# Patient Record
Sex: FEMALE | Race: WHITE | NOT HISPANIC OR LATINO | Employment: OTHER | URBAN - METROPOLITAN AREA
[De-identification: names, ages, dates, MRNs, and addresses within clinical notes are randomized per-mention and may not be internally consistent; named-entity substitution may affect disease eponyms.]

---

## 2019-08-30 ENCOUNTER — APPOINTMENT (OUTPATIENT)
Dept: RADIOLOGY | Facility: CLINIC | Age: 79
End: 2019-08-30
Payer: MEDICARE

## 2019-08-30 ENCOUNTER — OFFICE VISIT (OUTPATIENT)
Dept: OBGYN CLINIC | Facility: CLINIC | Age: 79
End: 2019-08-30
Payer: MEDICARE

## 2019-08-30 VITALS — HEIGHT: 64 IN | BODY MASS INDEX: 32.54 KG/M2 | WEIGHT: 190.6 LBS

## 2019-08-30 DIAGNOSIS — M17.12 PRIMARY OSTEOARTHRITIS OF LEFT KNEE: ICD-10-CM

## 2019-08-30 DIAGNOSIS — G89.29 CHRONIC BILATERAL LOW BACK PAIN, WITH SCIATICA PRESENCE UNSPECIFIED: ICD-10-CM

## 2019-08-30 DIAGNOSIS — M76.32 ILIOTIBIAL BAND SYNDROME, LEFT: ICD-10-CM

## 2019-08-30 DIAGNOSIS — M25.561 PAIN IN BOTH KNEES, UNSPECIFIED CHRONICITY: ICD-10-CM

## 2019-08-30 DIAGNOSIS — M54.5 CHRONIC BILATERAL LOW BACK PAIN, WITH SCIATICA PRESENCE UNSPECIFIED: ICD-10-CM

## 2019-08-30 DIAGNOSIS — M25.562 PAIN IN BOTH KNEES, UNSPECIFIED CHRONICITY: ICD-10-CM

## 2019-08-30 DIAGNOSIS — M17.11 PRIMARY OSTEOARTHRITIS OF RIGHT KNEE: Primary | ICD-10-CM

## 2019-08-30 PROCEDURE — 20610 DRAIN/INJ JOINT/BURSA W/O US: CPT | Performed by: ORTHOPAEDIC SURGERY

## 2019-08-30 PROCEDURE — 99203 OFFICE O/P NEW LOW 30 MIN: CPT | Performed by: ORTHOPAEDIC SURGERY

## 2019-08-30 PROCEDURE — 73562 X-RAY EXAM OF KNEE 3: CPT

## 2019-08-30 RX ORDER — OMEPRAZOLE 40 MG/1
40 CAPSULE, DELAYED RELEASE ORAL EVERY MORNING
COMMUNITY
End: 2021-02-16 | Stop reason: SDUPTHER

## 2019-08-30 RX ORDER — DILTIAZEM HYDROCHLORIDE 180 MG/1
180 CAPSULE, EXTENDED RELEASE ORAL DAILY
Refills: 3 | COMMUNITY
Start: 2019-07-15 | End: 2020-01-08 | Stop reason: SDUPTHER

## 2019-08-30 RX ORDER — ATORVASTATIN CALCIUM 40 MG/1
TABLET, FILM COATED ORAL
COMMUNITY
Start: 2019-08-27 | End: 2020-05-21 | Stop reason: SDUPTHER

## 2019-08-30 RX ORDER — ASPIRIN 81 MG/1
81 TABLET ORAL EVERY MORNING
COMMUNITY

## 2019-08-30 RX ORDER — PEN NEEDLE, DIABETIC 31 GX5/16"
NEEDLE, DISPOSABLE MISCELLANEOUS DAILY
COMMUNITY
Start: 2019-08-27

## 2019-08-30 RX ORDER — VENLAFAXINE HYDROCHLORIDE 75 MG/1
75 CAPSULE, EXTENDED RELEASE ORAL DAILY
Refills: 0 | COMMUNITY
Start: 2019-07-15 | End: 2020-08-26

## 2019-08-30 RX ORDER — CYCLOBENZAPRINE HCL 10 MG
TABLET ORAL
Status: ON HOLD | COMMUNITY
End: 2019-11-15

## 2019-08-30 RX ORDER — MONTELUKAST SODIUM 10 MG/1
10 TABLET ORAL EVERY MORNING
COMMUNITY

## 2019-08-30 RX ORDER — HYALURONATE SODIUM 10 MG/ML
20 SYRINGE (ML) INTRAARTICULAR
Status: COMPLETED | OUTPATIENT
Start: 2019-08-30 | End: 2019-08-30

## 2019-08-30 RX ORDER — GABAPENTIN 300 MG/1
CAPSULE ORAL
Status: ON HOLD | COMMUNITY
End: 2020-03-12 | Stop reason: ALTCHOICE

## 2019-08-30 RX ORDER — MELOXICAM 15 MG/1
TABLET ORAL
COMMUNITY
Start: 2019-08-23 | End: 2019-10-09

## 2019-08-30 RX ADMIN — Medication 20 MG: at 11:30

## 2019-08-30 RX ADMIN — Medication 20 MG: at 11:31

## 2019-08-30 NOTE — PROGRESS NOTES
Assessment/Plan:  1  Primary osteoarthritis of right knee  Ambulatory referral to Physical Therapy    Large joint arthrocentesis: R knee   2  Pain in both knees, unspecified chronicity  XR knee 3 vw left non injury    XR knee 3 vw right non injury   3  Primary osteoarthritis of left knee  Ambulatory referral to Physical Therapy    Large joint arthrocentesis: L knee   4  Iliotibial band syndrome, left  Ambulatory referral to Physical Therapy   5  Chronic bilateral low back pain, with sciatica presence unspecified  Ambulatory referral to Physical Therapy       Scribe Attestation    I,:   Janusz Howe am acting as a scribe while in the presence of the attending physician :        I,:   Terri Lieberman MD personally performed the services described in this documentation    as scribed in my presence :              Keo upon examination and review of the x-rays of her left and right knees does demonstrate bilateral osteoarthritis more severe in the right versus the left  She does also demonstrate symptoms of left iliotibial band syndrome and chronic low back pain  As she is not interested in any surgical intervention and 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 she would be a candidate for viscosupplementation injections bilaterally  She was amenable to this treatment option today and tolerated the injections into her left and right knees well with no complications  I did also encourage her to continue with her weight loss program as I noted this will have positive affects in her overall health as well as decrease the stresses onto her knees , and hopefully alleviate painful symptoms  I would also like her to participate in physical therapy for left and right knees as well as her left iliotibial band and low back pain  I provided her with a prescription for this today  I would like her to follow up with my physician assistant Silvino MAE in 1 weeks time for her 2nd round of Euflexxa injections    I will see her back on as-needed basis  Large joint arthrocentesis: R knee  Date/Time: 8/30/2019 11:30 AM  Consent given by: patient  Supporting Documentation  Indications: pain and joint swelling   Procedure Details  Location: knee - R knee  Needle size: 22 G  Approach: anterolateral  Medications administered: 20 mg Sodium Hyaluronate 20 MG/2ML    Patient tolerance: patient tolerated the procedure well with no immediate complications  Dressing:  Sterile dressing applied    Large joint arthrocentesis: L knee  Date/Time: 8/30/2019 11:31 AM  Consent given by: patient  Site marked: site marked  Timeout: Immediately prior to procedure a time out was called to verify the correct patient, procedure, equipment, support staff and site/side marked as required   Supporting Documentation  Indications: pain   Procedure Details  Location: knee - L knee  Preparation: Patient was prepped and draped in the usual sterile fashion  Needle size: 22 G  Ultrasound guidance: no  Approach: anterolateral  Medications administered: 20 mg Sodium Hyaluronate 20 MG/2ML    Patient tolerance: patient tolerated the procedure well with no immediate complications  Dressing:  Sterile dressing applied          Subjective:   Marcelo Davison is a 67 y o  female who presents to the office today for initial evaluation of her left and right knees  She states that she is experiencing persistent and moderate to severe sharp pain into the anterior aspect of her right knee  This has been ongoing for many years  She also has intermittent mild to moderate discomfort into the left knee  She has been previously seen in [place] and did receive x-rays from a physician down there that did demonstrate osteoarthritis in the right knee  The did suggest total knee arthroplasty however she is not interested in surgical intervention at this time  She has been trying to self treat with her own weight management and has successfully lost 30 pounds    She does stay active however has had to stay off of her feet over the last 4 days due to painful symptoms into her right knee  She does appreciate swelling to the anterior aspect of her knee that has caused restrictions in range of motion particularly into flexion  She denies any bouts of instability however does have painful symptoms that do cause her to be on easy while she is on her feet  Today she denies any distal paresthesias into lower extremities  However will have shoe pain into the lateral aspect of the left leg extending to her knee  Review of Systems   Constitutional: Negative for chills, fever and unexpected weight change  HENT: Negative for hearing loss, nosebleeds and sore throat  Eyes: Positive for pain (Glaucoma)  Negative for redness and visual disturbance  Respiratory: Positive for cough  Negative for shortness of breath and wheezing  Cardiovascular: Positive for palpitations and leg swelling  Negative for chest pain  Gastrointestinal: Negative for abdominal pain, nausea and vomiting  Endocrine: Negative for polydipsia and polyuria  Genitourinary: Negative for dysuria and hematuria  Musculoskeletal: Positive for arthralgias, joint swelling and myalgias  See HPI   Skin: Negative for rash and wound  Neurological: Positive for numbness  Negative for dizziness and headaches  Psychiatric/Behavioral: Negative for decreased concentration and suicidal ideas  The patient is not nervous/anxious  Past Medical History:   Diagnosis Date    Diverticulosis     Glaucoma        Past Surgical History:   Procedure Laterality Date    COLON SURGERY      resection    COLONOSCOPY N/A 7/11/2016    Procedure: COLONOSCOPY;  Surgeon: Patsy Kruger MD;  Location: [place] [address] GI LAB;   Service:     COMBINED REDUCTION MAMMAPLASTY W/ LIPOSUCTION      HERNIA REPAIR      x3       Family History   Problem Relation Age of Onset    Heart disease Mother     Dementia Father     No Known Problems Sister  No Known Problems Brother     No Known Problems Maternal Aunt     No Known Problems Maternal Uncle     No Known Problems Paternal Aunt     No Known Problems Paternal Uncle     No Known Problems Maternal Grandmother     No Known Problems Maternal Grandfather     No Known Problems Paternal Grandmother     No Known Problems Paternal Grandfather        Social History     Occupational History    Not on file   Tobacco Use    Smoking status: Former Smoker     Last attempt to quit: 1976     Years since quittin 1    Smokeless tobacco: Never Used   Substance and Sexual Activity    Alcohol use: No    Drug use: No    Sexual activity: Not on file         Current Outpatient Medications:     aspirin (ASPIRIN LOW DOSE) 81 mg EC tablet, aspirin ec low dose 81 mg tbec, Disp: , Rfl:     atorvastatin (LIPITOR) 40 mg tablet, , Disp: , Rfl:     co-enzyme Q-10 50 MG capsule, Take 50 mg by mouth daily  , Disp: , Rfl:     COMFORT EZ PEN NEEDLES 31G X 6 MM MISC, , Disp: , Rfl:     cyclobenzaprine (FLEXERIL) 10 mg tablet, cyclobenzaprine 10 mg tablet, Disp: , Rfl:     diltiazem (TIAZAC) 180 MG 24 hr capsule, Take 180 mg by mouth daily, Disp: , Rfl: 3    gabapentin (NEURONTIN) 300 mg capsule, gabapentin 300 mg capsule, Disp: , Rfl:     liraglutide (VICTOZA) injection, Inject under the skin daily, Disp: , Rfl:     Melatonin 2 5 MG CAPS, Take 2 5 mg by mouth daily  , Disp: , Rfl:     meloxicam (MOBIC) 15 mg tablet, , Disp: , Rfl:     montelukast (SINGULAIR) 10 mg tablet, montelukast 10 mg tablet, Disp: , Rfl:     omeprazole (PriLOSEC) 40 MG capsule, omeprazole 40 mg cpdr, Disp: , Rfl:     ONE TOUCH ULTRA TEST test strip, USE AS DIRECTED TO test blood sugar TWICE DAILY, Disp: , Rfl: 1    venlafaxine (EFFEXOR-XR) 75 mg 24 hr capsule, Take 75 mg by mouth daily, Disp: , Rfl: 0    No Known Allergies    Objective: There were no vitals filed for this visit      Right Knee Exam     Tenderness   The patient is experiencing tenderness in the medial joint line and patella  Range of Motion   Extension: 0   Flexion: 120     Tests   Geoffrey:  Medial - negative Lateral - negative  Varus: negative Valgus: positive (Slight laxity with good endpoint)  Drawer:  Anterior - negative    Posterior - negative    Other   Erythema: absent  Scars: absent  Sensation: normal  Pulse: present  Effusion: effusion (Plus one) present      Left Knee Exam     Tenderness   The patient is experiencing tenderness in the medial joint line  Range of Motion   Extension: 0   Flexion: 130     Tests   Geoffrey:  Medial - negative Lateral - negative  Varus: negative Valgus: positive (Slight laxity to good endpoint)  Drawer:  Anterior - negative     Posterior - negative    Other   Erythema: absent  Scars: absent  Sensation: normal  Pulse: present  Effusion: no effusion present          Observations   Left Knee   Negative for effusion  Right Knee   Positive for effusion (Plus one)  Physical Exam   Constitutional: She is oriented to person, place, and time  She appears well-developed and well-nourished  HENT:   Head: Normocephalic and atraumatic  Eyes: Conjunctivae are normal  Right eye exhibits no discharge  Left eye exhibits no discharge  Neck: Normal range of motion  Neck supple  Cardiovascular: Normal rate and intact distal pulses  Pulmonary/Chest: Effort normal  No respiratory distress  Musculoskeletal:        Right knee: She exhibits effusion (Plus one)  Left knee: She exhibits no effusion  As noted in HPI   Neurological: She is alert and oriented to person, place, and time  Skin: Skin is warm and dry  Psychiatric: She has a normal mood and affect  Her behavior is normal  Judgment and thought content normal    Vitals reviewed          I have personally reviewed pertinent films in PACS and my interpretation is as follows:  X-rays of the right knee demonstrates tricompartmental osteoarthritis most severe into the medial compartment with subchondral sclerosis, and osteophyte formation medial tibial plateau  X-rays of the left knee demonstrates mild-to-moderate tricompartmental osteoarthritis  No osteophyte formations demonstrated

## 2019-09-06 ENCOUNTER — OFFICE VISIT (OUTPATIENT)
Dept: OBGYN CLINIC | Facility: CLINIC | Age: 79
End: 2019-09-06
Payer: MEDICARE

## 2019-09-06 DIAGNOSIS — M54.16 LUMBAR RADICULOPATHY: Primary | ICD-10-CM

## 2019-09-06 DIAGNOSIS — M25.561 PAIN IN BOTH KNEES, UNSPECIFIED CHRONICITY: Primary | ICD-10-CM

## 2019-09-06 DIAGNOSIS — M17.11 PRIMARY OSTEOARTHRITIS OF RIGHT KNEE: ICD-10-CM

## 2019-09-06 DIAGNOSIS — M17.12 PRIMARY OSTEOARTHRITIS OF LEFT KNEE: ICD-10-CM

## 2019-09-06 DIAGNOSIS — M25.562 PAIN IN BOTH KNEES, UNSPECIFIED CHRONICITY: Primary | ICD-10-CM

## 2019-09-06 PROCEDURE — 20610 DRAIN/INJ JOINT/BURSA W/O US: CPT | Performed by: PHYSICIAN ASSISTANT

## 2019-09-06 RX ORDER — HYALURONATE SODIUM 10 MG/ML
20 SYRINGE (ML) INTRAARTICULAR
Status: COMPLETED | OUTPATIENT
Start: 2019-09-06 | End: 2019-09-06

## 2019-09-06 RX ADMIN — Medication 20 MG: at 13:14

## 2019-09-06 NOTE — PROGRESS NOTES
Assessment/Plan:  1  Pain in both knees, unspecified chronicity     2  Primary osteoarthritis of right knee     3  Primary osteoarthritis of left knee         Follow-up 1 week  Subjective:   Sheba Whitt is a 67 y o  female who presents today for euflexxa bilateral knees #2  Review of Systems      Past Medical History:   Diagnosis Date    Diverticulosis     Glaucoma        Past Surgical History:   Procedure Laterality Date    COLON SURGERY      resection    COLONOSCOPY N/A 2016    Procedure: COLONOSCOPY;  Surgeon: Jerson Masterson MD;  [facility]: [facility]; Service:     COMBINED REDUCTION MAMMAPLASTY W/ LIPOSUCTION      HERNIA REPAIR      x3       Family History   Problem Relation Age of Onset    Heart disease Mother     Dementia Father     No Known Problems Sister     No Known Problems Brother     No Known Problems Maternal Aunt     No Known Problems Maternal Uncle     No Known Problems Paternal Aunt     No Known Problems Paternal Uncle     No Known Problems Maternal Grandmother     No Known Problems Maternal Grandfather     No Known Problems Paternal Grandmother     No Known Problems Paternal Grandfather        Social History     Occupational History    Not on file   Tobacco Use    Smoking status: Former Smoker     Last attempt to quit: 1976     Years since quittin 1    Smokeless tobacco: Never Used   Substance and Sexual Activity    Alcohol use: No    Drug use: No    Sexual activity: Not on file         Current Outpatient Medications:     aspirin (ASPIRIN LOW DOSE) 81 mg EC tablet, aspirin ec low dose 81 mg tbec, Disp: , Rfl:     atorvastatin (LIPITOR) 40 mg tablet, , Disp: , Rfl:     co-enzyme Q-10 50 MG capsule, Take 50 mg by mouth daily  , Disp: , Rfl:     COMFORT EZ PEN NEEDLES 31G X 6 MM MISC, , Disp: , Rfl:     cyclobenzaprine (FLEXERIL) 10 mg tablet, cyclobenzaprine 10 mg tablet, Disp: , Rfl:     diltiazem (TIAZAC) 180 MG 24 hr capsule, Take 180 mg by mouth daily, Disp: , Rfl: 3    gabapentin (NEURONTIN) 300 mg capsule, gabapentin 300 mg capsule, Disp: , Rfl:     liraglutide (VICTOZA) injection, Inject under the skin daily, Disp: , Rfl:     Melatonin 2 5 MG CAPS, Take 2 5 mg by mouth daily  , Disp: , Rfl:     meloxicam (MOBIC) 15 mg tablet, , Disp: , Rfl:     montelukast (SINGULAIR) 10 mg tablet, montelukast 10 mg tablet, Disp: , Rfl:     omeprazole (PriLOSEC) 40 MG capsule, omeprazole 40 mg cpdr, Disp: , Rfl:     ONE TOUCH ULTRA TEST test strip, USE AS DIRECTED TO test blood sugar TWICE DAILY, Disp: , Rfl: 1    venlafaxine (EFFEXOR-XR) 75 mg 24 hr capsule, Take 75 mg by mouth daily, Disp: , Rfl: 0    No Known Allergies    Objective: There were no vitals filed for this visit      Ortho Exam    Physical Exam    Large joint arthrocentesis: L knee  Date/Time: 9/6/2019 1:14 PM  Consent given by: patient  Site marked: site marked  Supporting Documentation  Indications: pain   Procedure Details  Location: knee - L knee  Preparation: Patient was prepped and draped in the usual sterile fashion (Alcohol prep)  Needle size: 22 G  Ultrasound guidance: no  Approach: anterolateral  Medications administered: 20 mg Sodium Hyaluronate 20 MG/2ML    Patient tolerance: patient tolerated the procedure well with no immediate complications  Dressing:  Sterile dressing applied    Large joint arthrocentesis: R knee  Date/Time: 9/6/2019 1:14 PM  Consent given by: patient  Site marked: site marked  Supporting Documentation  Indications: pain   Procedure Details  Location: knee - R knee  Preparation: Patient was prepped and draped in the usual sterile fashion (Alcohol prep)  Needle size: 22 G  Ultrasound guidance: no  Approach: anterolateral  Medications administered: 20 mg Sodium Hyaluronate 20 MG/2ML    Patient tolerance: patient tolerated the procedure well with no immediate complications  Dressing:  Sterile dressing applied

## 2019-09-16 ENCOUNTER — CONSULT (OUTPATIENT)
Dept: PAIN MEDICINE | Facility: CLINIC | Age: 79
End: 2019-09-16
Payer: MEDICARE

## 2019-09-16 VITALS
SYSTOLIC BLOOD PRESSURE: 135 MMHG | WEIGHT: 190 LBS | HEART RATE: 103 BPM | BODY MASS INDEX: 32.44 KG/M2 | DIASTOLIC BLOOD PRESSURE: 89 MMHG | HEIGHT: 64 IN

## 2019-09-16 DIAGNOSIS — G89.4 CHRONIC PAIN SYNDROME: Primary | ICD-10-CM

## 2019-09-16 DIAGNOSIS — M54.42 CHRONIC BILATERAL LOW BACK PAIN WITH BILATERAL SCIATICA: ICD-10-CM

## 2019-09-16 DIAGNOSIS — M48.062 SPINAL STENOSIS OF LUMBAR REGION WITH NEUROGENIC CLAUDICATION: ICD-10-CM

## 2019-09-16 DIAGNOSIS — M51.36 LUMBAR DEGENERATIVE DISC DISEASE: ICD-10-CM

## 2019-09-16 DIAGNOSIS — M54.16 LUMBAR RADICULOPATHY: ICD-10-CM

## 2019-09-16 DIAGNOSIS — M54.41 CHRONIC BILATERAL LOW BACK PAIN WITH BILATERAL SCIATICA: ICD-10-CM

## 2019-09-16 DIAGNOSIS — G89.29 CHRONIC BILATERAL LOW BACK PAIN WITH BILATERAL SCIATICA: ICD-10-CM

## 2019-09-16 PROBLEM — M51.369 LUMBAR DEGENERATIVE DISC DISEASE: Status: ACTIVE | Noted: 2019-09-16

## 2019-09-16 PROBLEM — M54.50 LOW BACK PAIN: Status: ACTIVE | Noted: 2019-09-16

## 2019-09-16 PROBLEM — M51.369 DEGENERATIVE DISC DISEASE, LUMBAR: Status: ACTIVE | Noted: 2019-09-16

## 2019-09-16 PROCEDURE — 99204 OFFICE O/P NEW MOD 45 MIN: CPT | Performed by: ANESTHESIOLOGY

## 2019-09-16 NOTE — PROGRESS NOTES
Assessment:  1  Chronic pain syndrome    2  Chronic bilateral low back pain with bilateral sciatica    3  Spinal stenosis of lumbar region with neurogenic claudication    4  Lumbar radiculopathy    5  Lumbar degenerative disc disease        Plan:  My impressions and treatment recommendations were discussed in detail with the patient, who verbalized understanding and had no further questions  The patient reports a 20 year history of low back pain and bilateral lower extremity radicular symptoms  She states that more recently, she has had left greater than right lower extremity pain  She does state that epidural steroid injections were working on her well while she was in [place]  She would like to continue them at this time  As such, I felt a reasonable to offer the patient a L4-L5 lumbar epidural steroid injection since this could be potentially therapeutic  The procedures, its risks, and benefits were explained in detail to the patient  Risks include but are not limited to bleeding, infection, hematoma formation, abscess formation, weakness, headache, failure the pain to improve, nerve irritation or damage, and potential worsening of the pain  The patient verbalized understanding and wished to proceed with the procedure  I did discuss with the patient that she has diabetes mellitus and she is concerned about the blood sugars that that would be elevated after undergoing a steroid injection  I asked her to speak to her primary care physician about a insulin sliding scale in the event that she does get a increasing his blood sugar as a result of the injections  The patient verbalized understanding  Follow-up is planned in 4 weeks time or sooner as warranted  Discharge instructions were provided  I personally saw and examined the patient and I agree with the above discussed plan of care      History of Present Illness:    Carl Lin is a 67 y o  female who presents to [facility] for initial evaluation of the above stated pain complaints  The patient has a past medical and chronic pain history as outlined in the assessment section  She was referred by Kallie Foster PA-C and Dr Fernanda Delgado  The patient reports a 20 year history of low back pain and bilateral lower extremity radicular symptoms  The patient does report that more recently, she has had left greater than right lower extremity pain  She states that her pain is moderate to severe and 7/10 on the verbal numerical pain rating scale  Her pain is constant in nature and she reports the quality of her pain as cramping, shooting, numbness, sharp, pins and needles, and dull/aching    She reports that her pain is worse in the morning, evening, and night  She reports weakness in her upper and lower extremities  She does not ambulate with any assistive devices  There are no pain worsening or relieving factors  She does report excellent pain relief in the past with epidural steroid injections  She does have a medical marijuana card and states that medical marijuana helps her significantly in terms of her anxiety and muscle relaxation problems  Review of Systems:    Review of Systems   Constitutional: Positive for unexpected weight change  Negative for fever  HENT: Positive for hearing loss  Negative for trouble swallowing  Eyes: Positive for pain  Negative for visual disturbance  Respiratory: Positive for cough  Negative for shortness of breath and wheezing  Cardiovascular: Positive for palpitations  Negative for chest pain  Gastrointestinal: Negative for constipation, diarrhea, nausea and vomiting  Endocrine: Positive for polydipsia and polyuria  Negative for cold intolerance and heat intolerance  Genitourinary: Negative for difficulty urinating and frequency  Musculoskeletal: Positive for arthralgias, joint swelling and myalgias  Negative for gait problem  Skin: Negative for rash  Neurological: Positive for dizziness and numbness  Negative for seizures, syncope, weakness and headaches  Hematological: Does not bruise/bleed easily  Psychiatric/Behavioral: Negative for dysphoric mood  All other systems reviewed and are negative  Patient Active Problem List   Diagnosis    Chronic pain syndrome    Chronic bilateral low back pain with bilateral sciatica    Spinal stenosis of lumbar region with neurogenic claudication    Lumbar radiculopathy    Lumbar degenerative disc disease       Past Medical History:   Diagnosis Date    Diverticulosis     Glaucoma     Lumbar degenerative disc disease 2019       Past Surgical History:   Procedure Laterality Date    COLON SURGERY      resection    COLONOSCOPY N/A 2016    Procedure: COLONOSCOPY;  Surgeon: Jamarcus Fitch MD;  Location: [facility] GI LAB;   Service:     COMBINED REDUCTION MAMMAPLASTY W/ LIPOSUCTION      HERNIA REPAIR      x3       Family History   Problem Relation Age of Onset    Heart disease Mother     Dementia Father     No Known Problems Sister     No Known Problems Brother     No Known Problems Maternal Aunt     No Known Problems Maternal Uncle     No Known Problems Paternal Aunt     No Known Problems Paternal Uncle     No Known Problems Maternal Grandmother     No Known Problems Maternal Grandfather     No Known Problems Paternal Grandmother     No Known Problems Paternal Grandfather        Social History     Occupational History    Not on file   Tobacco Use    Smoking status: Former Smoker     Last attempt to quit: 1976     Years since quittin 2    Smokeless tobacco: Never Used   Substance and Sexual Activity    Alcohol use: No    Drug use: No    Sexual activity: Not on file         Current Outpatient Medications:     aspirin (ASPIRIN LOW DOSE) 81 mg EC tablet, aspirin ec low dose 81 mg tbec, Disp: , Rfl:     atorvastatin (LIPITOR) 40 mg tablet, , Disp: , Rfl:     co-enzyme Q-10 50 MG capsule, Take 50 mg by mouth daily  , Disp: , Rfl:     COMFORT EZ PEN NEEDLES 31G X 6 MM MISC, , Disp: , Rfl:     cyclobenzaprine (FLEXERIL) 10 mg tablet, cyclobenzaprine 10 mg tablet, Disp: , Rfl:     diltiazem (TIAZAC) 180 MG 24 hr capsule, Take 180 mg by mouth daily, Disp: , Rfl: 3    gabapentin (NEURONTIN) 300 mg capsule, gabapentin 300 mg capsule, Disp: , Rfl:     liraglutide (VICTOZA) injection, Inject under the skin daily, Disp: , Rfl:     Melatonin 2 5 MG CAPS, Take 2 5 mg by mouth daily  , Disp: , Rfl:     meloxicam (MOBIC) 15 mg tablet, , Disp: , Rfl:     montelukast (SINGULAIR) 10 mg tablet, montelukast 10 mg tablet, Disp: , Rfl:     omeprazole (PriLOSEC) 40 MG capsule, omeprazole 40 mg cpdr, Disp: , Rfl:     ONE TOUCH ULTRA TEST test strip, USE AS DIRECTED TO test blood sugar TWICE DAILY, Disp: , Rfl: 1    venlafaxine (EFFEXOR-XR) 75 mg 24 hr capsule, Take 75 mg by mouth daily, Disp: , Rfl: 0    No Known Allergies    Physical Exam:    /89   Pulse 103   Ht 5' 4" (1 626 m)   Wt 86 2 kg (190 lb)   BMI 32 61 kg/m²     Constitutional: obese  Eyes: anicteric  HEENT: grossly intact  Neck: supple, symmetric, trachea midline and no masses   Pulmonary:even and unlabored  Cardiovascular:No edema or pitting edema present  Skin:Normal without rashes or lesions and well hydrated  Psychiatric:Mood and affect appropriate  Neurologic:Cranial Nerves II-XII grossly intact  Musculoskeletal:antalgic     Lumbar Spine Exam    Appearance:  Normal lordosis  Palpation/Tenderness:  no tenderness or spasm  Sensory:  no sensory deficits noted  Range of Motion:  Flexion:   Moderately limited  with pain  Extension:  Moderately limited  with pain  Lateral Flexion - Left:  Moderately limited  with pain  Lateral Flexion - Right:  Moderately limited  with pain  Rotation - Left:  Moderately limited  with pain  Rotation - Right:  Moderately limited  with pain   Lumbar facet loading is negative bilaterally  Motor Strength:  Left hip flexion:  5/5  Left hip extension:  5/5  Right hip flexion:  5/5  Right hip extension:  5/5  Left knee flexion:  5/5  Left knee extension:  5/5  Right knee flexion:  5/5  Right knee extension:  5/5  Left foot dorsiflexion:  5/5  Left foot plantar flexion:  5/5  Right foot dorsiflexion:  5/5  Right foot plantar flexion:  5/5  Reflexes:  Left Patellar:  2+   Right Patellar:  2+   Left Achilles:  2+   Right Achilles:  2+   Special Tests:  Left Straight Leg Test:  negative  Right Straight Leg Test:  negative  Left Onel's Maneuver:  negative  Right Onel's Maneuver:  negative

## 2019-09-17 ENCOUNTER — OFFICE VISIT (OUTPATIENT)
Dept: CARDIOLOGY CLINIC | Facility: CLINIC | Age: 79
End: 2019-09-17
Payer: MEDICARE

## 2019-09-17 VITALS
DIASTOLIC BLOOD PRESSURE: 68 MMHG | WEIGHT: 190.3 LBS | OXYGEN SATURATION: 98 % | BODY MASS INDEX: 32.49 KG/M2 | SYSTOLIC BLOOD PRESSURE: 118 MMHG | HEART RATE: 78 BPM | HEIGHT: 64 IN

## 2019-09-17 DIAGNOSIS — I10 BENIGN ESSENTIAL HYPERTENSION: ICD-10-CM

## 2019-09-17 DIAGNOSIS — R00.2 PALPITATIONS: ICD-10-CM

## 2019-09-17 DIAGNOSIS — Z01.810 PREOP CARDIOVASCULAR EXAM: Primary | ICD-10-CM

## 2019-09-17 PROCEDURE — 99215 OFFICE O/P EST HI 40 MIN: CPT | Performed by: INTERNAL MEDICINE

## 2019-09-17 PROCEDURE — 93000 ELECTROCARDIOGRAM COMPLETE: CPT | Performed by: INTERNAL MEDICINE

## 2019-09-17 NOTE — PROGRESS NOTES
Subjective:     Ruth Harris is a 67 y o  female  who presents to the office today for a preoperative consultation at the request of surgeon Dr Williams Salter who plans on performing L4 L5 Lumbar Epidural Steroid Injection  on October 3  Planned anesthesia is local  The patient has no known anesthesia issues  she is able to ambulate 4 blocks on level ground or 2 flights of stairs without stopping   (>4 METs)  She has no pain or shortness of breath with exertion but feels chest pain as she bends over  In January 2016 she had a stress test and echocardiogram which were normal   Studies were done because of chest pain and palpitations   She also had stress test and echocardiogram done in [place] last year which were normal   She is on cardizem for palpitations  The following portions of the patient's history were reviewed and updated as appropriate:   She  has a past medical history of Diverticulosis, Glaucoma, and Lumbar degenerative disc disease (9/16/2019)  She  has a past surgical history that includes Colon surgery; Combined reduction mammaplasty w/ liposuction; Colonoscopy (N/A, 7/11/2016); and Hernia repair  Her family history includes Dementia in her father; Heart disease in her mother; No Known Problems in her brother, maternal aunt, maternal grandfather, maternal grandmother, maternal uncle, paternal aunt, paternal grandfather, paternal grandmother, paternal uncle, and sister  She  reports that she quit smoking about 43 years ago  She has never used smokeless tobacco  She reports that she does not drink alcohol or use drugs    Current Outpatient Medications   Medication Sig Dispense Refill    atorvastatin (LIPITOR) 40 mg tablet       COMFORT EZ PEN NEEDLES 31G X 6 MM MISC       diltiazem (TIAZAC) 180 MG 24 hr capsule Take 180 mg by mouth daily  3    gabapentin (NEURONTIN) 300 mg capsule gabapentin 300 mg capsule      liraglutide (VICTOZA) injection Inject under the skin daily      montelukast (SINGULAIR) 10 mg tablet montelukast 10 mg tablet      omeprazole (PriLOSEC) 40 MG capsule omeprazole 40 mg cpdr      ONE TOUCH ULTRA TEST test strip USE AS DIRECTED TO test blood sugar TWICE DAILY  1    Travoprost (TRAVATAN OP) Apply to eye daily at bedtime      aspirin (ASPIRIN LOW DOSE) 81 mg EC tablet aspirin ec low dose 81 mg tbec      co-enzyme Q-10 50 MG capsule Take 50 mg by mouth daily   cyclobenzaprine (FLEXERIL) 10 mg tablet cyclobenzaprine 10 mg tablet      Melatonin 2 5 MG CAPS Take 2 5 mg by mouth daily   meloxicam (MOBIC) 15 mg tablet       venlafaxine (EFFEXOR-XR) 75 mg 24 hr capsule Take 75 mg by mouth daily  0     No current facility-administered medications for this visit  She has No Known Allergies       Review of Systems  Review of Systems   Constitutional: Positive for fatigue  Negative for chills and fever  HENT: Negative for congestion, nosebleeds and postnasal drip  Respiratory: Positive for shortness of breath  Negative for cough and chest tightness  Cardiovascular: Positive for chest pain  Negative for palpitations and leg swelling  Gastrointestinal: Negative for abdominal distention, abdominal pain, diarrhea, nausea and vomiting  Endocrine: Negative for polydipsia, polyphagia and polyuria  Musculoskeletal: Positive for arthralgias and back pain  Negative for gait problem and myalgias  Skin: Negative for color change, pallor and rash  Allergic/Immunologic: Negative for environmental allergies, food allergies and immunocompromised state  Neurological: Negative for dizziness, seizures, syncope and light-headedness  Hematological: Negative for adenopathy  Does not bruise/bleed easily  Psychiatric/Behavioral: Negative for dysphoric mood  The patient is not nervous/anxious             Objective:      Physical Exam  /68 (BP Location: Left arm, Patient Position: Sitting, Cuff Size: Standard)   Pulse 78   Ht 5' 4" (1 626 m)   Wt 86 3 kg (190 lb 4 8 oz)   SpO2 98%   BMI 32 66 kg/m²    Physical Exam   Constitutional: She appears healthy  No distress  HENT:   Nose: Nose normal    Mouth/Throat: Dentition is normal  Oropharynx is clear  Eyes: Pupils are equal, round, and reactive to light  Conjunctivae are normal    Neck: Normal range of motion  Neck supple  No JVD present  Cardiovascular: Normal rate, regular rhythm and normal heart sounds  Exam reveals no gallop and no friction rub  No murmur heard  Pulmonary/Chest: Effort normal and breath sounds normal  She has no wheezes  She has no rales  Abdominal: Soft  She exhibits no distension  There is no tenderness  Musculoskeletal: She exhibits no edema  Neurological: She is alert and oriented to person, place, and time  Skin: Skin is warm and dry  Cardiographics  ECG: normal sinus rhythm, no blocks or conduction defects, no ischemic changes  Echocardiogram: normal and reviewed by myself    Lab Review   Lab Results   Component Value Date     10/18/2013    K 3 7 04/04/2016    K 3 9 10/18/2013     04/04/2016     10/18/2013    CO2 27 04/04/2016    CO2 32 10/18/2013    BUN 23 06/03/2016    BUN 14 10/18/2013    CREATININE 0 60 06/03/2016    CREATININE 0 7 10/18/2013    CALCIUM 8 7 04/04/2016    CALCIUM 9 3 10/18/2013     Lab Results   Component Value Date    WBC 7 30 04/04/2016    WBC 7 2 10/18/2013    HGB 13 9 04/04/2016    HGB 14 4 10/18/2013    HCT 42 4 04/04/2016    HCT 43 5 10/18/2013    MCV 88 04/04/2016    MCV 89 8 10/18/2013     04/04/2016     10/18/2013     Lab Results   Component Value Date    TRIG 132 04/04/2016    HDL 47 04/04/2016          Assessment:     1  Preop cardiovascular exam    2  Benign essential hypertension    3  Palpitations           59 y o  female  with planned surgery as above      Known risk factors for perioperative complications: Diabetes mellitus        Cardiac Risk Estimation: per the Revised Cardiac Risk Index, the patient has a score of 1 placing her at low-intermediate risk of major cardiac event (6%) and may proceed to OR as planned  No further cardiac workup is indicated at this time  1  Preop cardiovascular exam    2  Benign essential hypertension    3  Palpitations            Plan:      1  Preoperative workup as follows none  2  Change in medication regimen before surgery: none, continue medication regimen including morning of surgery, with sip of water  3  Prophylaxis for cardiac events with perioperative beta-blockers: not indicated  4  Invasive hemodynamic monitoring perioperatively: not indicated  5  Deep vein thrombosis prophylaxis postoperatively:regimen to be chosen by surgical team   6  Other measures: Followup with Dr Rebekah Hayes for diabetes management

## 2019-09-18 ENCOUNTER — OFFICE VISIT (OUTPATIENT)
Dept: OBGYN CLINIC | Facility: CLINIC | Age: 79
End: 2019-09-18
Payer: MEDICARE

## 2019-09-18 VITALS
HEART RATE: 81 BPM | HEIGHT: 64 IN | SYSTOLIC BLOOD PRESSURE: 148 MMHG | DIASTOLIC BLOOD PRESSURE: 84 MMHG | BODY MASS INDEX: 32.71 KG/M2 | WEIGHT: 191.6 LBS

## 2019-09-18 DIAGNOSIS — M17.11 PRIMARY OSTEOARTHRITIS OF RIGHT KNEE: Primary | ICD-10-CM

## 2019-09-18 PROCEDURE — 20610 DRAIN/INJ JOINT/BURSA W/O US: CPT | Performed by: PHYSICIAN ASSISTANT

## 2019-09-18 RX ORDER — HYALURONATE SODIUM 10 MG/ML
20 SYRINGE (ML) INTRAARTICULAR
Status: COMPLETED | OUTPATIENT
Start: 2019-09-18 | End: 2019-09-18

## 2019-09-18 RX ADMIN — Medication 20 MG: at 13:11

## 2019-09-18 RX ADMIN — Medication 20 MG: at 13:12

## 2019-09-18 NOTE — PROGRESS NOTES
Assessment/Plan:  1  Primary osteoarthritis of right knee         Follow-up prn  Subjective:   Gagan Dang is a 67 y o  female who presents today for euflexxa #3 bilateral knees  Review of Systems      Past Medical History:   Diagnosis Date    Diverticulosis     Glaucoma     Lumbar degenerative disc disease 2019       Past Surgical History:   Procedure Laterality Date    COLON SURGERY      resection    COLONOSCOPY N/A 2016    Procedure: COLONOSCOPY;  Surgeon: Patsy Kruger MD;  Location: [facility]; Service:     COMBINED REDUCTION MAMMAPLASTY W/ LIPOSUCTION      HERNIA REPAIR      x3       Family History   Problem Relation Age of Onset    Heart disease Mother     Dementia Father     No Known Problems Sister     No Known Problems Brother     No Known Problems Maternal Aunt     No Known Problems Maternal Uncle     No Known Problems Paternal Aunt     No Known Problems Paternal Uncle     No Known Problems Maternal Grandmother     No Known Problems Maternal Grandfather     No Known Problems Paternal Grandmother     No Known Problems Paternal Grandfather        Social History     Occupational History    Not on file   Tobacco Use    Smoking status: Former Smoker     Last attempt to quit: 1976     Years since quittin 2    Smokeless tobacco: Never Used   Substance and Sexual Activity    Alcohol use: No    Drug use: No    Sexual activity: Not on file         Current Outpatient Medications:     aspirin (ASPIRIN LOW DOSE) 81 mg EC tablet, aspirin ec low dose 81 mg tbec, Disp: , Rfl:     atorvastatin (LIPITOR) 40 mg tablet, , Disp: , Rfl:     co-enzyme Q-10 50 MG capsule, Take 50 mg by mouth daily  , Disp: , Rfl:     COMFORT EZ PEN NEEDLES 31G X 6 MM MISC, , Disp: , Rfl:     cyclobenzaprine (FLEXERIL) 10 mg tablet, cyclobenzaprine 10 mg tablet, Disp: , Rfl:     diltiazem (TIAZAC) 180 MG 24 hr capsule, Take 180 mg by mouth daily, Disp: , Rfl: 3    gabapentin (NEURONTIN) 300 mg capsule, gabapentin 300 mg capsule, Disp: , Rfl:     liraglutide (VICTOZA) injection, Inject under the skin daily, Disp: , Rfl:     Melatonin 2 5 MG CAPS, Take 2 5 mg by mouth daily  , Disp: , Rfl:     meloxicam (MOBIC) 15 mg tablet, , Disp: , Rfl:     montelukast (SINGULAIR) 10 mg tablet, montelukast 10 mg tablet, Disp: , Rfl:     omeprazole (PriLOSEC) 40 MG capsule, omeprazole 40 mg cpdr, Disp: , Rfl:     ONE TOUCH ULTRA TEST test strip, USE AS DIRECTED TO test blood sugar TWICE DAILY, Disp: , Rfl: 1    Travoprost (TRAVATAN OP), Apply to eye daily at bedtime, Disp: , Rfl:     venlafaxine (EFFEXOR-XR) 75 mg 24 hr capsule, Take 75 mg by mouth daily, Disp: , Rfl: 0    No Known Allergies    Objective: There were no vitals filed for this visit      Ortho Exam    Physical Exam    Large joint arthrocentesis: L knee  Date/Time: 9/18/2019 1:11 PM  Consent given by: patient  Site marked: site marked  Supporting Documentation  Indications: pain   Procedure Details  Location: knee - L knee  Preparation: Patient was prepped and draped in the usual sterile fashion (Alcohol prep)  Needle size: 22 G  Ultrasound guidance: no  Approach: anterolateral  Medications administered: 20 mg Sodium Hyaluronate 20 MG/2ML    Patient tolerance: patient tolerated the procedure well with no immediate complications  Dressing:  Sterile dressing applied    Large joint arthrocentesis: R knee  Date/Time: 9/18/2019 1:12 PM  Consent given by: patient  Site marked: site marked  Supporting Documentation  Indications: pain   Procedure Details  Location: knee - R knee  Preparation: Patient was prepped and draped in the usual sterile fashion (Alcohol prep)  Needle size: 22 G  Ultrasound guidance: no  Approach: anterolateral  Medications administered: 20 mg Sodium Hyaluronate 20 MG/2ML    Patient tolerance: patient tolerated the procedure well with no immediate complications  Dressing:  Sterile dressing applied

## 2019-09-25 ENCOUNTER — TELEPHONE (OUTPATIENT)
Dept: PAIN MEDICINE | Facility: CLINIC | Age: 79
End: 2019-09-25

## 2019-09-25 DIAGNOSIS — G89.29 CHRONIC BILATERAL LOW BACK PAIN WITH BILATERAL SCIATICA: Primary | ICD-10-CM

## 2019-09-25 DIAGNOSIS — M54.42 CHRONIC BILATERAL LOW BACK PAIN WITH BILATERAL SCIATICA: Primary | ICD-10-CM

## 2019-09-25 DIAGNOSIS — M54.41 CHRONIC BILATERAL LOW BACK PAIN WITH BILATERAL SCIATICA: Primary | ICD-10-CM

## 2019-09-25 NOTE — TELEPHONE ENCOUNTER
Pts L4 L5 LESI has been rescheduled until 10/31/2019  Pt was instructed new hold date for Advil last dose 10/29/19   Pt very understanding

## 2019-09-25 NOTE — TELEPHONE ENCOUNTER
lvm for pt to call and reschedule procedure please transfer to [facility] at  [phone]

## 2019-10-04 NOTE — TELEPHONE ENCOUNTER
Patient is calling in requesting pain medication until she is able to get her procedure done  She is in pain, her pain level is a 8/10  She can hardly walk and she can't make it to 10/31 with out something   Please send script to [phone] : [id number]

## 2019-10-07 NOTE — TELEPHONE ENCOUNTER
Please call patient to see if she would like to schedule an office visit with Dr Mark Foster on Friday Oct 11th or Oct 18   TY

## 2019-10-07 NOTE — TELEPHONE ENCOUNTER
[place] :  Dr Ricardo Bob patient    SW patient, states that she is having a lot of pain in her back and legs  Her pain is 8/10  States she can hardly walk and would like to know if she could have a prescription for Diclofenac 75 mg EC  Patient states she has been off the prescription for about 2 months now and her pain has started back up  Patient was prescribed diclofenac when she lived in [place] and she said it really helped with her pain and inflammation  Patient is currently taking ASA and is diabetic  Patient is not scheduled for her procedure until 10/31/19 due to AS being out of the office  Please advise   TY

## 2019-10-07 NOTE — TELEPHONE ENCOUNTER
Need to have patient come in for evaluation prior to starting medication  Please notify her I will be covering Dr Nicholson Neither the following two Fridays October 11th and October 18th    If so, please schedule for office visit

## 2019-10-08 NOTE — TELEPHONE ENCOUNTER
Pt upset she does not want another DR  All she wants is an anti  flamitory to relieve her pain  Pt wants to know what the problem is  Pt wants a call back asap      Pt can be reached at [phone]

## 2019-10-08 NOTE — TELEPHONE ENCOUNTER
Per note lmom for pt to cb to see if she would like to schedule with Dr Thao Hurst on 10/11 or 10/18

## 2019-10-09 DIAGNOSIS — G89.29 CHRONIC BILATERAL LOW BACK PAIN WITH BILATERAL SCIATICA: ICD-10-CM

## 2019-10-09 DIAGNOSIS — M54.41 CHRONIC BILATERAL LOW BACK PAIN WITH BILATERAL SCIATICA: ICD-10-CM

## 2019-10-09 DIAGNOSIS — M54.42 CHRONIC BILATERAL LOW BACK PAIN WITH BILATERAL SCIATICA: ICD-10-CM

## 2019-10-09 RX ORDER — DICLOFENAC SODIUM 25 MG/1
TABLET, DELAYED RELEASE ORAL
Qty: 60 TABLET | Refills: 0 | Status: CANCELLED | OUTPATIENT
Start: 2019-10-09

## 2019-10-09 RX ORDER — DICLOFENAC SODIUM 25 MG/1
TABLET, DELAYED RELEASE ORAL
Qty: 90 TABLET | Refills: 0 | Status: SHIPPED | OUTPATIENT
Start: 2019-10-09 | End: 2019-10-09 | Stop reason: SDUPTHER

## 2019-10-09 RX ORDER — DICLOFENAC SODIUM 25 MG/1
TABLET, DELAYED RELEASE ORAL
Qty: 60 TABLET | Refills: 0 | Status: SHIPPED | OUTPATIENT
Start: 2019-10-09 | End: 2019-10-10 | Stop reason: SDUPTHER

## 2019-10-09 NOTE — TELEPHONE ENCOUNTER
Contacted [facility] pharmacy and left a detailed mom in regards to changing the script to BID #60 pills

## 2019-10-09 NOTE — TELEPHONE ENCOUNTER
I tried to change the schedule to BID in the system but it wouldn't allow me, stating not authorized  Dorota frederick

## 2019-10-09 NOTE — TELEPHONE ENCOUNTER
[facility] called stating the insurance will only pay for diclofenac (VOLTAREN) 25 MG EC tablet at 2 times a day instead of 3 as prescribed    Please advise      Griselda Pointer can be reached at [phone]

## 2019-10-09 NOTE — TELEPHONE ENCOUNTER
Can you please call the patient and advise her that I sent a script for Diclofenac 25 mg TID PRN for pain  Please advise her that this is the maximum dose I would feel comfortable prescribing her because of her age, cardiac history, and the fact that she is on prilosec for her GI tract  She is not to take meloxicam or any other NSAID while on the Diclofenac except for Tylenol or Acetaminophen  She should take it with food and call our office if she experiences GI upset or issues with the Diclofenac  Thank you

## 2019-10-09 NOTE — TELEPHONE ENCOUNTER
S/w the patient and reviewed the previous task  Patient verbalized understanding and appreciated the call

## 2019-10-10 NOTE — TELEPHONE ENCOUNTER
Ning Walsh from [facility] stated that medication Diclofenac 25 mg twice a day is not optional to be filled through patients insurance   She said that the dosage required for coverage is 50 mg or 75 mg optional  Please advise, toan    Call back# [phone]

## 2019-10-10 NOTE — TELEPHONE ENCOUNTER
Lm for pt to cb to advise that script was changed to twice a day due to insurance  Unable to locate BERTO to leave detailed message

## 2019-10-10 NOTE — TELEPHONE ENCOUNTER
Attempted to reach the patient  LVMOM , provided CB#, [place] provided for further questions and concerns

## 2019-10-10 NOTE — TELEPHONE ENCOUNTER
PT returned call and stated they are delivering the medication today and she is aware  Of the precautions prior to the procedure

## 2019-10-10 NOTE — TELEPHONE ENCOUNTER
[place] patient:  Dr Jose Alejandro Dodge, this was sent to the pharmacy yesterday by DANIELLE  But, the insurance will not cover diclofenac 25 mg twice a day  It has to be 50 mg or 75 mg  Please advise  TY       [facility] pharmacy

## 2019-10-30 NOTE — TELEPHONE ENCOUNTER
Pt called today in regards to procedure for 10/31/19  Wanted to know when she should hold her aspirin  I told the patient she did not mention taking aspirin at time of scheduling she had only mentioned Advil  She replied same thing  I explained they are not the same thing and each medication has a different hold  Pt then stated she takes both on occasion  Pt also advised she is now taking Diclofenac in addition  Pt has been rescheduled again for not holding medication  Pt has been rescheduled until 11/15/19  Pt has been instructed last dose of Aspirin 11/8/19  Last dose of Advil 11/13/19  Last dose of Diclofenac 11/10/19  I made sure patient wrote it all down and had her repeat it back to me

## 2019-11-05 PROBLEM — M51.369 DEGENERATION OF LUMBAR INTERVERTEBRAL DISC: Status: ACTIVE | Noted: 2019-11-05

## 2019-11-05 PROBLEM — M48.062 PSEUDOCLAUDICATION SYNDROME: Status: ACTIVE | Noted: 2019-11-05

## 2019-11-05 PROBLEM — M51.36 DEGENERATION OF LUMBAR INTERVERTEBRAL DISC: Status: ACTIVE | Noted: 2019-11-05

## 2019-11-15 ENCOUNTER — HOSPITAL ENCOUNTER (OUTPATIENT)
Facility: AMBULARY SURGERY CENTER | Age: 79
Setting detail: OUTPATIENT SURGERY
Discharge: HOME | End: 2019-11-15
Attending: ANESTHESIOLOGY | Admitting: ANESTHESIOLOGY
Payer: MEDICARE

## 2019-11-15 ENCOUNTER — APPOINTMENT (OUTPATIENT)
Dept: RADIOLOGY | Facility: HOSPITAL | Age: 79
End: 2019-11-15
Payer: MEDICARE

## 2019-11-15 VITALS
RESPIRATION RATE: 18 BRPM | DIASTOLIC BLOOD PRESSURE: 86 MMHG | TEMPERATURE: 98.1 F | HEART RATE: 78 BPM | OXYGEN SATURATION: 97 % | SYSTOLIC BLOOD PRESSURE: 193 MMHG

## 2019-11-15 LAB — GLUCOSE SERPL-MCNC: 128 MG/DL (ref 65–140)

## 2019-11-15 PROCEDURE — 62323 NJX INTERLAMINAR LMBR/SAC: CPT | Performed by: ANESTHESIOLOGY

## 2019-11-15 PROCEDURE — 72020 X-RAY EXAM OF SPINE 1 VIEW: CPT

## 2019-11-15 PROCEDURE — 82948 REAGENT STRIP/BLOOD GLUCOSE: CPT

## 2019-11-15 RX ORDER — LIDOCAINE WITH 8.4% SOD BICARB 0.9%(10ML)
SYRINGE (ML) INJECTION AS NEEDED
Status: DISCONTINUED | OUTPATIENT
Start: 2019-11-15 | End: 2019-11-15 | Stop reason: HOSPADM

## 2019-11-15 RX ORDER — METHYLPREDNISOLONE ACETATE 80 MG/ML
INJECTION, SUSPENSION INTRA-ARTICULAR; INTRALESIONAL; INTRAMUSCULAR; SOFT TISSUE AS NEEDED
Status: DISCONTINUED | OUTPATIENT
Start: 2019-11-15 | End: 2019-11-15 | Stop reason: HOSPADM

## 2019-11-15 RX ORDER — MAGNESIUM HYDROXIDE 1200 MG/15ML
LIQUID ORAL AS NEEDED
Status: DISCONTINUED | OUTPATIENT
Start: 2019-11-15 | End: 2019-11-15 | Stop reason: HOSPADM

## 2019-11-15 NOTE — OP NOTE
ATTENDING PHYSICIAN:  Apoorva Vela MD     PROCEDURE:  Lumbar interlaminar left parasagittal epidural steroid injection with steroid and local anesthetic under fluoroscopy at the L4-L5 level  PREPROCEDURE DIAGNOSIS:  Low back pain and lower extremity radicular symptoms  POSTPROCEDURE DIAGNOSIS:  Low back pain and lower extremity radicular symptoms  ANESTHESIA:  Local     ESTIMATED BLOOD LOSS:  Minimal     COMPLICATIONS:  None  LOCATION:  [address]  CONSENT:  Today's procedure, its potential benefits as well as its risks and potential side effects were reviewed  Discussed risks of the procedure including bleeding, infection, nerve irritation or damage, reactions to the medications, headache, failure of the pain to improve, and potential worsening of the pain were explained to the patient who verbalized understanding and who wished to proceed  Written informed consent was thereby obtained  DESCRIPTION OF THE PROCEDURE:  After written informed consent was obtained, the patient was taken to the fluoroscopy suite and placed in the prone position  Anatomical landmarks were identified by way of fluoroscopy in multiple views  The skin of the lumbar region was prepped and draped in the usual sterile fashion  Strict aseptic technique was utilized  The skin and subcutaneous tissues at the needle entry site were infiltrated with 3 mL of 1% preservative-free lidocaine using a 25-gauge 1-1/2-inch needle  A 20-gauge Tuohy needle was then incrementally advanced under fluoroscopy using a loss of resistance technique  Upon entering into the epidural space, a positive loss of resistance to air was noted and a characteristic "pop" was felt   Proper placement into the epidural space was confirmed with fluoroscopy in multiple views and by continued loss of resistance after injection of 1 mL of sterile preservative-free normal saline as well as the administration of contrast to delineate the epidural space  There were no paresthesias reported  After negative aspiration for CSF or heme, a 6 mL injectate consisting of 1 mL of Depo-Medrol 80 mg/mL and 1 mL of Depo-Medrol 40 mg/mL mixed with 4 mL of preservative-free normal saline was slowly injected  The patient tolerated the procedure well and all needles were removed with the tips intact  Hemostasis was maintained  There were no apparent paresthesias or complications  The skin was wiped clean and a Band-Aid was placed as appropriate  The patient was monitored for an appropriate period of time following the procedure and remained hemodynamically stable and neurovascularly intact following the procedure  The patient was ultimately discharged to home with supervision in good condition and instructed to call the office in a few days for an update or sooner as warranted  I was present and participated in all key and critical portions of this procedure      Josy Conley MD  11/15/2019  8:48 AM

## 2019-11-15 NOTE — DISCHARGE INSTRUCTIONS
Epidural Steroid Injection   WHAT YOU NEED TO KNOW:   An epidural steroid injection (CELESTINA) is a procedure to inject steroid medicine into the epidural space  The epidural space is between your spinal cord and vertebrae  Steroids reduce inflammation and fluid buildup in your spine that may be causing pain  You may be given pain medicine along with the steroids  ACTIVITY  · Do not drive or operate machinery today  · No strenuous activity today - bending, lifting, etc   · You may resume normal activites starting tomorrow - start slowly and as tolerated  · You may shower today, but no tub baths or hot tubs  · You may have numbness for several hours from the local anesthetic  Please use caution and common sense, especially with weight-bearing activities  CARE OF THE INJECTION SITE  · If you have soreness or pain, apply ice to the area today (20 minutes on/20 minutes off)  · Starting tomorrow, you may use warm, moist heat or ice if needed  · You may have an increase or change in your discomfort for 36-48 hours after your treatment  · Apply ice and continue with any pain medication you have been prescribed  · Notify [facility] if you have any of the following: redness, drainage, swelling, headache, stiff neck or fever above 100°F     SPECIAL INSTRUCTIONS  · Our office will contact you in approximately 7 days for a progress report  MEDICATIONS  · Continue to take all routine medications  · Our office may have instructed you to hold some medications  If you have a problem specifically related to your procedure, please call our office at [phone]  Problems not related to your procedure should be directed to your primary care physician

## 2019-11-15 NOTE — H&P
History of Present Illness: The patient is a 67 y o  female who presents with complaints of low back pain  Patient Active Problem List   Diagnosis    Chronic pain syndrome    Chronic bilateral low back pain with bilateral sciatica    Spinal stenosis of lumbar region with neurogenic claudication    Lumbar radiculopathy    Lumbar degenerative disc disease    Degenerative disc disease, lumbar    Neurogenic claudication due to lumbar spinal stenosis    Low back pain    Degeneration of lumbar intervertebral disc    Pseudoclaudication syndrome       Past Medical History:   Diagnosis Date    Diverticulosis     Glaucoma     Lumbar degenerative disc disease 9/16/2019       Past Surgical History:   Procedure Laterality Date    COLON SURGERY      resection    COLONOSCOPY N/A 7/11/2016    Procedure: COLONOSCOPY;  Surgeon: Dianne Bhakta MD;  Location: [facility] GI [facility]; Service:     COMBINED REDUCTION MAMMAPLASTY W/ LIPOSUCTION      HERNIA REPAIR      x3       No current facility-administered medications for this encounter  No Known Allergies    Physical Exam:   Vitals:    11/15/19 0730   BP: 143/76   Pulse: 83   Resp: 18   Temp: 98 1 °F (36 7 °C)   SpO2: 96%     General: Awake, Alert, Oriented x 3, Mood and affect appropriate  Respiratory: Respirations even and unlabored  Cardiovascular: Peripheral pulses intact; no edema  Musculoskeletal Exam:  Tenderness in lumbar spine region    ASA Score:  3    Patient/Chart Verification  Patient ID Verified: Verbal, Armband  ID Band Applied: Yes  Consents Confirmed: Procedural  H&P( within 30 days) Verified: Yes  Interval H&P(within 24 hr) Complete (required for Outpatients and Surgery Admit only): Yes  Beta Blocker given : N/A  Pre-op Lab/Test Results Available: In chart  Pregnancy Lab Collected: N/A comment  Does Patient Have a Prosthetic Device/Implant: No    Assessment:  Low back pain      Plan:  Proceed with L4-L5 lumbar epidural steroid injection

## 2019-11-22 ENCOUNTER — TELEPHONE (OUTPATIENT)
Dept: PAIN MEDICINE | Facility: CLINIC | Age: 79
End: 2019-11-22

## 2019-11-22 DIAGNOSIS — G89.29 CHRONIC BILATERAL LOW BACK PAIN WITH BILATERAL SCIATICA: ICD-10-CM

## 2019-11-22 DIAGNOSIS — M48.062 SPINAL STENOSIS OF LUMBAR REGION WITH NEUROGENIC CLAUDICATION: ICD-10-CM

## 2019-11-22 DIAGNOSIS — M54.42 CHRONIC BILATERAL LOW BACK PAIN WITH BILATERAL SCIATICA: ICD-10-CM

## 2019-11-22 DIAGNOSIS — G89.4 CHRONIC PAIN SYNDROME: Primary | ICD-10-CM

## 2019-11-22 DIAGNOSIS — M51.36 LUMBAR DEGENERATIVE DISC DISEASE: ICD-10-CM

## 2019-11-22 DIAGNOSIS — M54.16 LUMBAR RADICULOPATHY: ICD-10-CM

## 2019-11-22 DIAGNOSIS — M54.41 CHRONIC BILATERAL LOW BACK PAIN WITH BILATERAL SCIATICA: ICD-10-CM

## 2019-11-26 ENCOUNTER — HOSPITAL ENCOUNTER (EMERGENCY)
Facility: HOSPITAL | Age: 79
Discharge: HOME | End: 2019-11-26
Attending: EMERGENCY MEDICINE
Payer: MEDICARE

## 2019-11-26 VITALS
HEIGHT: 64 IN | OXYGEN SATURATION: 94 % | HEART RATE: 86 BPM | SYSTOLIC BLOOD PRESSURE: 141 MMHG | BODY MASS INDEX: 32.89 KG/M2 | TEMPERATURE: 98.7 F | DIASTOLIC BLOOD PRESSURE: 80 MMHG | RESPIRATION RATE: 18 BRPM

## 2019-11-26 DIAGNOSIS — S02.2XXA NASAL FRACTURE: ICD-10-CM

## 2019-11-26 DIAGNOSIS — S00.531A CONTUSION OF LIP: ICD-10-CM
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
## 2019-11-26 PROCEDURE — 99284 EMERGENCY DEPT VISIT MOD MDM: CPT

## 2019-11-26 NOTE — ED PROVIDER NOTES
History  Chief Complaint   Patient presents with    Fall     Patient states that she was walking outside and tripped over an uneven curb  Patient states that she broke her fall with her hands and her knees  Patient is a 76-year-old female presents with complaint of a manual trip and fall landing on her hands and knees and hitting her face  Patient denies any loss of consciousness, she was not dizzy or lightheaded prior to falling  Patient denies any visual changes, she has no neck or back pain  Patient states she is tender on her nose but it did not bleed after the fall  Patient denies any neck or back pain  Patient states her hands are sore but they have full range of motion  Patient denies any numbness or tingling or weakness to any of her extremities  She is not complaining of any chest pain, no shortness of breath, no abdominal pain, no nausea vomiting diarrhea  At triage the patient is requesting to talk to someone from  because of personal ongoing issues at home  Prior to Admission Medications   Prescriptions Last Dose Informant Patient Reported? Taking? COMFORT EZ PEN NEEDLES 31G X 6 MM MISC  Self Yes No   ONE TOUCH ULTRA TEST test strip  Self Yes No   Sig: USE AS DIRECTED TO test blood sugar TWICE DAILY   Travoprost (TRAVATAN OP)  Self Yes No   Sig: Apply to eye daily at bedtime   aspirin (ASPIRIN LOW DOSE) 81 mg EC tablet  Self Yes No   Sig: aspirin ec low dose 81 mg tbec   atorvastatin (LIPITOR) 40 mg tablet  Self Yes No   co-enzyme Q-10 50 MG capsule  Self Yes No   Sig: Take 50 mg by mouth daily  diclofenac (VOLTAREN) 50 mg EC tablet   No No   Sig: Take 1 PO BID PRN FOR Pain  Take with FOOD  NO OTHER NSAIDS     diltiazem (TIAZAC) 180 MG 24 hr capsule  Self Yes No   Sig: Take 180 mg by mouth daily   gabapentin (NEURONTIN) 300 mg capsule  Self Yes No   Sig: gabapentin 300 mg capsule   liraglutide (VICTOZA) injection  Self Yes No   Sig: Inject under the skin daily montelukast (SINGULAIR) 10 mg tablet  Self Yes No   Sig: montelukast 10 mg tablet   omeprazole (PriLOSEC) 40 MG capsule  Self Yes No   Sig: omeprazole 40 mg cpdr   venlafaxine (EFFEXOR-XR) 75 mg 24 hr capsule  Self Yes No   Sig: Take 75 mg by mouth daily      Facility-Administered Medications: None       Past Medical History:   Diagnosis Date    Diverticulosis     Glaucoma     Lumbar degenerative disc disease 2019       Past Surgical History:   Procedure Laterality Date    COLON SURGERY      resection    COLONOSCOPY N/A 2016    Procedure: COLONOSCOPY;  Surgeon: Queen Dianne MD;  Location: [facility]; Service:     COMBINED REDUCTION MAMMAPLASTY W/ LIPOSUCTION      EPIDURAL BLOCK INJECTION N/A 11/15/2019    Procedure: L4 L5 Lumbar Epidural Steroid Injection (43471); Surgeon: Tiny Romberg, MD;  Location: [facility] MAIN OR;  Service: Pain Management     HERNIA REPAIR      x3       Family History   Problem Relation Age of Onset    Heart disease Mother     Dementia Father     No Known Problems Sister     No Known Problems Brother     No Known Problems Maternal Aunt     No Known Problems Maternal Uncle     No Known Problems Paternal Aunt     No Known Problems Paternal Uncle     No Known Problems Maternal Grandmother     No Known Problems Maternal Grandfather     No Known Problems Paternal Grandmother     No Known Problems Paternal Grandfather      I have reviewed and agree with the history as documented  Social History     Tobacco Use    Smoking status: Former Smoker     Last attempt to quit: 1976     Years since quittin 4    Smokeless tobacco: Never Used   Substance Use Topics    Alcohol use: No    Drug use: No        Review of Systems   Constitutional: Negative for chills and fever  HENT: Negative for dental problem, facial swelling and trouble swallowing  Respiratory: Negative for chest tightness and shortness of breath      Cardiovascular: Negative for chest pain and leg swelling  Gastrointestinal: Negative for abdominal pain, nausea and vomiting  Genitourinary: Negative for dysuria and flank pain  Musculoskeletal: Negative for back pain, gait problem and neck pain  Skin: Positive for wound  Neurological: Negative for weakness and numbness  Hematological: Negative  Psychiatric/Behavioral: Negative  Physical Exam  Physical Exam   Constitutional: She is oriented to person, place, and time  She appears well-developed and well-nourished  HENT:   Head: Normocephalic and atraumatic  Eyes: Pupils are equal, round, and reactive to light  EOM are normal    Neck: Normal range of motion  Cardiovascular: Normal rate and regular rhythm  Pulmonary/Chest: Effort normal and breath sounds normal    Abdominal: Soft  Bowel sounds are normal    Musculoskeletal: Normal range of motion  Neurological: She is alert and oriented to person, place, and time  Skin: Skin is warm and dry  Capillary refill takes less than 2 seconds  Psychiatric: She has a normal mood and affect  Nursing note and vitals reviewed  Vital Signs  ED Triage Vitals [11/26/19 1126]   Temperature Pulse Respirations Blood Pressure SpO2   98 7 °F (37 1 °C) 86 18 141/80 94 %      Temp src Heart Rate Source Patient Position - Orthostatic VS BP Location FiO2 (%)   -- -- -- -- --      Pain Score       7           Vitals:    11/26/19 1126   BP: 141/80   Pulse: 86         Visual Acuity      ED Medications  Medications - No data to display    Diagnostic Studies  Results Reviewed     None                 No orders to display              Procedures  Procedures       ED Course                               MDM  Number of Diagnoses or Management Options  Contusion of lip:   Fall:   Nasal fracture:   Diagnosis management comments: Patient clinically might have a nasal bone fracture    The patient spoke to  while she was in the emergency room and got some information for her personal issues  I had a long talk with the patient about nasal bone fractures and I gave her the name of a plastic surgeon for follow-up in case she wants to get her deviated septum taking care of  I answered all questions the best my ability, patient states understanding is in agreement with the assessment plan  Disposition  Final diagnoses:   Fall   Contusion of lip   Nasal fracture     Time reflects when diagnosis was documented in both MDM as applicable and the Disposition within this note     Time User Action Codes Description Comment    11/26/2019  1:18 PM Nuris Hyatt Add [M17  XXXA] Fall     11/26/2019  1:18 PM Nuris Hyatt Add [Z08 377C] Contusion of lip     11/26/2019  1:18 PM Nuris Hyatt Add [S02  2XXA] Nasal fracture       ED Disposition     ED Disposition Condition Date/Time Comment    Discharge Stable Tue Nov 26, 2019  1:18 PM Monda Schilder discharge to home/self care  Follow-up Information     Follow up With Specialties Details Why Contact Info    Rozelle Burkitt, MD Plastic Surgery Schedule an appointment as soon as possible for a visit  [facility]   [address]  [address] [place] [address]  [phone]            Discharge Medication List as of 11/26/2019  1:19 PM      CONTINUE these medications which have NOT CHANGED    Details   aspirin (ASPIRIN LOW DOSE) 81 mg EC tablet aspirin ec low dose 81 mg tbec, Historical Med      atorvastatin (LIPITOR) 40 mg tablet Starting Tue 8/27/2019, Historical Med      co-enzyme Q-10 50 MG capsule Take 50 mg by mouth daily  , Historical Med      COMFORT EZ PEN NEEDLES 31G X 6 MM MISC Starting Tue 8/27/2019, Historical Med      diclofenac (VOLTAREN) 50 mg EC tablet Take 1 PO BID PRN FOR Pain  Take with FOOD   NO OTHER NSAIDS , Normal      diltiazem (TIAZAC) 180 MG 24 hr capsule Take 180 mg by mouth daily, Starting Mon 7/15/2019, Historical Med      gabapentin (NEURONTIN) 300 mg capsule gabapentin 300 mg capsule, Historical Med liraglutide (VICTOZA) injection Inject under the skin daily, Historical Med      montelukast (SINGULAIR) 10 mg tablet montelukast 10 mg tablet, Historical Med      omeprazole (PriLOSEC) 40 MG capsule omeprazole 40 mg cpdr, Historical Med      ONE TOUCH ULTRA TEST test strip USE AS DIRECTED TO test blood sugar TWICE DAILY, Historical Med      Travoprost (TRAVATAN OP) Apply to eye daily at bedtime, Historical Med      venlafaxine (EFFEXOR-XR) 75 mg 24 hr capsule Take 75 mg by mouth daily, Starting Mon 7/15/2019, Historical Med           No discharge procedures on file      ED Provider  Electronically Signed by           Gloria Varner MD  11/28/19 0689
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
## 2019-11-26 NOTE — TELEPHONE ENCOUNTER
Patient states she or he has 90% of improvement & 4-5 pain level (on/off)  Patient states she would like to do physical therapy   Please advise, toan    Call back# [phone]

## 2019-11-27 NOTE — TELEPHONE ENCOUNTER
S/W pt and advised of PT order  Number given to [facility]     FYI: Pt would like AS to know that she tripped on the sidewalk yesterday and fell on her knees and face  Knees are tender, nose and cheeks are black and blue  Pt denied any fractures, but while reviewing ER note, it does say she has a nasal fracture  Advised pt of the same and encouraged to f/u with plastic surgeon as instructed  Also advised pt to f/u with PCP  Pt states this injury may delay her getting in to PT  Will keep f/u appt with AS on 12/16/19  Advised pt to call earlier with any issues  Pt agreeable
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
## 2019-12-16 ENCOUNTER — APPOINTMENT (OUTPATIENT)
Dept: RADIOLOGY | Facility: CLINIC | Age: 79
End: 2019-12-16
Payer: MEDICARE

## 2019-12-16 ENCOUNTER — OFFICE VISIT (OUTPATIENT)
Dept: PAIN MEDICINE | Facility: CLINIC | Age: 79
End: 2019-12-16
Payer: MEDICARE

## 2019-12-16 VITALS
BODY MASS INDEX: 32.98 KG/M2 | HEART RATE: 79 BPM | WEIGHT: 193.2 LBS | HEIGHT: 64 IN | SYSTOLIC BLOOD PRESSURE: 128 MMHG | DIASTOLIC BLOOD PRESSURE: 65 MMHG

## 2019-12-16 DIAGNOSIS — M25.531 RIGHT WRIST PAIN: ICD-10-CM

## 2019-12-16 DIAGNOSIS — G89.4 CHRONIC PAIN SYNDROME: Primary | ICD-10-CM

## 2019-12-16 DIAGNOSIS — G89.29 CHRONIC BILATERAL LOW BACK PAIN WITH BILATERAL SCIATICA: ICD-10-CM

## 2019-12-16 DIAGNOSIS — M54.16 LUMBAR RADICULOPATHY: ICD-10-CM

## 2019-12-16 DIAGNOSIS — M54.12 CERVICAL RADICULOPATHY: ICD-10-CM

## 2019-12-16 DIAGNOSIS — M54.42 CHRONIC BILATERAL LOW BACK PAIN WITH BILATERAL SCIATICA: ICD-10-CM

## 2019-12-16 DIAGNOSIS — M51.36 LUMBAR DEGENERATIVE DISC DISEASE: ICD-10-CM

## 2019-12-16 DIAGNOSIS — M54.41 CHRONIC BILATERAL LOW BACK PAIN WITH BILATERAL SCIATICA: ICD-10-CM

## 2019-12-16 DIAGNOSIS — M54.2 NECK PAIN: ICD-10-CM

## 2019-12-16 DIAGNOSIS — M48.062 SPINAL STENOSIS OF LUMBAR REGION WITH NEUROGENIC CLAUDICATION: ICD-10-CM

## 2019-12-16 PROBLEM — M51.369 DEGENERATION OF LUMBAR INTERVERTEBRAL DISC: Status: RESOLVED | Noted: 2019-11-05 | Resolved: 2019-12-16

## 2019-12-16 PROBLEM — M51.369 DEGENERATIVE DISC DISEASE, LUMBAR: Status: RESOLVED | Noted: 2019-09-16 | Resolved: 2019-12-16

## 2019-12-16 PROCEDURE — 73110 X-RAY EXAM OF WRIST: CPT

## 2019-12-16 PROCEDURE — 99214 OFFICE O/P EST MOD 30 MIN: CPT | Performed by: ANESTHESIOLOGY

## 2019-12-16 RX ORDER — PAROXETINE 10 MG/1
10 TABLET, FILM COATED ORAL EVERY MORNING
COMMUNITY
Start: 2019-10-23

## 2019-12-16 NOTE — PROGRESS NOTES
Pain Medicine Follow-Up Note    Assessment:  1  Chronic pain syndrome    2  Chronic bilateral low back pain with bilateral sciatica    3  Lumbar radiculopathy    4  Lumbar degenerative disc disease    5  Spinal stenosis of lumbar region with neurogenic claudication    6  Neck pain    7  Cervical radiculopathy    8  Right wrist pain        Plan:  Orders Placed This Encounter   Procedures    XR wrist 3+ vw right     Standing Status:   Future     Standing Expiration Date:   12/16/2023     Scheduling Instructions:      Bring along any outside films relating to this procedure   Ambulatory referral to Physical Therapy     Standing Status:   Future     Standing Expiration Date:   12/16/2020     Referral Priority:   Routine     Referral Type:   Physical Therapy     Referral Reason:   Specialty Services Required     Requested Specialty:   Physical Therapy     Number of Visits Requested:   1     Expiration Date:   12/16/2020    Ambulatory referral to Hand Surgery     Standing Status:   Future     Standing Expiration Date:   12/16/2020     Referral Priority:   Routine     Referral Type:   Consult - AMB     Referral Reason:   Specialty Services Required     Referred to Provider:   Goldy Isaacs DO     Requested Specialty:   Hand Surgery     Number of Visits Requested:   1     Expiration Date:   12/16/2020       New Medications Ordered This Visit   Medications    PARoxetine (PAXIL) 10 mg tablet    diclofenac sodium (VOLTAREN) 1 %     My impressions and treatment recommendations were discussed in detail with the patient who verbalized understanding and had no further questions  The patient is reporting excellent pain relief since undergoing the L4-L5 lumbar epidural steroid injection on November 15, 2019  She states that she is relatively pain free in her low back and is very satisfied with the results      Unfortunately, the patient does state that she had a slip and fall accident where she landed on her bilateral hands  She is reporting significant pain involving her right wrist   She states that she has been recovering very slowly, but she has not yet had an x-ray of her right wrist   I felt it reasonable to have the patient undergo a right wrist x-ray and follow up with Dr Young Rodrigues for an evaluation  I have provided a referral for her to see Dr Young Rodrigues at today's visit  The patient also reports that she is having neck pain with radiation to the right upper extremity  She has not yet undergone a course of physical therapy, so I felt a reasonable to have her undergo a course of physical therapy 2-3 times per week for 4-6 weeks  If she does not respond to physical therapy, I will obtain a MRI of the cervical spine to evaluate for any pathology that may be contributing to her pain complaints  Follow-up is planned with the patient after she completes her course of physical therapy  Discharge instructions were provided  I personally saw and examined the patient and I agree with the above discussed plan of care  History of Present Illness:    Godwin Matias is a 67 y o  female who presents to [facility] for interval re-evaluation of the above stated pain complaints  The patient has a past medical and chronic pain history as outlined in the assessment section  She was last seen on November 15, 2019 at which time she underwent a L4-L5 lumbar epidural steroid injection  At today's office visit, the patient's pain score is 6/10 on the verbal numerical pain rating scale  The patient states that her pain is worse in the morning and intermittent in nature  She reports the quality of her pain as dull/aching, sharp, cramping, and numbness    She is reporting excellent pain relief since undergoing the L4-L5 lumbar epidural steroid injection on November 15, 2019  She reports at least 50 percent relief of symptoms, if not more    She is complaining of right hand pain, right knee pain, and neck pain at today's visit after a slip and fall accident  Other than as stated above, the patient denies any interval changes in medications, medical condition, mental condition, symptoms, or allergies since the last office visit  Review of Systems:    Review of Systems   Constitutional: Negative for activity change and chills  HENT: Negative for ear pain, hearing loss and sinus pain  Eyes: Negative for photophobia  Respiratory: Negative for choking  Cardiovascular: Positive for chest pain  Gastrointestinal: Negative for nausea  Endocrine: Negative for polyphagia  Genitourinary: Negative for decreased urine volume, flank pain, genital sores, hematuria and pelvic pain  Musculoskeletal: Positive for arthralgias, back pain and gait problem  Decreased ROM  Muscle weakness  Joint stiffness       Skin: Negative for rash  Allergic/Immunologic: Negative for food allergies  Neurological: Positive for dizziness  Negative for seizures, syncope and headaches  Hematological: Does not bruise/bleed easily  Psychiatric/Behavioral: Negative for hallucinations  The patient is not nervous/anxious  Patient Active Problem List   Diagnosis    Chronic pain syndrome    Chronic bilateral low back pain with bilateral sciatica    Spinal stenosis of lumbar region with neurogenic claudication    Lumbar radiculopathy    Lumbar degenerative disc disease    Low back pain       Past Medical History:   Diagnosis Date    Diverticulosis     Glaucoma     Low back pain     Lumbar degenerative disc disease 9/16/2019    Peripheral neuropathy     Spinal stenosis        Past Surgical History:   Procedure Laterality Date    COLON SURGERY      resection    COLONOSCOPY N/A 7/11/2016    Procedure: COLONOSCOPY;  Surgeon: Queen Dianne MD;  Location: [place] [address] [facility] LAB;   Service:     COMBINED REDUCTION MAMMAPLASTY W/ LIPOSUCTION      EPIDURAL BLOCK INJECTION N/A 11/15/2019    Procedure: L4 L5 Lumbar Epidural Steroid Injection (34816); Surgeon: Shainka Medina MD;  Location: [facility] MAIN OR;  Service: Pain Management     HERNIA REPAIR      x3       Family History   Problem Relation Age of Onset    Heart disease Mother     Dementia Father     No Known Problems Sister     No Known Problems Brother     No Known Problems Maternal Aunt     No Known Problems Maternal Uncle     No Known Problems Paternal Aunt     No Known Problems Paternal Uncle     No Known Problems Maternal Grandmother     No Known Problems Maternal Grandfather     No Known Problems Paternal Grandmother     No Known Problems Paternal Grandfather        Social History     Occupational History    Not on file   Tobacco Use    Smoking status: Former Smoker     Last attempt to quit: 1976     Years since quittin 4    Smokeless tobacco: Never Used   Substance and Sexual Activity    Alcohol use: No    Drug use: No    Sexual activity: Not on file         Current Outpatient Medications:     aspirin (ASPIRIN LOW DOSE) 81 mg EC tablet, aspirin ec low dose 81 mg tbec, Disp: , Rfl:     atorvastatin (LIPITOR) 40 mg tablet, , Disp: , Rfl:     co-enzyme Q-10 50 MG capsule, Take 50 mg by mouth daily  , Disp: , Rfl:     COMFORT EZ PEN NEEDLES 31G X 6 MM MISC, , Disp: , Rfl:     diclofenac (VOLTAREN) 50 mg EC tablet, Take 1 PO BID PRN FOR Pain  Take with FOOD   NO OTHER NSAIDS , Disp: 60 tablet, Rfl: 0    diclofenac sodium (VOLTAREN) 1 %, , Disp: , Rfl:     diltiazem (TIAZAC) 180 MG 24 hr capsule, Take 180 mg by mouth daily, Disp: , Rfl: 3    gabapentin (NEURONTIN) 300 mg capsule, gabapentin 300 mg capsule, Disp: , Rfl:     liraglutide (VICTOZA) injection, Inject under the skin daily, Disp: , Rfl:     montelukast (SINGULAIR) 10 mg tablet, montelukast 10 mg tablet, Disp: , Rfl:     omeprazole (PriLOSEC) 40 MG capsule, omeprazole 40 mg cpdr, Disp: , Rfl:     ONE TOUCH ULTRA TEST test strip, USE AS DIRECTED TO test blood sugar TWICE DAILY, Disp: , Rfl: 1    PARoxetine (PAXIL) 10 mg tablet, , Disp: , Rfl:     Travoprost (TRAVATAN OP), Apply to eye daily at bedtime, Disp: , Rfl:     venlafaxine (EFFEXOR-XR) 75 mg 24 hr capsule, Take 75 mg by mouth daily, Disp: , Rfl: 0    No Known Allergies    Physical Exam:    /65   Pulse 79   Ht 5' 4" (1 626 m)   Wt 87 6 kg (193 lb 3 2 oz)   BMI 33 16 kg/m²     Constitutional:obese  Eyes:anicteric  HEENT:grossly intact  Neck:supple, symmetric, trachea midline and no masses   Pulmonary:even and unlabored  Cardiovascular:No edema or pitting edema present  Skin:Normal without rashes or lesions and well hydrated  Psychiatric:Mood and affect appropriate  Neurologic:Cranial Nerves II-XII grossly intact  Musculoskeletal:Tenderness noted over the right wrist     Cervical Spine Exam    Appearance:  Normal lordosis  Palpation/Tenderness:  no tenderness or spasm  Sensory:  no sensory deficits noted except: Decreased sensation noted in the bilateral hands  Range of Motion:  Flexion:  No limitation  with pain  Extension:  No limitation  with pain  Lateral Flexion - Left:  No limitation  with pain  Lateral Flexion - Right:  No limitation  with pain  Rotation - Left:  No limitation  with pain  Rotation - Right:  No limitation  with pain  Motor Strength:  Left Arm Flexion  5/5  Left Arm Extension  5/5  Right Arm Flexion  5/5  Right Arm Extension  5/5  Left Wrist Flexion  5/5  Left Wrist Extension  5/5  Left Finger Abduction  5/5  Right Finger Abduction  5/5  Left Pincer Grasp  5/5  Right Pincer Grasp  5/5  Left    5/5  Right   5/5  Reflexes:  Left Biceps:  2+   Right Biceps:  2+   Left Brachioradialis:  2+   Right Brachioradialis:  2+   Left Triceps:  2+   Right Triceps:  2+   Special Tests:  Left Spurlings:  negative  Right Spurlings  negative          Imaging  XR wrist 3+ vw right    (Results Pending)         Orders Placed This Encounter   Procedures    XR wrist 3+ vw right    Ambulatory referral to Physical Therapy    Ambulatory referral to Hand Surgery

## 2020-01-08 DIAGNOSIS — I10 HYPERTENSION: Primary | ICD-10-CM

## 2020-01-08 RX ORDER — DILTIAZEM HYDROCHLORIDE 180 MG/1
180 CAPSULE, EXTENDED RELEASE ORAL DAILY
Qty: 90 CAPSULE | Refills: 3 | Status: SHIPPED | OUTPATIENT
Start: 2020-01-08 | End: 2021-07-15 | Stop reason: SDUPTHER

## 2020-02-18 ENCOUNTER — TELEPHONE (OUTPATIENT)
Dept: PAIN MEDICINE | Facility: CLINIC | Age: 80
End: 2020-02-18

## 2020-02-18 ENCOUNTER — TELEPHONE (OUTPATIENT)
Dept: OBGYN CLINIC | Facility: HOSPITAL | Age: 80
End: 2020-02-18

## 2020-02-18 DIAGNOSIS — G89.29 CHRONIC BILATERAL LOW BACK PAIN WITH BILATERAL SCIATICA: ICD-10-CM

## 2020-02-18 DIAGNOSIS — G89.4 CHRONIC PAIN SYNDROME: Primary | ICD-10-CM

## 2020-02-18 DIAGNOSIS — M54.16 LUMBAR RADICULOPATHY: ICD-10-CM

## 2020-02-18 DIAGNOSIS — M54.41 CHRONIC BILATERAL LOW BACK PAIN WITH BILATERAL SCIATICA: ICD-10-CM

## 2020-02-18 DIAGNOSIS — M54.42 CHRONIC BILATERAL LOW BACK PAIN WITH BILATERAL SCIATICA: ICD-10-CM

## 2020-02-18 DIAGNOSIS — M51.36 LUMBAR DEGENERATIVE DISC DISEASE: ICD-10-CM

## 2020-02-18 DIAGNOSIS — M48.062 SPINAL STENOSIS OF LUMBAR REGION WITH NEUROGENIC CLAUDICATION: ICD-10-CM

## 2020-02-18 NOTE — TELEPHONE ENCOUNTER
Patient   [phone]  Dr Megan Roy     Patient is requesting a call back  Her pain level is a 7/10 she is very achy, she does have a appt on 2/24/20 to see the Dr  She is even having a time walking at time  She is asking if the dr can give her something for her pain until she is able to come in on Monday  She does take medical Heemma Osullivan but does not like being high all the time    Please send script to [address] [place] [address], [address] [facility]

## 2020-02-18 NOTE — TELEPHONE ENCOUNTER
Tiffany Casey  [phone]    Dr Donavon Scales    Patient would like to start authorization for next series of Gel injections

## 2020-02-19 RX ORDER — DICLOFENAC SODIUM 75 MG/1
75 TABLET, DELAYED RELEASE ORAL 2 TIMES DAILY PRN
Qty: 60 TABLET | Refills: 0 | Status: SHIPPED | OUTPATIENT
Start: 2020-02-19 | End: 2020-11-08

## 2020-02-19 NOTE — TELEPHONE ENCOUNTER
S/W pt, she states she would rather stick with Diclofenac since she knows it works for her  Please send to [facility] on file  States she did not f/u with specialist for her wrist because that is not what is really bothering her, plus she does she a lot of Drs and cannot make all of the appts due to ride issues right now    States she did not go to PT because of using Med Marijuana, states "I could go, but I'd be high "  Advised pt to expect Rx to be sent  Pt states she will f/u with AS on everything at [facility] 2/24/2020

## 2020-02-19 NOTE — TELEPHONE ENCOUNTER
I don't mind prescribing her diclofenac or etodolac (which does not look like a medication she has tried before, so it may work better for her)  If she wants to give etodolac a try, I am happy to send that to her pharmacy  It does not look like she did any of the PT or follow up with the specialists that I recommended in my last office note  Was there an issue with this?

## 2020-02-19 NOTE — TELEPHONE ENCOUNTER
See initial entry below  S/W pt who states her whole body hurts  States she does have Medical Marijuana that she takes at night before bed, but it still does not help pain through the whole night and she does not get much sleep  States she has taken Gabapentin in the past which did not help  The only thing that did help at one time was the Diclofenac, but long term, it makes her stomach upset  She is looking for help with pain before OV 2/24  Offered for pt to be seen tomorrow but declined due to ride issues  Pt willing to try Diclofenac short term to get her to [facility] appt, or she is willing to stop medical marijuana for something stronger  Advised would need OV for anything stronger  Can Diclofenac be sent to get her to her appt?

## 2020-02-21 NOTE — TELEPHONE ENCOUNTER
Patient states it is not available at [address] [place] [address] can you please Double check, she called 10 minutes ago and it wasn't available  Thank you       [phone] [facility]

## 2020-02-24 ENCOUNTER — OFFICE VISIT (OUTPATIENT)
Dept: PAIN MEDICINE | Facility: CLINIC | Age: 80
End: 2020-02-24
Payer: MEDICARE

## 2020-02-24 VITALS
HEIGHT: 64 IN | DIASTOLIC BLOOD PRESSURE: 83 MMHG | WEIGHT: 196 LBS | SYSTOLIC BLOOD PRESSURE: 130 MMHG | HEART RATE: 84 BPM | BODY MASS INDEX: 33.46 KG/M2

## 2020-02-24 DIAGNOSIS — G89.4 CHRONIC PAIN SYNDROME: Primary | ICD-10-CM

## 2020-02-24 DIAGNOSIS — M54.16 LUMBAR RADICULOPATHY: ICD-10-CM

## 2020-02-24 DIAGNOSIS — G89.29 CHRONIC BILATERAL LOW BACK PAIN WITH BILATERAL SCIATICA: ICD-10-CM

## 2020-02-24 DIAGNOSIS — M54.41 CHRONIC BILATERAL LOW BACK PAIN WITH BILATERAL SCIATICA: ICD-10-CM

## 2020-02-24 DIAGNOSIS — M51.36 LUMBAR DEGENERATIVE DISC DISEASE: ICD-10-CM

## 2020-02-24 DIAGNOSIS — M54.42 CHRONIC BILATERAL LOW BACK PAIN WITH BILATERAL SCIATICA: ICD-10-CM

## 2020-02-24 DIAGNOSIS — M48.062 SPINAL STENOSIS OF LUMBAR REGION WITH NEUROGENIC CLAUDICATION: ICD-10-CM

## 2020-02-24 PROBLEM — M51.369 DEGENERATION OF LUMBAR INTERVERTEBRAL DISC: Status: ACTIVE | Noted: 2020-02-24

## 2020-02-24 PROCEDURE — 99214 OFFICE O/P EST MOD 30 MIN: CPT | Performed by: ANESTHESIOLOGY

## 2020-02-24 NOTE — PROGRESS NOTES
Pain Medicine Follow-Up Note    Assessment:  1  Chronic pain syndrome    2  Chronic bilateral low back pain with bilateral sciatica    3  Lumbar radiculopathy    4  Lumbar degenerative disc disease    5  Spinal stenosis of lumbar region with neurogenic claudication        Plan:  My impressions and treatment recommendations were discussed in detail with the patient who verbalized understanding and had no further questions  The patient reports that her pain is primarily in her low back with radiation to the bilateral lower extremities  She reports that the effect of the lumbar epidural steroid injection from November 15, 2019 has worn off and she would like to undergo repeat injection at this time  She did report excellent pain relief for several months duration since undergoing that injection  As such, I felt a reasonable to repeat the injection at this time  The procedures, its risks, and benefits were explained in detail to the patient  Risks include but are not limited to bleeding, infection, hematoma formation, abscess formation, weakness, headache, failure the pain to improve, nerve irritation or damage, and potential worsening of the pain  The patient verbalized understanding and wished to proceed with the procedure  I encouraged the patient to at least start physical therapy for her neck and shoulder area  She is asking for something to be done regarding and, but unfortunately, I have not been able to get any imaging due to her not doing physical therapy  I encouraged her to restart physical therapy at this time  Follow-up is planned in 4 weeks time or sooner as warranted  Discharge instructions were provided  I personally saw and examined the patient and I agree with the above discussed plan of care  History of Present Illness:    Prabhakar Hawkins is a 67 y o  female who presents to [facility] for interval re-evaluation of the above stated pain complaints   The patient has a past medical and chronic pain history as outlined in the assessment section  She was last seen on December 16, 2019  At today's office visit, the patient's pain score is 7/10 on the verbal numerical pain rating scale  The patient states that her pain is primarily in her low back with radiation into the bilateral lower extremities  She describes her pain as worse in the morning and constant in nature  She reports the quality of her pain as open condition dull/aching, sharp, throbbing, cramping, shooting, numbness, and pins and needles   She is reporting that her pain has returned since she underwent the L4-L5 lumbar epidural steroid injection on November 15, 2019  She would like to undergo repeat injection at today's visit  Other than as stated above, the patient denies any interval changes in medications, medical condition, mental condition, symptoms, or allergies since the last office visit  Review of Systems:    Review of Systems   Respiratory: Negative for shortness of breath  Cardiovascular: Negative for chest pain  Gastrointestinal: Negative for constipation, diarrhea, nausea and vomiting  Musculoskeletal: Positive for gait problem  Negative for arthralgias, joint swelling and myalgias  Difficulty walking   Joint stiffness   Swelling  Pain      Skin: Negative for rash  Neurological: Positive for weakness  Negative for dizziness and seizures  All other systems reviewed and are negative          Patient Active Problem List   Diagnosis    Chronic pain syndrome    Chronic bilateral low back pain with bilateral sciatica    Spinal stenosis of lumbar region with neurogenic claudication    Lumbar radiculopathy    Lumbar degenerative disc disease    Low back pain       Past Medical History:   Diagnosis Date    Diverticulosis     Glaucoma     Low back pain     Lumbar degenerative disc disease 9/16/2019    Peripheral neuropathy     Spinal stenosis        Past Surgical History:   Procedure Laterality Date    COLON SURGERY      resection    COLONOSCOPY N/A 2016    Procedure: COLONOSCOPY;  Surgeon: Jose Guadalupe Mendez MD;  Location: [facility]; Service:     COMBINED REDUCTION MAMMAPLASTY W/ LIPOSUCTION      EPIDURAL BLOCK INJECTION N/A 11/15/2019    Procedure: L4 L5 Lumbar Epidural Steroid Injection (57999); Surgeon: Jo Shay MD;  Location: [facility] MAIN OR;  Service: Pain Management     HERNIA REPAIR      x3       Family History   Problem Relation Age of Onset    Heart disease Mother     Dementia Father     No Known Problems Sister     No Known Problems Brother     No Known Problems Maternal Aunt     No Known Problems Maternal Uncle     No Known Problems Paternal Aunt     No Known Problems Paternal Uncle     No Known Problems Maternal Grandmother     No Known Problems Maternal Grandfather     No Known Problems Paternal Grandmother     No Known Problems Paternal Grandfather        Social History     Occupational History    Not on file   Tobacco Use    Smoking status: Former Smoker     Last attempt to quit: 1976     Years since quittin 6    Smokeless tobacco: Never Used   Substance and Sexual Activity    Alcohol use: No    Drug use: No    Sexual activity: Not on file         Current Outpatient Medications:     aspirin (ASPIRIN LOW DOSE) 81 mg EC tablet, aspirin ec low dose 81 mg tbec, Disp: , Rfl:     atorvastatin (LIPITOR) 40 mg tablet, , Disp: , Rfl:     co-enzyme Q-10 50 MG capsule, Take 50 mg by mouth daily  , Disp: , Rfl:     COMFORT EZ PEN NEEDLES 31G X 6 MM MISC, , Disp: , Rfl:     diclofenac (VOLTAREN) 75 mg EC tablet, Take 1 tablet (75 mg total) by mouth 2 (two) times a day as needed (pain (with food)), Disp: 60 tablet, Rfl: 0    diclofenac sodium (VOLTAREN) 1 %, , Disp: , Rfl:     diltiazem (TIAZAC) 180 MG 24 hr capsule, Take 1 capsule (180 mg total) by mouth daily, Disp: 90 capsule, Rfl: 3    gabapentin (NEURONTIN) 300 mg capsule, gabapentin 300 mg capsule, Disp: , Rfl:     liraglutide (VICTOZA) injection, Inject under the skin daily, Disp: , Rfl:     montelukast (SINGULAIR) 10 mg tablet, montelukast 10 mg tablet, Disp: , Rfl:     omeprazole (PriLOSEC) 40 MG capsule, omeprazole 40 mg cpdr, Disp: , Rfl:     ONE TOUCH ULTRA TEST test strip, USE AS DIRECTED TO test blood sugar TWICE DAILY, Disp: , Rfl: 1    PARoxetine (PAXIL) 10 mg tablet, , Disp: , Rfl:     Travoprost (TRAVATAN OP), Apply to eye daily at bedtime, Disp: , Rfl:     venlafaxine (EFFEXOR-XR) 75 mg 24 hr capsule, Take 75 mg by mouth daily, Disp: , Rfl: 0    No Known Allergies    Physical Exam:    /83   Pulse 84   Ht 5' 4" (1 626 m)   Wt 88 9 kg (196 lb)   BMI 33 64 kg/m²     Constitutional:obese  Eyes:anicteric  HEENT:grossly intact  Neck:supple, symmetric, trachea midline and no masses   Pulmonary:even and unlabored  Cardiovascular:No edema or pitting edema present  Skin:Normal without rashes or lesions and well hydrated  Psychiatric:Mood and affect appropriate  Neurologic:Cranial Nerves II-XII grossly intact  Musculoskeletal:antalgic

## 2020-02-24 NOTE — H&P (VIEW-ONLY)
Pain Medicine Follow-Up Note    Assessment:  1  Chronic pain syndrome    2  Chronic bilateral low back pain with bilateral sciatica    3  Lumbar radiculopathy    4  Lumbar degenerative disc disease    5  Spinal stenosis of lumbar region with neurogenic claudication        Plan:  My impressions and treatment recommendations were discussed in detail with the patient who verbalized understanding and had no further questions  The patient reports that her pain is primarily in her low back with radiation to the bilateral lower extremities  She reports that the effect of the lumbar epidural steroid injection from November 15, 2019 has worn off and she would like to undergo repeat injection at this time  She did report excellent pain relief for several months duration since undergoing that injection  As such, I felt a reasonable to repeat the injection at this time  The procedures, its risks, and benefits were explained in detail to the patient  Risks include but are not limited to bleeding, infection, hematoma formation, abscess formation, weakness, headache, failure the pain to improve, nerve irritation or damage, and potential worsening of the pain  The patient verbalized understanding and wished to proceed with the procedure  I encouraged the patient to at least start physical therapy for her neck and shoulder area  She is asking for something to be done regarding and, but unfortunately, I have not been able to get any imaging due to her not doing physical therapy  I encouraged her to restart physical therapy at this time  Follow-up is planned in 4 weeks time or sooner as warranted  Discharge instructions were provided  I personally saw and examined the patient and I agree with the above discussed plan of care  History of Present Illness:    Monda Schilder is a 67 y o  female who presents to [facility] for interval re-evaluation of the above stated pain complaints   The patient has a past medical and chronic pain history as outlined in the assessment section  She was last seen on December 16, 2019  At today's office visit, the patient's pain score is 7/10 on the verbal numerical pain rating scale  The patient states that her pain is primarily in her low back with radiation into the bilateral lower extremities  She describes her pain as worse in the morning and constant in nature  She reports the quality of her pain as open condition dull/aching, sharp, throbbing, cramping, shooting, numbness, and pins and needles   She is reporting that her pain has returned since she underwent the L4-L5 lumbar epidural steroid injection on November 15, 2019  She would like to undergo repeat injection at today's visit  Other than as stated above, the patient denies any interval changes in medications, medical condition, mental condition, symptoms, or allergies since the last office visit  Review of Systems:    Review of Systems   Respiratory: Negative for shortness of breath  Cardiovascular: Negative for chest pain  Gastrointestinal: Negative for constipation, diarrhea, nausea and vomiting  Musculoskeletal: Positive for gait problem  Negative for arthralgias, joint swelling and myalgias  Difficulty walking   Joint stiffness   Swelling  Pain      Skin: Negative for rash  Neurological: Positive for weakness  Negative for dizziness and seizures  All other systems reviewed and are negative          Patient Active Problem List   Diagnosis    Chronic pain syndrome    Chronic bilateral low back pain with bilateral sciatica    Spinal stenosis of lumbar region with neurogenic claudication    Lumbar radiculopathy    Lumbar degenerative disc disease    Low back pain       Past Medical History:   Diagnosis Date    Diverticulosis     Glaucoma     Low back pain     Lumbar degenerative disc disease 9/16/2019    Peripheral neuropathy     Spinal stenosis        Past Surgical History:   Procedure Laterality Date    COLON SURGERY      resection    COLONOSCOPY N/A 2016    Procedure: COLONOSCOPY;  Surgeon: Ran Gutierrez MD;  Location: [place] [address] GI LAB; Service:     COMBINED REDUCTION MAMMAPLASTY W/ LIPOSUCTION      EPIDURAL BLOCK INJECTION N/A 11/15/2019    Procedure: L4 L5 Lumbar Epidural Steroid Injection (93345); Surgeon: Landy Saravia MD;  Location: [facility] MAIN OR;  Service: Pain Management     HERNIA REPAIR      x3       Family History   Problem Relation Age of Onset    Heart disease Mother     Dementia Father     No Known Problems Sister     No Known Problems Brother     No Known Problems Maternal Aunt     No Known Problems Maternal Uncle     No Known Problems Paternal Aunt     No Known Problems Paternal Uncle     No Known Problems Maternal Grandmother     No Known Problems Maternal Grandfather     No Known Problems Paternal Grandmother     No Known Problems Paternal Grandfather        Social History     Occupational History    Not on file   Tobacco Use    Smoking status: Former Smoker     Last attempt to quit: 1976     Years since quittin 6    Smokeless tobacco: Never Used   Substance and Sexual Activity    Alcohol use: No    Drug use: No    Sexual activity: Not on file         Current Outpatient Medications:     aspirin (ASPIRIN LOW DOSE) 81 mg EC tablet, aspirin ec low dose 81 mg tbec, Disp: , Rfl:     atorvastatin (LIPITOR) 40 mg tablet, , Disp: , Rfl:     co-enzyme Q-10 50 MG capsule, Take 50 mg by mouth daily  , Disp: , Rfl:     COMFORT EZ PEN NEEDLES 31G X 6 MM MISC, , Disp: , Rfl:     diclofenac (VOLTAREN) 75 mg EC tablet, Take 1 tablet (75 mg total) by mouth 2 (two) times a day as needed (pain (with food)), Disp: 60 tablet, Rfl: 0    diclofenac sodium (VOLTAREN) 1 %, , Disp: , Rfl:     diltiazem (TIAZAC) 180 MG 24 hr capsule, Take 1 capsule (180 mg total) by mouth daily, Disp: 90 capsule, Rfl: 3    gabapentin (NEURONTIN) 300 mg capsule, gabapentin 300 mg capsule, Disp: , Rfl:     liraglutide (VICTOZA) injection, Inject under the skin daily, Disp: , Rfl:     montelukast (SINGULAIR) 10 mg tablet, montelukast 10 mg tablet, Disp: , Rfl:     omeprazole (PriLOSEC) 40 MG capsule, omeprazole 40 mg cpdr, Disp: , Rfl:     ONE TOUCH ULTRA TEST test strip, USE AS DIRECTED TO test blood sugar TWICE DAILY, Disp: , Rfl: 1    PARoxetine (PAXIL) 10 mg tablet, , Disp: , Rfl:     Travoprost (TRAVATAN OP), Apply to eye daily at bedtime, Disp: , Rfl:     venlafaxine (EFFEXOR-XR) 75 mg 24 hr capsule, Take 75 mg by mouth daily, Disp: , Rfl: 0    No Known Allergies    Physical Exam:    /83   Pulse 84   Ht 5' 4" (1 626 m)   Wt 88 9 kg (196 lb)   BMI 33 64 kg/m²     Constitutional:obese  Eyes:anicteric  HEENT:grossly intact  Neck:supple, symmetric, trachea midline and no masses   Pulmonary:even and unlabored  Cardiovascular:No edema or pitting edema present  Skin:Normal without rashes or lesions and well hydrated  Psychiatric:Mood and affect appropriate  Neurologic:Cranial Nerves II-XII grossly intact  Musculoskeletal:antalgic

## 2020-02-25 ENCOUNTER — TELEPHONE (OUTPATIENT)
Dept: PAIN MEDICINE | Facility: CLINIC | Age: 80
End: 2020-02-25

## 2020-02-25 NOTE — TELEPHONE ENCOUNTER
Patient called requesting to get exact location of where she is to report to to have her procedure done scheduled on 3/5/20  Her paperwork doesn't indication location  She would like a call to clarify information since she needs to relay this to her transportation asap   Please advise, toan    Call back# [phone]

## 2020-02-25 NOTE — TELEPHONE ENCOUNTER
Spoke with patient she was already made aware of all of this during scheduling yesterday  Went over information again

## 2020-03-05 ENCOUNTER — TELEPHONE (OUTPATIENT)
Dept: PAIN MEDICINE | Facility: CLINIC | Age: 80
End: 2020-03-05

## 2020-03-05 NOTE — TELEPHONE ENCOUNTER
Pt cancelled procedure for 3/5/20 due to transportation  I told her I would reschedule her for next week 3/12/20 so se can call transportation and set up a ride for 12:30 arrival  Waiting for her to [facility] and confirm   Please transfer to [facility] [phone]

## 2020-03-12 ENCOUNTER — HOSPITAL ENCOUNTER (OUTPATIENT)
Facility: AMBULARY SURGERY CENTER | Age: 80
Setting detail: OUTPATIENT SURGERY
Discharge: HOME | End: 2020-03-12
Attending: ANESTHESIOLOGY | Admitting: ANESTHESIOLOGY
Payer: MEDICARE

## 2020-03-12 ENCOUNTER — APPOINTMENT (OUTPATIENT)
Dept: RADIOLOGY | Facility: HOSPITAL | Age: 80
End: 2020-03-12
Payer: MEDICARE

## 2020-03-12 VITALS
SYSTOLIC BLOOD PRESSURE: 152 MMHG | RESPIRATION RATE: 18 BRPM | DIASTOLIC BLOOD PRESSURE: 85 MMHG | HEART RATE: 99 BPM | OXYGEN SATURATION: 97 % | TEMPERATURE: 99 F

## 2020-03-12 PROCEDURE — 72020 X-RAY EXAM OF SPINE 1 VIEW: CPT

## 2020-03-12 PROCEDURE — 62323 NJX INTERLAMINAR LMBR/SAC: CPT | Performed by: ANESTHESIOLOGY

## 2020-03-12 RX ORDER — LIDOCAINE WITH 8.4% SOD BICARB 0.9%(10ML)
SYRINGE (ML) INJECTION AS NEEDED
Status: DISCONTINUED | OUTPATIENT
Start: 2020-03-12 | End: 2020-03-12 | Stop reason: HOSPADM

## 2020-03-12 RX ORDER — MAGNESIUM HYDROXIDE 1200 MG/15ML
LIQUID ORAL AS NEEDED
Status: DISCONTINUED | OUTPATIENT
Start: 2020-03-12 | End: 2020-03-12 | Stop reason: HOSPADM

## 2020-03-12 RX ORDER — METHYLPREDNISOLONE ACETATE 80 MG/ML
INJECTION, SUSPENSION INTRA-ARTICULAR; INTRALESIONAL; INTRAMUSCULAR; SOFT TISSUE AS NEEDED
Status: DISCONTINUED | OUTPATIENT
Start: 2020-03-12 | End: 2020-03-12 | Stop reason: HOSPADM

## 2020-03-12 NOTE — OP NOTE
ATTENDING PHYSICIAN:  Janeth Bernardo MD     PROCEDURE:  Lumbar interlaminar midline epidural steroid injection with steroid and local anesthetic under fluoroscopy at the L4-L5 level  PREPROCEDURE DIAGNOSIS:  Low back pain and lower extremity radicular symptoms  POSTPROCEDURE DIAGNOSIS:  Low back pain and lower extremity radicular symptoms  ANESTHESIA:  Local     ESTIMATED BLOOD LOSS:  Minimal     COMPLICATIONS:  None  LOCATION:  [facility]  CONSENT:  Today's procedure, its potential benefits as well as its risks and potential side effects were reviewed  Discussed risks of the procedure including bleeding, infection, nerve irritation or damage, reactions to the medications, headache, failure of the pain to improve, and potential worsening of the pain were explained to the patient who verbalized understanding and who wished to proceed  Written informed consent was thereby obtained  DESCRIPTION OF THE PROCEDURE:  After written informed consent was obtained, the patient was taken to the fluoroscopy suite and placed in the prone position  Anatomical landmarks were identified by way of fluoroscopy in multiple views  The skin of the lumbar region was prepped and draped in the usual sterile fashion  Strict aseptic technique was utilized  The skin and subcutaneous tissues at the needle entry site were infiltrated with 3 mL of 1% preservative-free lidocaine using a 25-gauge 1-1/2-inch needle  A 20-gauge Tuohy needle was then incrementally advanced under fluoroscopy using a loss of resistance technique  Upon entering into the epidural space, a positive loss of resistance to air was noted and a characteristic "pop" was felt   Proper placement into the epidural space was confirmed with fluoroscopy in multiple views and by continued loss of resistance after injection of 1 mL of sterile preservative-free normal saline as well as the administration of contrast to delineate the epidural space  There were no paresthesias reported  After negative aspiration for CSF or heme, a 6 mL injectate consisting of 1 mL of Depo-Medrol 80 mg/mL and 1 mL of Depo-Medrol 40 mg/mL mixed with 4 mL of preservative-free normal saline was slowly injected  The patient tolerated the procedure well and all needles were removed with the tips intact  Hemostasis was maintained  There were no apparent paresthesias or complications  The skin was wiped clean and a Band-Aid was placed as appropriate  The patient was monitored for an appropriate period of time following the procedure and remained hemodynamically stable and neurovascularly intact following the procedure  The patient was ultimately discharged to home with supervision in good condition and instructed to call the office in a few days for an update or sooner as warranted  I was present and participated in all key and critical portions of this procedure      Ivonne Cassidy MD  3/12/2020  1:22 PM

## 2020-03-12 NOTE — INTERVAL H&P NOTE
H&P reviewed  After examining the patient I find no changes in the patients condition since the H&P had been written      Vitals:    03/12/20 1247   BP: 141/89   Pulse: 98   Resp: 16   Temp: 99 °F (37 2 °C)   SpO2: 95%

## 2020-03-12 NOTE — DISCHARGE INSTRUCTIONS
Epidural Steroid Injection   WHAT YOU NEED TO KNOW:   An epidural steroid injection (CELESTINA) is a procedure to inject steroid medicine into the epidural space  The epidural space is between your spinal cord and vertebrae  Steroids reduce inflammation and fluid buildup in your spine that may be causing pain  You may be given pain medicine along with the steroids  ACTIVITY  · Do not drive or operate machinery today  · No strenuous activity today - bending, lifting, etc   · You may resume normal activites starting tomorrow - start slowly and as tolerated  · You may shower today, but no tub baths or hot tubs  · You may have numbness for several hours from the local anesthetic  Please use caution and common sense, especially with weight-bearing activities  CARE OF THE INJECTION SITE  · If you have soreness or pain, apply ice to the area today (20 minutes on/20 minutes off)  · Starting tomorrow, you may use warm, moist heat or ice if needed  · You may have an increase or change in your discomfort for 36-48 hours after your treatment  · Apply ice and continue with any pain medication you have been prescribed  · Notify [facility] if you have any of the following: redness, drainage, swelling, headache, stiff neck or fever above 100°F     SPECIAL INSTRUCTIONS  · Our office will contact you in approximately 7 days for a progress report  MEDICATIONS  · Continue to take all routine medications  · Our office may have instructed you to hold some medications  If you have a problem specifically related to your procedure, please call our office at [phone]  Problems not related to your procedure should be directed to your primary care physician

## 2020-03-16 ENCOUNTER — TELEPHONE (OUTPATIENT)
Dept: OBGYN CLINIC | Facility: CLINIC | Age: 80
End: 2020-03-16

## 2020-03-16 NOTE — TELEPHONE ENCOUNTER
Dr Collazo   #: [phone]              Patient is calling in requesting a call back  She wants to know why is she only getting gel injections on her right knee when she has been getting injections on B/L knee? She's requesting an order to be put in place for her L knee as well

## 2020-03-18 ENCOUNTER — OFFICE VISIT (OUTPATIENT)
Dept: OBGYN CLINIC | Facility: CLINIC | Age: 80
End: 2020-03-18
Payer: MEDICARE

## 2020-03-18 DIAGNOSIS — M17.11 PRIMARY OSTEOARTHRITIS OF RIGHT KNEE: ICD-10-CM

## 2020-03-18 DIAGNOSIS — M17.12 PRIMARY OSTEOARTHRITIS OF LEFT KNEE: Primary | ICD-10-CM

## 2020-03-18 PROCEDURE — 20610 DRAIN/INJ JOINT/BURSA W/O US: CPT | Performed by: PHYSICIAN ASSISTANT

## 2020-03-18 RX ORDER — HYALURONATE SODIUM 10 MG/ML
20 SYRINGE (ML) INTRAARTICULAR
Status: COMPLETED | OUTPATIENT
Start: 2020-03-18 | End: 2020-03-18

## 2020-03-18 RX ADMIN — Medication 20 MG: at 13:03

## 2020-03-18 NOTE — PROGRESS NOTES
Assessment/Plan:  1  Primary osteoarthritis of left knee     2  Primary osteoarthritis of right knee         Follow-up 1 week  Subjective:   Petrona Ham is a 67 y o  female who presents today for euflexxa #1 bilateral knees  Review of Systems      Past Medical History:   Diagnosis Date    Diverticulosis     Glaucoma     Low back pain     Lumbar degenerative disc disease 2019    Peripheral neuropathy     Spinal stenosis        Past Surgical History:   Procedure Laterality Date    COLON SURGERY      resection    COLONOSCOPY N/A 2016    Procedure: COLONOSCOPY;  Surgeon: Marcela Luu MD;  Location: [place] [address] GI LAB; Service:     COMBINED REDUCTION MAMMAPLASTY W/ LIPOSUCTION      EPIDURAL BLOCK INJECTION N/A 11/15/2019    Procedure: L4 L5 Lumbar Epidural Steroid Injection (83864); Surgeon: Miya Lawrence MD;  Location: [facility] MAIN OR;  Service: Pain Management     EPIDURAL BLOCK INJECTION N/A 3/12/2020    Procedure: L4 L5 Lumbar Epidural Steroid Injection (83465);   Surgeon: Miya Lawrence MD;  Location: [facility] MAIN OR;  Service: Pain Management     HERNIA REPAIR      x3       Family History   Problem Relation Age of Onset    Heart disease Mother     Dementia Father     No Known Problems Sister     No Known Problems Brother     No Known Problems Maternal Aunt     No Known Problems Maternal Uncle     No Known Problems Paternal Aunt     No Known Problems Paternal Uncle     No Known Problems Maternal Grandmother     No Known Problems Maternal Grandfather     No Known Problems Paternal Grandmother     No Known Problems Paternal Grandfather        Social History     Occupational History    Not on file   Tobacco Use    Smoking status: Former Smoker     Last attempt to quit: 1976     Years since quittin 7    Smokeless tobacco: Never Used   Substance and Sexual Activity    Alcohol use: No    Drug use: No    Sexual activity: Not on file         Current Outpatient Medications:     aspirin (ASPIRIN LOW DOSE) 81 mg EC tablet, aspirin ec low dose 81 mg tbec, Disp: , Rfl:     atorvastatin (LIPITOR) 40 mg tablet, , Disp: , Rfl:     co-enzyme Q-10 50 MG capsule, Take 50 mg by mouth daily  , Disp: , Rfl:     COMFORT EZ PEN NEEDLES 31G X 6 MM MISC, , Disp: , Rfl:     diclofenac (VOLTAREN) 75 mg EC tablet, Take 1 tablet (75 mg total) by mouth 2 (two) times a day as needed (pain (with food)), Disp: 60 tablet, Rfl: 0    diclofenac sodium (VOLTAREN) 1 %, , Disp: , Rfl:     diltiazem (TIAZAC) 180 MG 24 hr capsule, Take 1 capsule (180 mg total) by mouth daily, Disp: 90 capsule, Rfl: 3    liraglutide (VICTOZA) injection, Inject under the skin daily, Disp: , Rfl:     montelukast (SINGULAIR) 10 mg tablet, montelukast 10 mg tablet, Disp: , Rfl:     omeprazole (PriLOSEC) 40 MG capsule, omeprazole 40 mg cpdr, Disp: , Rfl:     ONE TOUCH ULTRA TEST test strip, USE AS DIRECTED TO test blood sugar TWICE DAILY, Disp: , Rfl: 1    PARoxetine (PAXIL) 10 mg tablet, , Disp: , Rfl:     Travoprost (TRAVATAN OP), Apply to eye daily at bedtime, Disp: , Rfl:     venlafaxine (EFFEXOR-XR) 75 mg 24 hr capsule, Take 75 mg by mouth daily, Disp: , Rfl: 0    No Known Allergies    Objective: There were no vitals filed for this visit      Ortho Exam    Physical Exam    Large joint arthrocentesis: L knee  Date/Time: 3/18/2020 1:03 PM  Consent given by: patient  Site marked: site marked  Supporting Documentation  Indications: pain   Procedure Details  Location: knee - L knee  Preparation: Patient was prepped and draped in the usual sterile fashion (Alcohol prep)  Needle size: 22 G  Ultrasound guidance: no  Approach: anterolateral  Medications administered: 20 mg Sodium Hyaluronate 20 MG/2ML    Patient tolerance: patient tolerated the procedure well with no immediate complications  Dressing:  Sterile dressing applied    Large joint arthrocentesis: R knee  Date/Time: 3/18/2020 1:03 PM  Consent given by: patient  Site marked: site marked  Supporting Documentation  Indications: pain   Procedure Details  Location: knee - R knee  Preparation: Patient was prepped and draped in the usual sterile fashion (Alcohol prep)  Needle size: 22 G  Ultrasound guidance: no  Approach: anterolateral  Medications administered: 20 mg Sodium Hyaluronate 20 MG/2ML    Patient tolerance: patient tolerated the procedure well with no immediate complications  Dressing:  Sterile dressing applied

## 2020-03-19 ENCOUNTER — TELEPHONE (OUTPATIENT)
Dept: PAIN MEDICINE | Facility: CLINIC | Age: 80
End: 2020-03-19

## 2020-03-25 ENCOUNTER — OFFICE VISIT (OUTPATIENT)
Dept: OBGYN CLINIC | Facility: CLINIC | Age: 80
End: 2020-03-25
Payer: MEDICARE

## 2020-03-25 DIAGNOSIS — M17.12 PRIMARY OSTEOARTHRITIS OF LEFT KNEE: Primary | ICD-10-CM

## 2020-03-25 DIAGNOSIS — M17.11 PRIMARY OSTEOARTHRITIS OF RIGHT KNEE: ICD-10-CM

## 2020-03-25 PROCEDURE — 20610 DRAIN/INJ JOINT/BURSA W/O US: CPT | Performed by: PHYSICIAN ASSISTANT

## 2020-03-25 RX ORDER — HYALURONATE SODIUM 10 MG/ML
20 SYRINGE (ML) INTRAARTICULAR
Status: COMPLETED | OUTPATIENT
Start: 2020-03-25 | End: 2020-03-25

## 2020-03-25 RX ADMIN — Medication 20 MG: at 13:02

## 2020-03-25 RX ADMIN — Medication 20 MG: at 13:01

## 2020-03-25 NOTE — PROGRESS NOTES
Assessment/Plan:  1  Primary osteoarthritis of left knee     2  Primary osteoarthritis of right knee         Follow-up 1 week  Subjective:   Rangel yMers is a 67 y o  female who presents today for euflexxa #2 bilateral knees  Review of Systems      Past Medical History:   Diagnosis Date    Diverticulosis     Glaucoma     Low back pain     Lumbar degenerative disc disease 2019    Peripheral neuropathy     Spinal stenosis        Past Surgical History:   Procedure Laterality Date    COLON SURGERY      resection    COLONOSCOPY N/A 2016    Procedure: COLONOSCOPY;  Surgeon: Opal Iverson MD;  [facility]; Service:     COMBINED REDUCTION MAMMAPLASTY W/ LIPOSUCTION      EPIDURAL BLOCK INJECTION N/A 11/15/2019    Procedure: L4 L5 Lumbar Epidural Steroid Injection (92371); Surgeon: Anna Sutherland MD;  Location: [facility] MAIN OR;  Service: Pain Management     EPIDURAL BLOCK INJECTION N/A 3/12/2020    Procedure: L4 L5 Lumbar Epidural Steroid Injection (50777);   Surgeon: Anna Sutherland MD;  Location: [facility] MAIN OR;  Service: Pain Management     HERNIA REPAIR      x3       Family History   Problem Relation Age of Onset    Heart disease Mother     Dementia Father     No Known Problems Sister     No Known Problems Brother     No Known Problems Maternal Aunt     No Known Problems Maternal Uncle     No Known Problems Paternal Aunt     No Known Problems Paternal Uncle     No Known Problems Maternal Grandmother     No Known Problems Maternal Grandfather     No Known Problems Paternal Grandmother     No Known Problems Paternal Grandfather        Social History     Occupational History    Not on file   Tobacco Use    Smoking status: Former Smoker     Last attempt to quit: 1976     Years since quittin 7    Smokeless tobacco: Never Used   Substance and Sexual Activity    Alcohol use: No    Drug use: No    Sexual activity: Not on file         Current Outpatient Medications:     aspirin (ASPIRIN LOW DOSE) 81 mg EC tablet, aspirin ec low dose 81 mg tbec, Disp: , Rfl:     atorvastatin (LIPITOR) 40 mg tablet, , Disp: , Rfl:     co-enzyme Q-10 50 MG capsule, Take 50 mg by mouth daily  , Disp: , Rfl:     COMFORT EZ PEN NEEDLES 31G X 6 MM MISC, , Disp: , Rfl:     diclofenac (VOLTAREN) 75 mg EC tablet, Take 1 tablet (75 mg total) by mouth 2 (two) times a day as needed (pain (with food)), Disp: 60 tablet, Rfl: 0    diclofenac sodium (VOLTAREN) 1 %, , Disp: , Rfl:     diltiazem (TIAZAC) 180 MG 24 hr capsule, Take 1 capsule (180 mg total) by mouth daily, Disp: 90 capsule, Rfl: 3    liraglutide (VICTOZA) injection, Inject under the skin daily, Disp: , Rfl:     montelukast (SINGULAIR) 10 mg tablet, montelukast 10 mg tablet, Disp: , Rfl:     omeprazole (PriLOSEC) 40 MG capsule, omeprazole 40 mg cpdr, Disp: , Rfl:     ONE TOUCH ULTRA TEST test strip, USE AS DIRECTED TO test blood sugar TWICE DAILY, Disp: , Rfl: 1    PARoxetine (PAXIL) 10 mg tablet, , Disp: , Rfl:     Travoprost (TRAVATAN OP), Apply to eye daily at bedtime, Disp: , Rfl:     venlafaxine (EFFEXOR-XR) 75 mg 24 hr capsule, Take 75 mg by mouth daily, Disp: , Rfl: 0    No Known Allergies    Objective: There were no vitals filed for this visit      Ortho Exam    Physical Exam    Large joint arthrocentesis: L knee  Date/Time: 3/25/2020 1:01 PM  Consent given by: patient  Site marked: site marked  Supporting Documentation  Indications: pain   Procedure Details  Location: knee - L knee  Preparation: Patient was prepped and draped in the usual sterile fashion (Alcohol prep)  Needle size: 22 G  Ultrasound guidance: no  Approach: anterolateral  Medications administered: 20 mg Sodium Hyaluronate 20 MG/2ML    Patient tolerance: patient tolerated the procedure well with no immediate complications  Dressing:  Sterile dressing applied    Large joint arthrocentesis: R knee  Date/Time: 3/25/2020 1:02 PM  Consent given by: patient  Site marked: site marked  Supporting Documentation  Indications: pain   Procedure Details  Location: knee - R knee  Preparation: Patient was prepped and draped in the usual sterile fashion (Alcohol prep)  Needle size: 22 G  Ultrasound guidance: no  Approach: anterolateral  Medications administered: 20 mg Sodium Hyaluronate 20 MG/2ML    Patient tolerance: patient tolerated the procedure well with no immediate complications  Dressing:  Sterile dressing applied

## 2020-04-01 ENCOUNTER — OFFICE VISIT (OUTPATIENT)
Dept: OBGYN CLINIC | Facility: CLINIC | Age: 80
End: 2020-04-01
Payer: MEDICARE

## 2020-04-01 DIAGNOSIS — M17.11 PRIMARY OSTEOARTHRITIS OF RIGHT KNEE: ICD-10-CM

## 2020-04-01 DIAGNOSIS — M17.12 PRIMARY OSTEOARTHRITIS OF LEFT KNEE: Primary | ICD-10-CM

## 2020-04-01 PROCEDURE — 20610 DRAIN/INJ JOINT/BURSA W/O US: CPT | Performed by: PHYSICIAN ASSISTANT

## 2020-04-01 RX ORDER — HYALURONATE SODIUM 10 MG/ML
20 SYRINGE (ML) INTRAARTICULAR
Status: COMPLETED | OUTPATIENT
Start: 2020-04-01 | End: 2020-04-01

## 2020-04-01 RX ADMIN — Medication 20 MG: at 12:53

## 2020-04-06 ENCOUNTER — TELEMEDICINE (OUTPATIENT)
Dept: PAIN MEDICINE | Facility: CLINIC | Age: 80
End: 2020-04-06
Payer: MEDICARE

## 2020-04-06 DIAGNOSIS — G89.4 CHRONIC PAIN SYNDROME: Primary | ICD-10-CM

## 2020-04-06 DIAGNOSIS — M51.36 LUMBAR DEGENERATIVE DISC DISEASE: ICD-10-CM

## 2020-04-06 DIAGNOSIS — G89.29 CHRONIC BILATERAL LOW BACK PAIN WITH BILATERAL SCIATICA: ICD-10-CM

## 2020-04-06 DIAGNOSIS — M54.41 CHRONIC BILATERAL LOW BACK PAIN WITH BILATERAL SCIATICA: ICD-10-CM

## 2020-04-06 DIAGNOSIS — M54.42 CHRONIC BILATERAL LOW BACK PAIN WITH BILATERAL SCIATICA: ICD-10-CM

## 2020-04-06 DIAGNOSIS — M54.16 LUMBAR RADICULOPATHY: ICD-10-CM

## 2020-04-06 PROCEDURE — 99213 OFFICE O/P EST LOW 20 MIN: CPT | Performed by: ANESTHESIOLOGY

## 2020-04-23 ENCOUNTER — TELEPHONE (OUTPATIENT)
Dept: OBGYN CLINIC | Facility: HOSPITAL | Age: 80
End: 2020-04-23

## 2020-04-27 ENCOUNTER — OFFICE VISIT (OUTPATIENT)
Dept: OBGYN CLINIC | Facility: CLINIC | Age: 80
End: 2020-04-27
Payer: MEDICARE

## 2020-04-27 VITALS — DIASTOLIC BLOOD PRESSURE: 83 MMHG | SYSTOLIC BLOOD PRESSURE: 137 MMHG

## 2020-04-27 DIAGNOSIS — M81.8 OSTEOPOROSIS OF DISUSE: ICD-10-CM

## 2020-04-27 DIAGNOSIS — M17.11 PRIMARY OSTEOARTHRITIS OF RIGHT KNEE: Primary | ICD-10-CM

## 2020-04-27 PROCEDURE — 20610 DRAIN/INJ JOINT/BURSA W/O US: CPT | Performed by: ORTHOPAEDIC SURGERY

## 2020-04-27 PROCEDURE — 99214 OFFICE O/P EST MOD 30 MIN: CPT | Performed by: ORTHOPAEDIC SURGERY

## 2020-04-27 RX ORDER — DEXAMETHASONE SODIUM PHOSPHATE 100 MG/10ML
40 INJECTION INTRAMUSCULAR; INTRAVENOUS
Status: COMPLETED | OUTPATIENT
Start: 2020-04-27 | End: 2020-04-27

## 2020-04-27 RX ORDER — LIDOCAINE HYDROCHLORIDE 10 MG/ML
4 INJECTION, SOLUTION INFILTRATION; PERINEURAL
Status: COMPLETED | OUTPATIENT
Start: 2020-04-27 | End: 2020-04-27

## 2020-04-27 RX ORDER — TRAMADOL HYDROCHLORIDE 50 MG/1
50 TABLET ORAL EVERY 8 HOURS PRN
Qty: 15 TABLET | Refills: 0 | Status: SHIPPED | OUTPATIENT
Start: 2020-04-27 | End: 2020-08-26

## 2020-04-27 RX ADMIN — LIDOCAINE HYDROCHLORIDE 4 ML: 10 INJECTION, SOLUTION INFILTRATION; PERINEURAL at 15:08

## 2020-04-27 RX ADMIN — DEXAMETHASONE SODIUM PHOSPHATE 40 MG: 100 INJECTION INTRAMUSCULAR; INTRAVENOUS at 15:08

## 2020-05-14 ENCOUNTER — TELEPHONE (OUTPATIENT)
Dept: OBGYN CLINIC | Facility: HOSPITAL | Age: 80
End: 2020-05-14

## 2020-05-20 ENCOUNTER — TELEPHONE (OUTPATIENT)
Dept: PAIN MEDICINE | Facility: CLINIC | Age: 80
End: 2020-05-20

## 2020-05-21 DIAGNOSIS — E78.5 DYSLIPIDEMIA: Primary | ICD-10-CM

## 2020-05-21 RX ORDER — ATORVASTATIN CALCIUM 40 MG/1
40 TABLET, FILM COATED ORAL DAILY
Qty: 90 TABLET | Refills: 3 | Status: SHIPPED | OUTPATIENT
Start: 2020-05-21

## 2020-05-21 NOTE — TELEPHONE ENCOUNTER
Returned call to patient she was unaware of any call regarding rescheduling a procedure  Pt had a CELESTINA on 3/12/20  She already has an OV scheduled for 5/22/20

## 2020-05-26 DIAGNOSIS — E78.5 DYSLIPIDEMIA: ICD-10-CM

## 2020-05-26 RX ORDER — ATORVASTATIN CALCIUM 40 MG/1
40 TABLET, FILM COATED ORAL DAILY
Qty: 90 TABLET | Refills: 3 | Status: CANCELLED | OUTPATIENT
Start: 2020-05-26

## 2020-06-22 ENCOUNTER — TELEPHONE (OUTPATIENT)
Dept: PAIN MEDICINE | Facility: MEDICAL CENTER | Age: 80
End: 2020-06-22

## 2020-06-22 NOTE — TELEPHONE ENCOUNTER
Pt is calling checking to see if she can schedule an injection for her back and knee      Pt can be reached at [phone]    Next f/u is scheduled on 7/13

## 2020-07-09 ENCOUNTER — OFFICE VISIT (OUTPATIENT)
Dept: PODIATRY | Facility: CLINIC | Age: 80
End: 2020-07-09
Payer: MEDICARE

## 2020-07-09 VITALS
RESPIRATION RATE: 17 BRPM | WEIGHT: 196 LBS | BODY MASS INDEX: 33.46 KG/M2 | SYSTOLIC BLOOD PRESSURE: 137 MMHG | DIASTOLIC BLOOD PRESSURE: 83 MMHG | HEIGHT: 64 IN | HEART RATE: 99 BPM

## 2020-07-09 DIAGNOSIS — M25.571 ARTHRALGIA OF BOTH ANKLES: ICD-10-CM

## 2020-07-09 DIAGNOSIS — E11.42 DIABETIC POLYNEUROPATHY ASSOCIATED WITH TYPE 2 DIABETES MELLITUS (HCC): Primary | ICD-10-CM

## 2020-07-09 DIAGNOSIS — L57.0 ACTINIC KERATOSIS: ICD-10-CM

## 2020-07-09 DIAGNOSIS — M79.671 PAIN IN BOTH FEET: ICD-10-CM

## 2020-07-09 DIAGNOSIS — M25.572 ARTHRALGIA OF BOTH ANKLES: ICD-10-CM

## 2020-07-09 DIAGNOSIS — M79.672 PAIN IN BOTH FEET: ICD-10-CM

## 2020-07-09 PROCEDURE — 99203 OFFICE O/P NEW LOW 30 MIN: CPT | Performed by: PODIATRIST

## 2020-07-09 RX ORDER — AMMONIUM LACTATE 12 G/100G
CREAM TOPICAL AS NEEDED
Qty: 385 G | Refills: 0 | Status: SHIPPED | OUTPATIENT
Start: 2020-07-09

## 2020-07-09 RX ORDER — GABAPENTIN 100 MG/1
100 CAPSULE ORAL
Qty: 30 CAPSULE | Refills: 0 | Status: SHIPPED | OUTPATIENT
Start: 2020-07-09 | End: 2020-08-20

## 2020-07-13 ENCOUNTER — OFFICE VISIT (OUTPATIENT)
Dept: PAIN MEDICINE | Facility: CLINIC | Age: 80
End: 2020-07-13
Payer: MEDICARE

## 2020-07-13 VITALS
WEIGHT: 198.8 LBS | TEMPERATURE: 98 F | HEIGHT: 64 IN | BODY MASS INDEX: 33.94 KG/M2 | HEART RATE: 73 BPM | SYSTOLIC BLOOD PRESSURE: 128 MMHG | DIASTOLIC BLOOD PRESSURE: 85 MMHG

## 2020-07-13 DIAGNOSIS — G89.4 CHRONIC PAIN SYNDROME: Primary | ICD-10-CM

## 2020-07-13 DIAGNOSIS — M54.16 LUMBAR RADICULOPATHY: ICD-10-CM

## 2020-07-13 DIAGNOSIS — M79.645 CHRONIC PAIN OF LEFT THUMB: ICD-10-CM

## 2020-07-13 DIAGNOSIS — M54.42 CHRONIC BILATERAL LOW BACK PAIN WITH LEFT-SIDED SCIATICA: ICD-10-CM

## 2020-07-13 DIAGNOSIS — G89.29 CHRONIC BILATERAL LOW BACK PAIN WITH LEFT-SIDED SCIATICA: ICD-10-CM

## 2020-07-13 DIAGNOSIS — M48.062 SPINAL STENOSIS OF LUMBAR REGION WITH NEUROGENIC CLAUDICATION: ICD-10-CM

## 2020-07-13 DIAGNOSIS — M51.36 LUMBAR DEGENERATIVE DISC DISEASE: ICD-10-CM

## 2020-07-13 DIAGNOSIS — G89.29 CHRONIC PAIN OF LEFT THUMB: ICD-10-CM

## 2020-07-13 PROBLEM — M51.369 DEGENERATION OF LUMBAR INTERVERTEBRAL DISC: Status: RESOLVED | Noted: 2020-02-24 | Resolved: 2020-07-13

## 2020-07-13 PROCEDURE — 99214 OFFICE O/P EST MOD 30 MIN: CPT | Performed by: ANESTHESIOLOGY

## 2020-07-13 RX ORDER — MELOXICAM 7.5 MG/1
7.5 TABLET ORAL DAILY PRN
Qty: 30 TABLET | Refills: 0 | Status: SHIPPED | OUTPATIENT
Start: 2020-07-13 | End: 2020-08-26

## 2020-07-13 RX ORDER — TIZANIDINE 2 MG/1
2 TABLET ORAL EVERY 8 HOURS PRN
COMMUNITY
Start: 2020-07-02 | End: 2020-11-08

## 2020-07-13 RX ORDER — DILTIAZEM HYDROCHLORIDE 180 MG/1
180 CAPSULE, COATED, EXTENDED RELEASE ORAL DAILY
COMMUNITY
Start: 2020-06-23 | End: 2020-08-26

## 2020-07-13 NOTE — H&P (VIEW-ONLY)
Pain Medicine Follow-Up Note    Assessment:  1  Chronic pain syndrome    2  Chronic bilateral low back pain with left-sided sciatica    3  Spinal stenosis of lumbar region with neurogenic claudication    4  Lumbar degenerative disc disease    5  Lumbar radiculopathy    6  Chronic pain of left thumb        Plan:  Orders Placed This Encounter   Procedures    PAT Covid Screening     Standing Status:   Future     Standing Expiration Date:   7/13/2021    Ambulatory referral to Hand Surgery     Standing Status:   Future     Standing Expiration Date:   7/13/2021     Referral Priority:   Routine     Referral Type:   Consult - AMB     Referral Reason:   Specialty Services Required     Referred to Provider:   Guadalupe Bobo DO     Requested Specialty:   Hand Surgery     Number of Visits Requested:   1     Expiration Date:   7/13/2021       New Medications Ordered This Visit   Medications    tiZANidine (ZANAFLEX) 2 mg tablet     Sig: TAKE 1 TABLET BY MOUTH EVERY DAY AS NEEDED FOR MUSCLE TIGHTNESS    diltiazem (CARDIZEM CD) 180 mg 24 hr capsule     Sig: Take 180 mg by mouth daily    meloxicam (MOBIC) 7 5 mg tablet     Sig: Take 1 tablet (7 5 mg total) by mouth daily as needed (pain)     Dispense:  30 tablet     Refill:  0     My impressions and treatment recommendations were discussed in detail with the patient who verbalized understanding and had no further questions  The patient is reporting pain in her low back with radiation into her left lower extremity  She would like to undergo another lumbar epidural steroid injection, so I felt a reasonable to have her undergo a repeat L4-L5 lumbar epidural steroid injection since she did get excellent pain relief for several months following the previous injection  Complete risks and benefits including bleeding, infection, tissue reaction, nerve injury and allergic reaction were discussed  The approach was demonstrated using models and literature was provided   Verbal and written consent was obtained  The patient also requested a medication to help her with pain at night, so I felt a reasonable to have her take meloxicam 7 5 mg 1 tablet once daily as needed for pain, with food  Side effects were reviewed with the patient  Follow-up is planned in 4 weeks time or sooner as warranted  Discharge instructions were provided  I personally saw and examined the patient and I agree with the above discussed plan of care  History of Present Illness:    Krystian Jo is a 79 y o  female who presents to [facility] for interval re-evaluation of the above stated pain complaints  The patient has a past medical and chronic pain history as outlined in the assessment section  She was last seen on March 12, 2020 at which time she underwent a L4-L5 lumbar epidural steroid injection  At today's office visit, the patient's pain score is 7/10 on the verbal numerical pain rating scale  The patient states that her pain is worse in the morning and constant in nature  She reports the quality of her pain as dull/aching, sharp, cramping, shooting, numbness, and pins and needles  She is reporting excellent pain relief following the previous L4-L5 lumbar epidural steroid injection on March 12, 2020  She would like to undergo a repeat injection at this time since her pain has returned  She is also requesting a pain medication to help her sleep at night  Other than as stated above, the patient denies any interval changes in medications, medical condition, mental condition, symptoms, or allergies since the last office visit  Review of Systems:    Review of Systems   Respiratory: Negative for shortness of breath  Cardiovascular: Negative for chest pain  Gastrointestinal: Positive for constipation  Negative for diarrhea, nausea and vomiting  Musculoskeletal: Positive for arthralgias, back pain and gait problem  Negative for joint swelling and myalgias  Decreased ROM  Joint stiffness   Skin: Negative for rash  Neurological: Negative for dizziness, seizures and weakness  All other systems reviewed and are negative  Patient Active Problem List   Diagnosis    Chronic pain syndrome    Chronic bilateral low back pain with bilateral sciatica    Spinal stenosis of lumbar region with neurogenic claudication    Lumbar radiculopathy    Lumbar degenerative disc disease    Low back pain       Past Medical History:   Diagnosis Date    Diverticulosis     Glaucoma     Low back pain     Lumbar degenerative disc disease 9/16/2019    Peripheral neuropathy     Spinal stenosis        Past Surgical History:   Procedure Laterality Date    COLON SURGERY      resection    COLONOSCOPY N/A 7/11/2016    Procedure: COLONOSCOPY;  Surgeon: Samuel Marc MD;  Location: [place] [address] GI LAB; Service:     COMBINED REDUCTION MAMMAPLASTY W/ LIPOSUCTION      EPIDURAL BLOCK INJECTION N/A 11/15/2019    Procedure: L4 L5 Lumbar Epidural Steroid Injection (27793); Surgeon: Gema Yang MD;  Location: [facility] MAIN OR;  Service: Pain Management     EPIDURAL BLOCK INJECTION N/A 3/12/2020    Procedure: L4 L5 Lumbar Epidural Steroid Injection (32850);   Surgeon: Gema Yang MD;  Location: [facility] MAIN OR;  Service: Pain Management     HERNIA REPAIR      x3       Family History   Problem Relation Age of Onset    Heart disease Mother     Dementia Father     No Known Problems Sister     No Known Problems Brother     No Known Problems Maternal Aunt     No Known Problems Maternal Uncle     No Known Problems Paternal Aunt     No Known Problems Paternal Uncle     No Known Problems Maternal Grandmother     No Known Problems Maternal Grandfather     No Known Problems Paternal Grandmother     No Known Problems Paternal Grandfather        Social History     Occupational History    Not on file   Tobacco Use    Smoking status: Former Smoker     Last attempt to quit: 7/11/1976 Years since quittin 0    Smokeless tobacco: Never Used   Substance and Sexual Activity    Alcohol use: No    Drug use: No    Sexual activity: Not on file         Current Outpatient Medications:     ammonium lactate (LAC-HYDRIN) 12 % cream, Apply topically as needed for dry skin, Disp: 385 g, Rfl: 0    aspirin (ASPIRIN LOW DOSE) 81 mg EC tablet, aspirin ec low dose 81 mg tbec, Disp: , Rfl:     atorvastatin (LIPITOR) 40 mg tablet, Take 1 tablet (40 mg total) by mouth daily, Disp: 90 tablet, Rfl: 3    co-enzyme Q-10 50 MG capsule, Take 50 mg by mouth daily  , Disp: , Rfl:     COMFORT EZ PEN NEEDLES 31G X 6 MM MISC, , Disp: , Rfl:     diclofenac (VOLTAREN) 75 mg EC tablet, Take 1 tablet (75 mg total) by mouth 2 (two) times a day as needed (pain (with food)), Disp: 60 tablet, Rfl: 0    diclofenac sodium (VOLTAREN) 1 %, , Disp: , Rfl:     diclofenac sodium (VOLTAREN) 1 %, Apply 2 g topically 4 (four) times a day for 10 days, Disp: 80 g, Rfl: 0    diltiazem (CARDIZEM CD) 180 mg 24 hr capsule, Take 180 mg by mouth daily, Disp: , Rfl:     diltiazem (TIAZAC) 180 MG 24 hr capsule, Take 1 capsule (180 mg total) by mouth daily, Disp: 90 capsule, Rfl: 3    gabapentin (NEURONTIN) 100 mg capsule, Take 1 capsule (100 mg total) by mouth daily at bedtime, Disp: 30 capsule, Rfl: 0    liraglutide (VICTOZA) injection, Inject under the skin daily, Disp: , Rfl:     montelukast (SINGULAIR) 10 mg tablet, montelukast 10 mg tablet, Disp: , Rfl:     omeprazole (PriLOSEC) 40 MG capsule, omeprazole 40 mg cpdr, Disp: , Rfl:     ONE TOUCH ULTRA TEST test strip, USE AS DIRECTED TO test blood sugar TWICE DAILY, Disp: , Rfl: 1    PARoxetine (PAXIL) 10 mg tablet, , Disp: , Rfl:     tiZANidine (ZANAFLEX) 2 mg tablet, TAKE 1 TABLET BY MOUTH EVERY DAY AS NEEDED FOR MUSCLE TIGHTNESS, Disp: , Rfl:     traMADol (ULTRAM) 50 mg tablet, Take 1 tablet (50 mg total) by mouth every 8 (eight) hours as needed for moderate pain, Disp: 15 tablet, Rfl: 0    Travoprost (TRAVATAN OP), Apply to eye daily at bedtime, Disp: , Rfl:     venlafaxine (EFFEXOR-XR) 75 mg 24 hr capsule, Take 75 mg by mouth daily, Disp: , Rfl: 0    meloxicam (MOBIC) 7 5 mg tablet, Take 1 tablet (7 5 mg total) by mouth daily as needed (pain), Disp: 30 tablet, Rfl: 0    No Known Allergies    Physical Exam:    /85   Pulse 73   Temp 98 °F (36 7 °C)   Ht 5' 4" (1 626 m)   Wt 90 2 kg (198 lb 12 8 oz)   BMI 34 12 kg/m²     Constitutional:obese  Eyes:anicteric  HEENT:grossly intact  Neck:supple, symmetric, trachea midline and no masses   Pulmonary:even and unlabored  Cardiovascular:No edema or pitting edema present  Skin:Normal without rashes or lesions and well hydrated  Psychiatric:Mood and affect appropriate  Neurologic:Cranial Nerves II-XII grossly intact  Musculoskeletal:antalgic    Orders Placed This Encounter   Procedures    PAT Covid Screening    Ambulatory referral to Hand Surgery

## 2020-07-13 NOTE — PROGRESS NOTES
Pain Medicine Follow-Up Note    Assessment:  1  Chronic pain syndrome    2  Chronic bilateral low back pain with left-sided sciatica    3  Spinal stenosis of lumbar region with neurogenic claudication    4  Lumbar degenerative disc disease    5  Lumbar radiculopathy    6  Chronic pain of left thumb        Plan:  Orders Placed This Encounter   Procedures    PAT Covid Screening     Standing Status:   Future     Standing Expiration Date:   7/13/2021    Ambulatory referral to Hand Surgery     Standing Status:   Future     Standing Expiration Date:   7/13/2021     Referral Priority:   Routine     Referral Type:   Consult - AMB     Referral Reason:   Specialty Services Required     Referred to Provider:   Eric Downing DO     Requested Specialty:   Hand Surgery     Number of Visits Requested:   1     Expiration Date:   7/13/2021       New Medications Ordered This Visit   Medications    tiZANidine (ZANAFLEX) 2 mg tablet     Sig: TAKE 1 TABLET BY MOUTH EVERY DAY AS NEEDED FOR MUSCLE TIGHTNESS    diltiazem (CARDIZEM CD) 180 mg 24 hr capsule     Sig: Take 180 mg by mouth daily    meloxicam (MOBIC) 7 5 mg tablet     Sig: Take 1 tablet (7 5 mg total) by mouth daily as needed (pain)     Dispense:  30 tablet     Refill:  0     My impressions and treatment recommendations were discussed in detail with the patient who verbalized understanding and had no further questions  The patient is reporting pain in her low back with radiation into her left lower extremity  She would like to undergo another lumbar epidural steroid injection, so I felt a reasonable to have her undergo a repeat L4-L5 lumbar epidural steroid injection since she did get excellent pain relief for several months following the previous injection  Complete risks and benefits including bleeding, infection, tissue reaction, nerve injury and allergic reaction were discussed  The approach was demonstrated using models and literature was provided   Verbal and written consent was obtained  The patient also requested a medication to help her with pain at night, so I felt a reasonable to have her take meloxicam 7 5 mg 1 tablet once daily as needed for pain, with food  Side effects were reviewed with the patient  Follow-up is planned in 4 weeks time or sooner as warranted  Discharge instructions were provided  I personally saw and examined the patient and I agree with the above discussed plan of care  History of Present Illness:    Virginia Shah is a 79 y o  female who presents to [facility] for interval re-evaluation of the above stated pain complaints  The patient has a past medical and chronic pain history as outlined in the assessment section  She was last seen on March 12, 2020 at which time she underwent a L4-L5 lumbar epidural steroid injection  At today's office visit, the patient's pain score is 7/10 on the verbal numerical pain rating scale  The patient states that her pain is worse in the morning and constant in nature  She reports the quality of her pain as dull/aching, sharp, cramping, shooting, numbness, and pins and needles  She is reporting excellent pain relief following the previous L4-L5 lumbar epidural steroid injection on March 12, 2020  She would like to undergo a repeat injection at this time since her pain has returned  She is also requesting a pain medication to help her sleep at night  Other than as stated above, the patient denies any interval changes in medications, medical condition, mental condition, symptoms, or allergies since the last office visit  Review of Systems:    Review of Systems   Respiratory: Negative for shortness of breath  Cardiovascular: Negative for chest pain  Gastrointestinal: Positive for constipation  Negative for diarrhea, nausea and vomiting  Musculoskeletal: Positive for arthralgias, back pain and gait problem  Negative for joint swelling and myalgias  Decreased ROM  Joint stiffness   Skin: Negative for rash  Neurological: Negative for dizziness, seizures and weakness  All other systems reviewed and are negative  Patient Active Problem List   Diagnosis    Chronic pain syndrome    Chronic bilateral low back pain with bilateral sciatica    Spinal stenosis of lumbar region with neurogenic claudication    Lumbar radiculopathy    Lumbar degenerative disc disease    Low back pain       Past Medical History:   Diagnosis Date    Diverticulosis     Glaucoma     Low back pain     Lumbar degenerative disc disease 9/16/2019    Peripheral neuropathy     Spinal stenosis        Past Surgical History:   Procedure Laterality Date    COLON SURGERY      resection    COLONOSCOPY N/A 7/11/2016    Procedure: COLONOSCOPY;  Surgeon: Philomena England MD;  Location: [place] [address] GI LAB; Service:     COMBINED REDUCTION MAMMAPLASTY W/ LIPOSUCTION      EPIDURAL BLOCK INJECTION N/A 11/15/2019    Procedure: L4 L5 Lumbar Epidural Steroid Injection (68519); Surgeon: Isidro Hammond MD;  Location: [facility] MAIN OR;  Service: Pain Management     EPIDURAL BLOCK INJECTION N/A 3/12/2020    Procedure: L4 L5 Lumbar Epidural Steroid Injection (82733);   Surgeon: Isidro Hammond MD;  Location: [facility] MAIN OR;  Service: Pain Management     HERNIA REPAIR      x3       Family History   Problem Relation Age of Onset    Heart disease Mother     Dementia Father     No Known Problems Sister     No Known Problems Brother     No Known Problems Maternal Aunt     No Known Problems Maternal Uncle     No Known Problems Paternal Aunt     No Known Problems Paternal Uncle     No Known Problems Maternal Grandmother     No Known Problems Maternal Grandfather     No Known Problems Paternal Grandmother     No Known Problems Paternal Grandfather        Social History     Occupational History    Not on file   Tobacco Use    Smoking status: Former Smoker     Last attempt to quit: 7/11/1976 Years since quittin 0    Smokeless tobacco: Never Used   Substance and Sexual Activity    Alcohol use: No    Drug use: No    Sexual activity: Not on file         Current Outpatient Medications:     ammonium lactate (LAC-HYDRIN) 12 % cream, Apply topically as needed for dry skin, Disp: 385 g, Rfl: 0    aspirin (ASPIRIN LOW DOSE) 81 mg EC tablet, aspirin ec low dose 81 mg tbec, Disp: , Rfl:     atorvastatin (LIPITOR) 40 mg tablet, Take 1 tablet (40 mg total) by mouth daily, Disp: 90 tablet, Rfl: 3    co-enzyme Q-10 50 MG capsule, Take 50 mg by mouth daily  , Disp: , Rfl:     COMFORT EZ PEN NEEDLES 31G X 6 MM MISC, , Disp: , Rfl:     diclofenac (VOLTAREN) 75 mg EC tablet, Take 1 tablet (75 mg total) by mouth 2 (two) times a day as needed (pain (with food)), Disp: 60 tablet, Rfl: 0    diclofenac sodium (VOLTAREN) 1 %, , Disp: , Rfl:     diclofenac sodium (VOLTAREN) 1 %, Apply 2 g topically 4 (four) times a day for 10 days, Disp: 80 g, Rfl: 0    diltiazem (CARDIZEM CD) 180 mg 24 hr capsule, Take 180 mg by mouth daily, Disp: , Rfl:     diltiazem (TIAZAC) 180 MG 24 hr capsule, Take 1 capsule (180 mg total) by mouth daily, Disp: 90 capsule, Rfl: 3    gabapentin (NEURONTIN) 100 mg capsule, Take 1 capsule (100 mg total) by mouth daily at bedtime, Disp: 30 capsule, Rfl: 0    liraglutide (VICTOZA) injection, Inject under the skin daily, Disp: , Rfl:     montelukast (SINGULAIR) 10 mg tablet, montelukast 10 mg tablet, Disp: , Rfl:     omeprazole (PriLOSEC) 40 MG capsule, omeprazole 40 mg cpdr, Disp: , Rfl:     ONE TOUCH ULTRA TEST test strip, USE AS DIRECTED TO test blood sugar TWICE DAILY, Disp: , Rfl: 1    PARoxetine (PAXIL) 10 mg tablet, , Disp: , Rfl:     tiZANidine (ZANAFLEX) 2 mg tablet, TAKE 1 TABLET BY MOUTH EVERY DAY AS NEEDED FOR MUSCLE TIGHTNESS, Disp: , Rfl:     traMADol (ULTRAM) 50 mg tablet, Take 1 tablet (50 mg total) by mouth every 8 (eight) hours as needed for moderate pain, Disp: 15 tablet, Rfl: 0    Travoprost (TRAVATAN OP), Apply to eye daily at bedtime, Disp: , Rfl:     venlafaxine (EFFEXOR-XR) 75 mg 24 hr capsule, Take 75 mg by mouth daily, Disp: , Rfl: 0    meloxicam (MOBIC) 7 5 mg tablet, Take 1 tablet (7 5 mg total) by mouth daily as needed (pain), Disp: 30 tablet, Rfl: 0    No Known Allergies    Physical Exam:    /85   Pulse 73   Temp 98 °F (36 7 °C)   Ht 5' 4" (1 626 m)   Wt 90 2 kg (198 lb 12 8 oz)   BMI 34 12 kg/m²     Constitutional:obese  Eyes:anicteric  HEENT:grossly intact  Neck:supple, symmetric, trachea midline and no masses   Pulmonary:even and unlabored  Cardiovascular:No edema or pitting edema present  Skin:Normal without rashes or lesions and well hydrated  Psychiatric:Mood and affect appropriate  Neurologic:Cranial Nerves II-XII grossly intact  Musculoskeletal:antalgic    Orders Placed This Encounter   Procedures    PAT Covid Screening    Ambulatory referral to Hand Surgery

## 2020-07-15 ENCOUNTER — OFFICE VISIT (OUTPATIENT)
Dept: OBGYN CLINIC | Facility: CLINIC | Age: 80
End: 2020-07-15
Payer: MEDICARE

## 2020-07-15 DIAGNOSIS — M17.12 PRIMARY OSTEOARTHRITIS OF LEFT KNEE: ICD-10-CM

## 2020-07-15 DIAGNOSIS — M17.11 PRIMARY OSTEOARTHRITIS OF RIGHT KNEE: Primary | ICD-10-CM

## 2020-07-15 PROCEDURE — 99213 OFFICE O/P EST LOW 20 MIN: CPT | Performed by: ORTHOPAEDIC SURGERY

## 2020-07-15 PROCEDURE — 99214 OFFICE O/P EST MOD 30 MIN: CPT | Performed by: ORTHOPAEDIC SURGERY

## 2020-07-15 PROCEDURE — 20610 DRAIN/INJ JOINT/BURSA W/O US: CPT | Performed by: ORTHOPAEDIC SURGERY

## 2020-07-15 RX ORDER — HYALURONATE SODIUM 10 MG/ML
20 SYRINGE (ML) INTRAARTICULAR
Status: COMPLETED | OUTPATIENT
Start: 2020-07-15 | End: 2020-07-15

## 2020-07-15 RX ADMIN — Medication 20 MG: at 13:39

## 2020-07-15 NOTE — PROGRESS NOTES
Assessment/Plan:  1  Primary osteoarthritis of right knee     2  Primary osteoarthritis of left knee       As for the right knee, the patient does have significant arthritis and has failed conservative treatment thus far  We are referring her to our joint replacement specialist Dr Charles Roach for consultation regarding total knee replacement  As for the left knee, we did give her euflexxa injection #1 today  She will schedule her next 2 injections of this series  Subjective:   Cristina Huff is a 79 y o  female who presents today for follow-up of her bilateral knees  She did have euflexxa injections for the right knee back in march/april, followed by a steroid injection  There have not helped her significantly  She notes ongoing pain about the right knee, which is worse with activity and better with rest  Meloxicam does help some as well  She also notes left knee pain, though this is not as severe as the right  She is inquiring about joint lubricant injections for the left knee  She does have known arthritis of both knees, worse on the right than the left  Review of Systems   Constitutional: Negative  Negative for chills and fever  HENT: Negative  Negative for ear pain and sore throat  Eyes: Negative  Negative for pain and redness  Respiratory: Negative  Negative for shortness of breath and wheezing  Cardiovascular: Negative for chest pain and palpitations  Gastrointestinal: Negative  Negative for abdominal pain and blood in stool  Endocrine: Negative  Negative for polydipsia and polyuria  Genitourinary: Negative  Negative for difficulty urinating and dysuria  Musculoskeletal:        As noted in HPI   Skin: Negative  Negative for pallor and rash  Neurological: Negative  Negative for dizziness and numbness  Hematological: Negative  Negative for adenopathy  Does not bruise/bleed easily  Psychiatric/Behavioral: Negative  Negative for confusion and suicidal ideas  Past Medical History:   Diagnosis Date    Diverticulosis     Glaucoma     Low back pain     Lumbar degenerative disc disease 2019    Peripheral neuropathy     Spinal stenosis        Past Surgical History:   Procedure Laterality Date    COLON SURGERY      resection    COLONOSCOPY N/A 2016    Procedure: COLONOSCOPY;  Surgeon: Laron Samano MD;  Location: [place] [address] GI LAB; Service:     COMBINED REDUCTION MAMMAPLASTY W/ LIPOSUCTION      EPIDURAL BLOCK INJECTION N/A 11/15/2019    Procedure: L4 L5 Lumbar Epidural Steroid Injection (18736); Surgeon: Tierra Beard MD;  Location: [facility] MAIN OR;  Service: Pain Management     EPIDURAL BLOCK INJECTION N/A 3/12/2020    Procedure: L4 L5 Lumbar Epidural Steroid Injection (51652);   Surgeon: Tierra Beard MD;  Location: [facility] MAIN OR;  Service: Pain Management     HERNIA REPAIR      x3       Family History   Problem Relation Age of Onset    Heart disease Mother     Dementia Father     No Known Problems Sister     No Known Problems Brother     No Known Problems Maternal Aunt     No Known Problems Maternal Uncle     No Known Problems Paternal Aunt     No Known Problems Paternal Uncle     No Known Problems Maternal Grandmother     No Known Problems Maternal Grandfather     No Known Problems Paternal Grandmother     No Known Problems Paternal Grandfather        Social History     Occupational History    Not on file   Tobacco Use    Smoking status: Former Smoker     Last attempt to quit: 1976     Years since quittin 0    Smokeless tobacco: Never Used   Substance and Sexual Activity    Alcohol use: No    Drug use: No    Sexual activity: Not on file         Current Outpatient Medications:     ammonium lactate (LAC-HYDRIN) 12 % cream, Apply topically as needed for dry skin, Disp: 385 g, Rfl: 0    aspirin (ASPIRIN LOW DOSE) 81 mg EC tablet, aspirin ec low dose 81 mg tbec, Disp: , Rfl:     atorvastatin (LIPITOR) 40 mg tablet, Take 1 tablet (40 mg total) by mouth daily, Disp: 90 tablet, Rfl: 3    co-enzyme Q-10 50 MG capsule, Take 50 mg by mouth daily  , Disp: , Rfl:     COMFORT EZ PEN NEEDLES 31G X 6 MM MISC, , Disp: , Rfl:     diclofenac (VOLTAREN) 75 mg EC tablet, Take 1 tablet (75 mg total) by mouth 2 (two) times a day as needed (pain (with food)), Disp: 60 tablet, Rfl: 0    diclofenac sodium (VOLTAREN) 1 %, , Disp: , Rfl:     diclofenac sodium (VOLTAREN) 1 %, Apply 2 g topically 4 (four) times a day for 10 days, Disp: 80 g, Rfl: 0    diltiazem (CARDIZEM CD) 180 mg 24 hr capsule, Take 180 mg by mouth daily, Disp: , Rfl:     diltiazem (TIAZAC) 180 MG 24 hr capsule, Take 1 capsule (180 mg total) by mouth daily, Disp: 90 capsule, Rfl: 3    gabapentin (NEURONTIN) 100 mg capsule, Take 1 capsule (100 mg total) by mouth daily at bedtime, Disp: 30 capsule, Rfl: 0    liraglutide (VICTOZA) injection, Inject under the skin daily, Disp: , Rfl:     meloxicam (MOBIC) 7 5 mg tablet, Take 1 tablet (7 5 mg total) by mouth daily as needed (pain), Disp: 30 tablet, Rfl: 0    montelukast (SINGULAIR) 10 mg tablet, montelukast 10 mg tablet, Disp: , Rfl:     omeprazole (PriLOSEC) 40 MG capsule, omeprazole 40 mg cpdr, Disp: , Rfl:     ONE TOUCH ULTRA TEST test strip, USE AS DIRECTED TO test blood sugar TWICE DAILY, Disp: , Rfl: 1    PARoxetine (PAXIL) 10 mg tablet, , Disp: , Rfl:     tiZANidine (ZANAFLEX) 2 mg tablet, TAKE 1 TABLET BY MOUTH EVERY DAY AS NEEDED FOR MUSCLE TIGHTNESS, Disp: , Rfl:     traMADol (ULTRAM) 50 mg tablet, Take 1 tablet (50 mg total) by mouth every 8 (eight) hours as needed for moderate pain, Disp: 15 tablet, Rfl: 0    Travoprost (TRAVATAN OP), Apply to eye daily at bedtime, Disp: , Rfl:     venlafaxine (EFFEXOR-XR) 75 mg 24 hr capsule, Take 75 mg by mouth daily, Disp: , Rfl: 0    No Known Allergies    Objective: There were no vitals filed for this visit      Right Knee Exam     Tenderness   The patient is experiencing tenderness in the medial joint line  Range of Motion   Extension: 0   Flexion: 90     Tests   Varus: negative Valgus: negative  Drawer:  Anterior - negative    Posterior - negative    Other   Erythema: absent  Scars: absent  Sensation: normal  Pulse: present  Swelling: none  Effusion: no effusion present      Left Knee Exam     Tenderness   The patient is experiencing tenderness in the medial joint line  Range of Motion   Extension: 0   Flexion: 120     Tests   Varus: negative Valgus: negative  Drawer:  Anterior - negative     Posterior - negative    Other   Erythema: absent  Scars: absent  Sensation: normal  Pulse: present  Swelling: none  Effusion: no effusion present          Observations   Left Knee   Negative for effusion  Right Knee   Negative for effusion  Physical Exam   Constitutional: She is oriented to person, place, and time  She appears well-developed and well-nourished  HENT:   Head: Normocephalic and atraumatic  Eyes: EOM are normal    Neck: Normal range of motion  Neck supple  Cardiovascular: Regular rhythm  Pulmonary/Chest: Effort normal  No respiratory distress  Musculoskeletal:        Right knee: She exhibits no effusion  Left knee: She exhibits no effusion  Neurological: She is alert and oriented to person, place, and time  Skin: No rash noted  Psychiatric: She has a normal mood and affect  Her behavior is normal    Nursing note and vitals reviewed        Large joint arthrocentesis: L knee  Date/Time: 7/15/2020 1:39 PM  Consent given by: patient  Site marked: site marked  Supporting Documentation  Indications: pain   Procedure Details  Location: knee - L knee  Preparation: Patient was prepped and draped in the usual sterile fashion (Alcohol prep)  Needle size: 22 G  Ultrasound guidance: no  Approach: anterolateral  Medications administered: 20 mg Sodium Hyaluronate 20 MG/2ML    Patient tolerance: patient tolerated the procedure well with no immediate complications  Dressing:  Sterile dressing applied

## 2020-07-17 DIAGNOSIS — M48.062 SPINAL STENOSIS OF LUMBAR REGION WITH NEUROGENIC CLAUDICATION: ICD-10-CM

## 2020-07-17 DIAGNOSIS — M54.42 CHRONIC BILATERAL LOW BACK PAIN WITH LEFT-SIDED SCIATICA: ICD-10-CM

## 2020-07-17 DIAGNOSIS — G89.29 CHRONIC BILATERAL LOW BACK PAIN WITH LEFT-SIDED SCIATICA: ICD-10-CM

## 2020-07-17 DIAGNOSIS — G89.4 CHRONIC PAIN SYNDROME: ICD-10-CM

## 2020-07-17 DIAGNOSIS — M51.36 LUMBAR DEGENERATIVE DISC DISEASE: ICD-10-CM

## 2020-07-17 DIAGNOSIS — M54.16 LUMBAR RADICULOPATHY: ICD-10-CM

## 2020-07-17 PROCEDURE — U0003 INFECTIOUS AGENT DETECTION BY NUCLEIC ACID (DNA OR RNA); SEVERE ACUTE RESPIRATORY SYNDROME CORONAVIRUS 2 (SARS-COV-2) (CORONAVIRUS DISEASE [COVID-19]), AMPLIFIED PROBE TECHNIQUE, MAKING USE OF HIGH THROUGHPUT TECHNOLOGIES AS DESCRIBED BY CMS-2020-01-R: HCPCS

## 2020-07-18 LAB
INPATIENT: NORMAL
SARS-COV-2 RNA SPEC QL NAA+PROBE: NOT DETECTED

## 2020-07-20 ENCOUNTER — APPOINTMENT (OUTPATIENT)
Dept: RADIOLOGY | Facility: CLINIC | Age: 80
End: 2020-07-20
Payer: MEDICARE

## 2020-07-20 ENCOUNTER — CONSULT (OUTPATIENT)
Dept: OBGYN CLINIC | Facility: CLINIC | Age: 80
End: 2020-07-20
Payer: MEDICARE

## 2020-07-20 VITALS
HEIGHT: 64 IN | WEIGHT: 198 LBS | SYSTOLIC BLOOD PRESSURE: 118 MMHG | HEART RATE: 85 BPM | BODY MASS INDEX: 33.8 KG/M2 | TEMPERATURE: 98.3 F | DIASTOLIC BLOOD PRESSURE: 75 MMHG

## 2020-07-20 DIAGNOSIS — M18.12 ARTHRITIS OF CARPOMETACARPAL (CMC) JOINT OF LEFT THUMB: Primary | ICD-10-CM

## 2020-07-20 DIAGNOSIS — G56.02 CARPAL TUNNEL SYNDROME ON LEFT: ICD-10-CM

## 2020-07-20 DIAGNOSIS — G89.29 CHRONIC PAIN OF LEFT THUMB: ICD-10-CM

## 2020-07-20 DIAGNOSIS — M79.645 CHRONIC PAIN OF LEFT THUMB: ICD-10-CM

## 2020-07-20 PROCEDURE — 73140 X-RAY EXAM OF FINGER(S): CPT

## 2020-07-20 PROCEDURE — 99213 OFFICE O/P EST LOW 20 MIN: CPT | Performed by: ORTHOPAEDIC SURGERY

## 2020-07-20 NOTE — PROGRESS NOTES
Assessment/Plan:  1  Arthritis of carpometacarpal (CMC) joint of left thumb     2  Carpal tunnel syndrome on left  EMG 1 Limb   3  Chronic pain of left thumb  Ambulatory referral to Hand Surgery    XR thumb left first digit-min 2v       Scribe Attestation    I,:   Daniela Chang MA am acting as a scribe while in the presence of the attending physician :        I,:   Nisha Miranda DO personally performed the services described in this documentation    as scribed in my presence :              I discussed with Chelsey William that her signs and symptoms are consistent with left thumb CMC arthritis and left carpal tunnel  She is tender to palpation over the thumb CMC  She does have a positive tinel's and ace's on exam  An EMG was ordered today to evaluate for carpal tunnel  Patient does have an upcoming procedure with Dr Gregg Lovett do to this, we will hold off on steroid injections today  She was fitted and provided with a comfort cool brace that she may use as needed  She deferred a cock-up wrist brace at this time  Patient will follow up 2 weeks after her procedure for a left carpal tunnel and left thumb CMC injection  Subjective:   Roque Mendoza is a 79 y o  female who presents to the office today as a referral from our pain management doctor Dr Gregg Lovett for evaluation of left thumb pain  Patient notes pain to the base of her thumb  She states this has been ongoing for the past 20 years  She notes increased pain with pinch and   She also notes numbness and tingling  She has been taking NSAIDS OTC as needed for pain  Patient states she did have an EMG performed 15-20 years ago  Review of Systems   Constitutional: Negative for chills and fever  HENT: Negative for drooling and sneezing  Eyes: Negative for redness  Respiratory: Negative for cough and wheezing  Gastrointestinal: Negative for nausea and vomiting  Musculoskeletal: Negative for arthralgias, joint swelling and myalgias  Neurological: Negative for weakness and numbness  Psychiatric/Behavioral: Negative for behavioral problems  The patient is not nervous/anxious  Past Medical History:   Diagnosis Date    Diverticulosis     Glaucoma     Low back pain     Lumbar degenerative disc disease 2019    Peripheral neuropathy     Spinal stenosis        Past Surgical History:   Procedure Laterality Date    COLON SURGERY      resection    COLONOSCOPY N/A 2016    Procedure: COLONOSCOPY;  Surgeon: Adiel Joseph MD;  [facility] GI LAB; Service:     COMBINED REDUCTION MAMMAPLASTY W/ LIPOSUCTION      EPIDURAL BLOCK INJECTION N/A 11/15/2019    Procedure: L4 L5 Lumbar Epidural Steroid Injection (16879); Surgeon: Abdi Fontaine MD;  Location: [facility] MAIN OR;  Service: Pain Management     EPIDURAL BLOCK INJECTION N/A 3/12/2020    Procedure: L4 L5 Lumbar Epidural Steroid Injection (28884);   Surgeon: Abdi Fontaine MD;  Location: [facility] MAIN OR;  Service: Pain Management     HERNIA REPAIR      x3       Family History   Problem Relation Age of Onset    Heart disease Mother     Dementia Father     No Known Problems Sister     No Known Problems Brother     No Known Problems Maternal Aunt     No Known Problems Maternal Uncle     No Known Problems Paternal Aunt     No Known Problems Paternal Uncle     No Known Problems Maternal Grandmother     No Known Problems Maternal Grandfather     No Known Problems Paternal Grandmother     No Known Problems Paternal Grandfather        Social History     Occupational History    Not on file   Tobacco Use    Smoking status: Former Smoker     Last attempt to quit: 1976     Years since quittin 0    Smokeless tobacco: Never Used   Substance and Sexual Activity    Alcohol use: No    Drug use: No    Sexual activity: Not on file         Current Outpatient Medications:     ammonium lactate (LAC-HYDRIN) 12 % cream, Apply topically as needed for dry skin, Disp: 385 g, Rfl: 0    aspirin (ASPIRIN LOW DOSE) 81 mg EC tablet, aspirin ec low dose 81 mg tbec, Disp: , Rfl:     atorvastatin (LIPITOR) 40 mg tablet, Take 1 tablet (40 mg total) by mouth daily, Disp: 90 tablet, Rfl: 3    co-enzyme Q-10 50 MG capsule, Take 50 mg by mouth daily  , Disp: , Rfl:     COMFORT EZ PEN NEEDLES 31G X 6 MM MISC, , Disp: , Rfl:     diclofenac (VOLTAREN) 75 mg EC tablet, Take 1 tablet (75 mg total) by mouth 2 (two) times a day as needed (pain (with food)), Disp: 60 tablet, Rfl: 0    diclofenac sodium (VOLTAREN) 1 %, , Disp: , Rfl:     diltiazem (CARDIZEM CD) 180 mg 24 hr capsule, Take 180 mg by mouth daily, Disp: , Rfl:     diltiazem (TIAZAC) 180 MG 24 hr capsule, Take 1 capsule (180 mg total) by mouth daily, Disp: 90 capsule, Rfl: 3    gabapentin (NEURONTIN) 100 mg capsule, Take 1 capsule (100 mg total) by mouth daily at bedtime, Disp: 30 capsule, Rfl: 0    liraglutide (VICTOZA) injection, Inject under the skin daily, Disp: , Rfl:     meloxicam (MOBIC) 7 5 mg tablet, Take 1 tablet (7 5 mg total) by mouth daily as needed (pain), Disp: 30 tablet, Rfl: 0    montelukast (SINGULAIR) 10 mg tablet, montelukast 10 mg tablet, Disp: , Rfl:     omeprazole (PriLOSEC) 40 MG capsule, omeprazole 40 mg cpdr, Disp: , Rfl:     ONE TOUCH ULTRA TEST test strip, USE AS DIRECTED TO test blood sugar TWICE DAILY, Disp: , Rfl: 1    PARoxetine (PAXIL) 10 mg tablet, , Disp: , Rfl:     tiZANidine (ZANAFLEX) 2 mg tablet, TAKE 1 TABLET BY MOUTH EVERY DAY AS NEEDED FOR MUSCLE TIGHTNESS, Disp: , Rfl:     traMADol (ULTRAM) 50 mg tablet, Take 1 tablet (50 mg total) by mouth every 8 (eight) hours as needed for moderate pain, Disp: 15 tablet, Rfl: 0    Travoprost (TRAVATAN OP), Apply to eye daily at bedtime, Disp: , Rfl:     venlafaxine (EFFEXOR-XR) 75 mg 24 hr capsule, Take 75 mg by mouth daily, Disp: , Rfl: 0    diclofenac sodium (VOLTAREN) 1 %, Apply 2 g topically 4 (four) times a day for 10 days, Disp: 80 g, Rfl: 0    No Known Allergies    Objective:  Vitals:    07/20/20 1451   BP: 118/75   Pulse: 85   Temp: 98 3 °F (36 8 °C)       Ortho Exam     Left thumb    TTP thumb CMC  + grind  + tinel's  + ace's   Compartments soft  Brisk capillary refill  S/m intact median, radial, and ulnar nerve     Physical Exam   Constitutional: She is oriented to person, place, and time  She appears well-developed and well-nourished  HENT:   Head: Normocephalic and atraumatic  Eyes: Conjunctivae are normal  Right eye exhibits no discharge  Left eye exhibits no discharge  Neck: Normal range of motion  Neck supple  Cardiovascular: Normal rate and intact distal pulses  Pulmonary/Chest: Effort normal  No respiratory distress  Musculoskeletal:   As noted in HPI   Neurological: She is alert and oriented to person, place, and time  Skin: Skin is warm and dry  Psychiatric: She has a normal mood and affect  Her behavior is normal  Judgment and thought content normal        I have personally reviewed pertinent films in PACS and my interpretation is as follows:X-ray left thumb performed in the office today demonstrates thumb CMC arthritis

## 2020-07-22 ENCOUNTER — OFFICE VISIT (OUTPATIENT)
Dept: OBGYN CLINIC | Facility: CLINIC | Age: 80
End: 2020-07-22
Payer: MEDICARE

## 2020-07-22 DIAGNOSIS — M17.12 PRIMARY OSTEOARTHRITIS OF LEFT KNEE: Primary | ICD-10-CM

## 2020-07-22 PROCEDURE — 20610 DRAIN/INJ JOINT/BURSA W/O US: CPT | Performed by: ORTHOPAEDIC SURGERY

## 2020-07-22 RX ORDER — HYALURONATE SODIUM 10 MG/ML
20 SYRINGE (ML) INTRAARTICULAR
Status: COMPLETED | OUTPATIENT
Start: 2020-07-22 | End: 2020-07-22

## 2020-07-22 RX ADMIN — Medication 20 MG: at 14:13

## 2020-07-22 NOTE — PROGRESS NOTES
Assessment/Plan:  1  Primary osteoarthritis of left knee         Follow-up 1 week     Subjective:   Kelly Mcarthur is a 79 y o  female who presents today for euflexxa #2 left knee  Review of Systems      Past Medical History:   Diagnosis Date    Diverticulosis     Glaucoma     Low back pain     Lumbar degenerative disc disease 2019    Peripheral neuropathy     Spinal stenosis        Past Surgical History:   Procedure Laterality Date    COLON SURGERY      resection    COLONOSCOPY N/A 2016    Procedure: COLONOSCOPY;  Surgeon: Rita Aguero MD;  Location: [facility]; Service:     COMBINED REDUCTION MAMMAPLASTY W/ LIPOSUCTION      EPIDURAL BLOCK INJECTION N/A 11/15/2019    Procedure: L4 L5 Lumbar Epidural Steroid Injection (13676); Surgeon: Carmenza Arriaga MD;  Location: [facility] MAIN OR;  Service: Pain Management     EPIDURAL BLOCK INJECTION N/A 3/12/2020    Procedure: L4 L5 Lumbar Epidural Steroid Injection (97852);   Surgeon: Carmenza Arriaga MD;  Location: [facility] MAIN OR;  Service: Pain Management     HERNIA REPAIR      x3       Family History   Problem Relation Age of Onset    Heart disease Mother     Dementia Father     No Known Problems Sister     No Known Problems Brother     No Known Problems Maternal Aunt     No Known Problems Maternal Uncle     No Known Problems Paternal Aunt     No Known Problems Paternal Uncle     No Known Problems Maternal Grandmother     No Known Problems Maternal Grandfather     No Known Problems Paternal Grandmother     No Known Problems Paternal Grandfather        Social History     Occupational History    Not on file   Tobacco Use    Smoking status: Former Smoker     Last attempt to quit: 1976     Years since quittin 0    Smokeless tobacco: Never Used   Substance and Sexual Activity    Alcohol use: No    Drug use: No    Sexual activity: Not on file         Current Outpatient Medications:     ammonium lactate (LAC-HYDRIN) 12 % cream, Apply topically as needed for dry skin, Disp: 385 g, Rfl: 0    aspirin (ASPIRIN LOW DOSE) 81 mg EC tablet, aspirin ec low dose 81 mg tbec, Disp: , Rfl:     atorvastatin (LIPITOR) 40 mg tablet, Take 1 tablet (40 mg total) by mouth daily, Disp: 90 tablet, Rfl: 3    co-enzyme Q-10 50 MG capsule, Take 50 mg by mouth daily  , Disp: , Rfl:     COMFORT EZ PEN NEEDLES 31G X 6 MM MISC, , Disp: , Rfl:     diclofenac (VOLTAREN) 75 mg EC tablet, Take 1 tablet (75 mg total) by mouth 2 (two) times a day as needed (pain (with food)), Disp: 60 tablet, Rfl: 0    diclofenac sodium (VOLTAREN) 1 %, , Disp: , Rfl:     diclofenac sodium (VOLTAREN) 1 %, Apply 2 g topically 4 (four) times a day for 10 days, Disp: 80 g, Rfl: 0    diltiazem (CARDIZEM CD) 180 mg 24 hr capsule, Take 180 mg by mouth daily, Disp: , Rfl:     diltiazem (TIAZAC) 180 MG 24 hr capsule, Take 1 capsule (180 mg total) by mouth daily, Disp: 90 capsule, Rfl: 3    gabapentin (NEURONTIN) 100 mg capsule, Take 1 capsule (100 mg total) by mouth daily at bedtime, Disp: 30 capsule, Rfl: 0    liraglutide (VICTOZA) injection, Inject under the skin daily, Disp: , Rfl:     meloxicam (MOBIC) 7 5 mg tablet, Take 1 tablet (7 5 mg total) by mouth daily as needed (pain), Disp: 30 tablet, Rfl: 0    montelukast (SINGULAIR) 10 mg tablet, montelukast 10 mg tablet, Disp: , Rfl:     omeprazole (PriLOSEC) 40 MG capsule, omeprazole 40 mg cpdr, Disp: , Rfl:     ONE TOUCH ULTRA TEST test strip, USE AS DIRECTED TO test blood sugar TWICE DAILY, Disp: , Rfl: 1    PARoxetine (PAXIL) 10 mg tablet, , Disp: , Rfl:     tiZANidine (ZANAFLEX) 2 mg tablet, TAKE 1 TABLET BY MOUTH EVERY DAY AS NEEDED FOR MUSCLE TIGHTNESS, Disp: , Rfl:     traMADol (ULTRAM) 50 mg tablet, Take 1 tablet (50 mg total) by mouth every 8 (eight) hours as needed for moderate pain, Disp: 15 tablet, Rfl: 0    Travoprost (TRAVATAN OP), Apply to eye daily at bedtime, Disp: , Rfl:     venlafaxine (EFFEXOR-XR) 75 mg 24 hr capsule, Take 75 mg by mouth daily, Disp: , Rfl: 0    No Known Allergies    Objective: There were no vitals filed for this visit      Ortho Exam    Physical Exam    Large joint arthrocentesis: L knee  Date/Time: 7/22/2020 2:13 PM  Consent given by: patient  Site marked: site marked  Supporting Documentation  Indications: pain   Procedure Details  Location: knee - L knee  Preparation: Patient was prepped and draped in the usual sterile fashion (Alcohol prep)  Needle size: 22 G  Ultrasound guidance: no  Approach: anterolateral  Medications administered: 20 mg Sodium Hyaluronate 20 MG/2ML    Patient tolerance: patient tolerated the procedure well with no immediate complications  Dressing:  Sterile dressing applied

## 2020-07-23 ENCOUNTER — APPOINTMENT (OUTPATIENT)
Dept: RADIOLOGY | Facility: HOSPITAL | Age: 80
End: 2020-07-23
Payer: MEDICARE

## 2020-07-23 ENCOUNTER — HOSPITAL ENCOUNTER (OUTPATIENT)
Facility: AMBULARY SURGERY CENTER | Age: 80
Setting detail: OUTPATIENT SURGERY
Discharge: HOME | End: 2020-07-23
Attending: ANESTHESIOLOGY | Admitting: ANESTHESIOLOGY
Payer: MEDICARE

## 2020-07-23 VITALS
OXYGEN SATURATION: 95 % | DIASTOLIC BLOOD PRESSURE: 74 MMHG | RESPIRATION RATE: 18 BRPM | TEMPERATURE: 97.8 F | HEART RATE: 72 BPM | SYSTOLIC BLOOD PRESSURE: 144 MMHG

## 2020-07-23 PROCEDURE — 62323 NJX INTERLAMINAR LMBR/SAC: CPT | Performed by: ANESTHESIOLOGY

## 2020-07-23 PROCEDURE — NC001 PR NO CHARGE: Performed by: ANESTHESIOLOGY

## 2020-07-23 PROCEDURE — 72020 X-RAY EXAM OF SPINE 1 VIEW: CPT

## 2020-07-23 RX ORDER — METHYLPREDNISOLONE ACETATE 80 MG/ML
INJECTION, SUSPENSION INTRA-ARTICULAR; INTRALESIONAL; INTRAMUSCULAR; SOFT TISSUE AS NEEDED
Status: DISCONTINUED | OUTPATIENT
Start: 2020-07-23 | End: 2020-07-23 | Stop reason: HOSPADM

## 2020-07-23 RX ORDER — LIDOCAINE WITH 8.4% SOD BICARB 0.9%(10ML)
SYRINGE (ML) INJECTION AS NEEDED
Status: DISCONTINUED | OUTPATIENT
Start: 2020-07-23 | End: 2020-07-23 | Stop reason: HOSPADM

## 2020-07-23 NOTE — DISCHARGE INSTRUCTIONS
Epidural Steroid Injection   WHAT YOU NEED TO KNOW:   An epidural steroid injection (CELESTINA) is a procedure to inject steroid medicine into the epidural space  The epidural space is between your spinal cord and vertebrae  Steroids reduce inflammation and fluid buildup in your spine that may be causing pain  You may be given pain medicine along with the steroids  ACTIVITY  · Do not drive or operate machinery today  · No strenuous activity today - bending, lifting, etc   · You may resume normal activites starting tomorrow - start slowly and as tolerated  · You may shower today, but no tub baths or hot tubs  · You may have numbness for several hours from the local anesthetic  Please use caution and common sense, especially with weight-bearing activities  CARE OF THE INJECTION SITE  · If you have soreness or pain, apply ice to the area today (20 minutes on/20 minutes off)  · Starting tomorrow, you may use warm, moist heat or ice if needed  · You may have an increase or change in your discomfort for 36-48 hours after your treatment  · Apply ice and continue with any pain medication you have been prescribed  · Notify the [facility] if you have any of the following: redness, drainage, swelling, headache, stiff neck or fever above 100°F     SPECIAL INSTRUCTIONS  · Our office will contact you in approximately 7 days for a progress report  MEDICATIONS  · Continue to take all routine medications  · Our office may have instructed you to hold some medications  If you have a problem specifically related to your procedure, please call our office at [phone]  Problems not related to your procedure should be directed to your primary care physician

## 2020-07-23 NOTE — OP NOTE
ATTENDING PHYSICIAN:  Min Yañez MD     PROCEDURE:  Lumbar interlaminar midline epidural steroid injection with steroid and local anesthetic under fluoroscopy at the L4-L5 level  PREPROCEDURE DIAGNOSIS:  Low back pain and lower extremity radicular symptoms  POSTPROCEDURE DIAGNOSIS:  Low back pain and lower extremity radicular symptoms  ANESTHESIA:  Local     ESTIMATED BLOOD LOSS:  Minimal     COMPLICATIONS:  None  LOCATION:  [address]  CONSENT:  Today's procedure, its potential benefits as well as its risks and potential side effects were reviewed  Discussed risks of the procedure including bleeding, infection, nerve irritation or damage, reactions to the medications, headache, failure of the pain to improve, and potential worsening of the pain were explained to the patient who verbalized understanding and who wished to proceed  Written informed consent was thereby obtained  DESCRIPTION OF THE PROCEDURE:  After written informed consent was obtained, the patient was taken to the fluoroscopy suite and placed in the prone position  Anatomical landmarks were identified by way of fluoroscopy in multiple views  The skin of the lumbar region was prepped and draped in the usual sterile fashion  Strict aseptic technique was utilized  The skin and subcutaneous tissues at the needle entry site were infiltrated with 3 mL of 1% preservative-free lidocaine using a 25-gauge 1-1/2-inch needle  A 20-gauge Tuohy needle was then incrementally advanced under fluoroscopy using a loss of resistance technique  Upon entering into the epidural space, a positive loss of resistance to air was noted and a characteristic "pop" was felt   Proper placement into the epidural space was confirmed with fluoroscopy in multiple views and by continued loss of resistance after injection of 1 mL of sterile preservative-free normal saline as well as the administration of contrast to delineate the epidural space  There were no paresthesias reported  After negative aspiration for CSF or heme, a 6 mL injectate consisting of 1 mL of Depo-Medrol 80 mg/mL and 1 mL of Depo-Medrol 40 mg/mL mixed with 4 mL of preservative-free normal saline was slowly injected  The patient tolerated the procedure well and all needles were removed with the tips intact  Hemostasis was maintained  There were no apparent paresthesias or complications  The skin was wiped clean and a Band-Aid was placed as appropriate  The patient was monitored for an appropriate period of time following the procedure and remained hemodynamically stable and neurovascularly intact following the procedure  The patient was ultimately discharged to home with supervision in good condition and instructed to call the office in a few days for an update or sooner as warranted  I was present and participated in all key and critical portions of this procedure      Radha Elizabeth MD  7/23/2020  3:33 PM

## 2020-07-29 ENCOUNTER — OFFICE VISIT (OUTPATIENT)
Dept: OBGYN CLINIC | Facility: CLINIC | Age: 80
End: 2020-07-29
Payer: MEDICARE

## 2020-07-29 VITALS — WEIGHT: 196 LBS | BODY MASS INDEX: 33.64 KG/M2 | TEMPERATURE: 99.6 F

## 2020-07-29 DIAGNOSIS — M17.12 PRIMARY OSTEOARTHRITIS OF LEFT KNEE: Primary | ICD-10-CM

## 2020-07-29 PROCEDURE — 20610 DRAIN/INJ JOINT/BURSA W/O US: CPT | Performed by: ORTHOPAEDIC SURGERY

## 2020-07-29 RX ORDER — HYALURONATE SODIUM 10 MG/ML
20 SYRINGE (ML) INTRAARTICULAR
Status: COMPLETED | OUTPATIENT
Start: 2020-07-29 | End: 2020-07-29

## 2020-07-29 RX ADMIN — Medication 20 MG: at 14:37

## 2020-07-29 NOTE — PROGRESS NOTES
Assessment/Plan:  1  Primary osteoarthritis of left knee         Follow-up 1 week  Subjective:   Blanca Kearney is a 79 y o  female who presents today for euflexxa #3 left knee  Review of Systems      Past Medical History:   Diagnosis Date    Diverticulosis     Glaucoma     Low back pain     Lumbar degenerative disc disease 2019    Peripheral neuropathy     Spinal stenosis        Past Surgical History:   Procedure Laterality Date    COLON SURGERY      resection    COLONOSCOPY N/A 2016    Procedure: COLONOSCOPY;  Surgeon: Brielle Lutz MD;  Location: [facility] GI LAB; Service:     COMBINED REDUCTION MAMMAPLASTY W/ LIPOSUCTION      EPIDURAL BLOCK INJECTION N/A 11/15/2019    Procedure: L4 L5 Lumbar Epidural Steroid Injection (52307); Surgeon: Micha Aden MD;  Location: [facility] MAIN OR;  Service: Pain Management     EPIDURAL BLOCK INJECTION N/A 3/12/2020    Procedure: L4 L5 Lumbar Epidural Steroid Injection (49875); Surgeon: Micha Aden MD;  Location: [facility] MAIN OR;  Service: Pain Management     EPIDURAL BLOCK INJECTION N/A 2020    Procedure: L4-L5 LUMBAR EPIDURAL STEROID INJECTION (#2) (13257);   Surgeon: Micha Aden MD;  Location: [facility] MAIN OR;  Service: Pain Management     HERNIA REPAIR      x3       Family History   Problem Relation Age of Onset    Heart disease Mother     Dementia Father     No Known Problems Sister     No Known Problems Brother     No Known Problems Maternal Aunt     No Known Problems Maternal Uncle     No Known Problems Paternal Aunt     No Known Problems Paternal Uncle     No Known Problems Maternal Grandmother     No Known Problems Maternal Grandfather     No Known Problems Paternal Grandmother     No Known Problems Paternal Grandfather        Social History     Occupational History    Not on file   Tobacco Use    Smoking status: Former Smoker     Last attempt to quit: 1976     Years since quittin 0    Smokeless tobacco: Never Used   Substance and Sexual Activity    Alcohol use: No    Drug use: No    Sexual activity: Not on file         Current Outpatient Medications:     ammonium lactate (LAC-HYDRIN) 12 % cream, Apply topically as needed for dry skin, Disp: 385 g, Rfl: 0    aspirin (ASPIRIN LOW DOSE) 81 mg EC tablet, aspirin ec low dose 81 mg tbec, Disp: , Rfl:     atorvastatin (LIPITOR) 40 mg tablet, Take 1 tablet (40 mg total) by mouth daily, Disp: 90 tablet, Rfl: 3    co-enzyme Q-10 50 MG capsule, Take 50 mg by mouth daily  , Disp: , Rfl:     COMFORT EZ PEN NEEDLES 31G X 6 MM MISC, , Disp: , Rfl:     diclofenac (VOLTAREN) 75 mg EC tablet, Take 1 tablet (75 mg total) by mouth 2 (two) times a day as needed (pain (with food)), Disp: 60 tablet, Rfl: 0    diclofenac sodium (VOLTAREN) 1 %, , Disp: , Rfl:     diltiazem (CARDIZEM CD) 180 mg 24 hr capsule, Take 180 mg by mouth daily, Disp: , Rfl:     diltiazem (TIAZAC) 180 MG 24 hr capsule, Take 1 capsule (180 mg total) by mouth daily, Disp: 90 capsule, Rfl: 3    gabapentin (NEURONTIN) 100 mg capsule, Take 1 capsule (100 mg total) by mouth daily at bedtime, Disp: 30 capsule, Rfl: 0    liraglutide (VICTOZA) injection, Inject under the skin daily, Disp: , Rfl:     meloxicam (MOBIC) 7 5 mg tablet, Take 1 tablet (7 5 mg total) by mouth daily as needed (pain), Disp: 30 tablet, Rfl: 0    montelukast (SINGULAIR) 10 mg tablet, montelukast 10 mg tablet, Disp: , Rfl:     omeprazole (PriLOSEC) 40 MG capsule, omeprazole 40 mg cpdr, Disp: , Rfl:     ONE TOUCH ULTRA TEST test strip, USE AS DIRECTED TO test blood sugar TWICE DAILY, Disp: , Rfl: 1    PARoxetine (PAXIL) 10 mg tablet, , Disp: , Rfl:     tiZANidine (ZANAFLEX) 2 mg tablet, TAKE 1 TABLET BY MOUTH EVERY DAY AS NEEDED FOR MUSCLE TIGHTNESS, Disp: , Rfl:     traMADol (ULTRAM) 50 mg tablet, Take 1 tablet (50 mg total) by mouth every 8 (eight) hours as needed for moderate pain, Disp: 15 tablet, Rfl: 0    Travoprost (TRAVATAN OP), Apply to eye daily at bedtime, Disp: , Rfl:     venlafaxine (EFFEXOR-XR) 75 mg 24 hr capsule, Take 75 mg by mouth daily, Disp: , Rfl: 0    diclofenac sodium (VOLTAREN) 1 %, Apply 2 g topically 4 (four) times a day for 10 days, Disp: 80 g, Rfl: 0    No Known Allergies    Objective:  Vitals:    07/29/20 1419   Temp: 99 6 °F (37 6 °C)       Ortho Exam    Physical Exam    Large joint arthrocentesis: L knee  Date/Time: 7/29/2020 2:37 PM  Consent given by: patient  Site marked: site marked  Supporting Documentation  Indications: pain   Procedure Details  Location: knee - L knee  Preparation: Patient was prepped and draped in the usual sterile fashion (Alcohol prep)  Needle size: 22 G  Ultrasound guidance: no  Approach: anterolateral  Medications administered: 20 mg Sodium Hyaluronate 20 MG/2ML    Patient tolerance: patient tolerated the procedure well with no immediate complications  Dressing:  Sterile dressing applied

## 2020-07-30 ENCOUNTER — TELEPHONE (OUTPATIENT)
Dept: PAIN MEDICINE | Facility: CLINIC | Age: 80
End: 2020-07-30

## 2020-08-05 NOTE — PROGRESS NOTES
Assessment/Plan:    Patient educated on proper supportive shoe gear and use of diabetic insoles, to accommodate his deformity and alleviate pressure prevent ulcerations  Patient advised and maintaining PCP appointment for the management of diabetes, and neuropathy  Patient educated and daily foot inspection and the signs of infection  Patient advised and reporting to the clinic or emergency room if any noticed  Diagnoses and all orders for this visit:    Diabetic polyneuropathy associated with type 2 diabetes mellitus (HCC)  -     gabapentin (NEURONTIN) 100 mg capsule; Take 1 capsule (100 mg total) by mouth daily at bedtime    Actinic keratosis  -     ammonium lactate (LAC-HYDRIN) 12 % cream; Apply topically as needed for dry skin  -     diclofenac sodium (VOLTAREN) 1 %; Apply 2 g topically 4 (four) times a day for 10 days    Arthralgia of both ankles    Pain in both feet          Subjective:      Patient ID: Apryl Otero is a 79 y o  female  80-year-old diabetic female presents to the office with painful tingling and numbness to bilateral foot ankle, patient states that she has her condition for few months now but recently become very intense that wakes her up from sleep patient has chronic history of arthritis and radiculopathy, patient also report raised skin lesions toe bilateral foot and ankle patient denies constitutional symptoms, patient denies trauma to the foot      The following portions of the patient's history were reviewed and updated as appropriate: allergies, current medications, past family history, past medical history, past social history, past surgical history and problem list     Review of Systems   Constitutional: Negative  Respiratory: Negative  Cardiovascular: Negative  Musculoskeletal: Positive for arthralgias, back pain, gait problem and joint swelling  Skin: Positive for rash                 Historical Information   Past Medical History:   Diagnosis Date    Diverticulosis     Glaucoma     Low back pain     Lumbar degenerative disc disease 2019    Peripheral neuropathy     Spinal stenosis      Past Surgical History:   Procedure Laterality Date    COLON SURGERY      resection    COLONOSCOPY N/A 2016    Procedure: COLONOSCOPY;  Surgeon: Rod Yoon MD;  Location: [place] [address] GI LAB; Service:     COMBINED REDUCTION MAMMAPLASTY W/ LIPOSUCTION      EPIDURAL BLOCK INJECTION N/A 11/15/2019    Procedure: L4 L5 Lumbar Epidural Steroid Injection (87859); Surgeon: Junior Mattson MD;  Location: [facility] MAIN OR;  Service: Pain Management     EPIDURAL BLOCK INJECTION N/A 3/12/2020    Procedure: L4 L5 Lumbar Epidural Steroid Injection (23086); Surgeon: Junior Mattson MD;  Location: [facility] MAIN OR;  Service: Pain Management     EPIDURAL BLOCK INJECTION N/A 2020    Procedure: L4-L5 LUMBAR EPIDURAL STEROID INJECTION (#2) (35641);   Surgeon: Junior Mattson MD;  Location: [facility] MAIN OR;  Service: Pain Management     HERNIA REPAIR      x3     Social History   Social History     Substance and Sexual Activity   Alcohol Use No     Social History     Substance and Sexual Activity   Drug Use No     Social History     Tobacco Use   Smoking Status Former Smoker    Last attempt to quit: 1976    Years since quittin 0   Smokeless Tobacco Never Used     Family History:   Family History   Problem Relation Age of Onset    Heart disease Mother     Dementia Father     No Known Problems Sister     No Known Problems Brother     No Known Problems Maternal Aunt     No Known Problems Maternal Uncle     No Known Problems Paternal Aunt     No Known Problems Paternal Uncle     No Known Problems Maternal Grandmother     No Known Problems Maternal Grandfather     No Known Problems Paternal Grandmother     No Known Problems Paternal Grandfather        Meds/Allergies   all medications and allergies reviewed  No Known Allergies    Objective:      /83   Pulse 99 Resp 17   Ht 5' 4" (1 626 m)   Wt 88 9 kg (196 lb)   BMI 33 64 kg/m²          Physical Exam   Constitutional: She is oriented to person, place, and time  She appears well-developed  Non-toxic appearance  She does not appear ill  No distress  HENT:   Head: Normocephalic  Cardiovascular:   Pulses:       Dorsalis pedis pulses are 1+ on the right side and 1+ on the left side  Posterior tibial pulses are 0 on the right side and 0 on the left side  Palpable DP pulse, nonpalpable PT pulse, CFT is less than 3 seconds, temperature gradient within normal limit, a trophic skin changes noted with skin thinning in shiny, patient report occasional claudication to bilateral calf, localized edema foot and ankle Q9   Pulmonary/Chest: Effort normal    Musculoskeletal:         General: Tenderness and deformity present  Comments: Pes planus type foot pain on palpation and range of motion of the 1st MPJ, metatarsal heads bilateral foot, pain on palpation on range of motion of the ST joint, crepitus noted in midfoot joint  Pain on palpation and range of motion of subtalar and ankle joint bilateral   Neurological: She is alert and oriented to person, place, and time  She displays atrophy and abnormal reflex  A sensory deficit is present  Protective sensation diminished bilateral foot, no focal motor deficit   Skin: Skin is dry  Capillary refill takes 2 to 3 seconds  She is not diaphoretic     Diffuse actinic keratosis changes to bilateral foot and ankle, areas were hypo and hyperpigmentation, diffuse xerosis dorsal foot and ankle    Foot Exam    Right Foot/Ankle     Neurovascular  Dorsalis pedis: 1+  Posterior tibial: 0      Left Foot/Ankle      Neurovascular  Dorsalis pedis: 1+  Posterior tibial: 0

## 2020-08-14 DIAGNOSIS — E11.42 DIABETIC POLYNEUROPATHY ASSOCIATED WITH TYPE 2 DIABETES MELLITUS (HCC): ICD-10-CM

## 2020-08-17 NOTE — TELEPHONE ENCOUNTER
Patient states  she has 50 % of improvement & pain level 2/10   She has a little pain in the neck   Still is getting muscle spasm in the morning   Please be advise thank you          Patient call back # [phone]

## 2020-08-20 RX ORDER — GABAPENTIN 100 MG/1
CAPSULE ORAL
Qty: 30 CAPSULE | Refills: 0 | Status: SHIPPED | OUTPATIENT
Start: 2020-08-20 | End: 2020-08-26

## 2020-08-26 DIAGNOSIS — Z20.822 COVID-19 RULED OUT BY LABORATORY TESTING: ICD-10-CM

## 2020-08-26 PROCEDURE — U0003 INFECTIOUS AGENT DETECTION BY NUCLEIC ACID (DNA OR RNA); SEVERE ACUTE RESPIRATORY SYNDROME CORONAVIRUS 2 (SARS-COV-2) (CORONAVIRUS DISEASE [COVID-19]), AMPLIFIED PROBE TECHNIQUE, MAKING USE OF HIGH THROUGHPUT TECHNOLOGIES AS DESCRIBED BY CMS-2020-01-R: HCPCS | Performed by: INTERNAL MEDICINE

## 2020-08-26 RX ORDER — POLYETHYLENE GLYCOL-3350, SODIUM CHLORIDE, POTASSIUM CHLORIDE AND SODIUM BICARBONATE 420; 11.2; 5.72; 1.48 G/438.4G; G/438.4G; G/438.4G; G/438.4G
POWDER, FOR SOLUTION ORAL ONCE
COMMUNITY
Start: 2020-07-10

## 2020-08-26 NOTE — PRE-PROCEDURE INSTRUCTIONS
Pre-Surgery Instructions:   Medication Instructions    ammonium lactate (LAC-HYDRIN) 12 % cream Patient was instructed by Physician and understands   aspirin (ASPIRIN LOW DOSE) 81 mg EC tablet Patient was instructed by Physician and understands   atorvastatin (LIPITOR) 40 mg tablet Patient was instructed by Physician and understands   Coenzyme Q10 (Co Q 10) 100 MG CAPS Patient was instructed by Physician and understands   COMFORT EZ PEN NEEDLES 31G X 6 MM MISC Patient was instructed by Physician and understands   diclofenac (VOLTAREN) 75 mg EC tablet Patient was instructed by Physician and understands   diclofenac sodium (VOLTAREN) 1 % Patient was instructed by Physician and understands   diltiazem (TIAZAC) 180 MG 24 hr capsule Patient was instructed by Physician and understands  Evelene Foyer with Flavor Pack 420 g solution Patient was instructed by Physician and understands   liraglutide (VICTOZA) injection Patient was instructed by Physician and understands   montelukast (SINGULAIR) 10 mg tablet Patient was instructed by Physician and understands   omeprazole (PriLOSEC) 40 MG capsule Patient was instructed by Physician and understands   ONE TOUCH ULTRA TEST test strip Patient was instructed by Physician and understands   PARoxetine (PAXIL) 10 mg tablet Patient was instructed by Physician and understands   Travoprost (TRAVATAN OP) Patient was instructed by Physician and understands  Pt to follow Dr Annalisa Sosa instructions  Pt to have covid 19 test on either 8/26 or 8/27 at the [facility]    will be [employer] with the arrival time of 9 am

## 2020-08-27 LAB — SARS-COV-2 RNA SPEC QL NAA+PROBE: NOT DETECTED

## 2020-08-31 ENCOUNTER — ANESTHESIA (OUTPATIENT)
Dept: GASTROENTEROLOGY | Facility: AMBULARY SURGERY CENTER | Age: 80
End: 2020-08-31

## 2020-08-31 ENCOUNTER — HOSPITAL ENCOUNTER (OUTPATIENT)
Dept: GASTROENTEROLOGY | Facility: AMBULARY SURGERY CENTER | Age: 80
Setting detail: OUTPATIENT SURGERY
Discharge: HOME | End: 2020-08-31
Attending: INTERNAL MEDICINE | Admitting: INTERNAL MEDICINE
Payer: MEDICARE

## 2020-08-31 ENCOUNTER — ANESTHESIA EVENT (OUTPATIENT)
Dept: GASTROENTEROLOGY | Facility: AMBULARY SURGERY CENTER | Age: 80
End: 2020-08-31

## 2020-08-31 VITALS
SYSTOLIC BLOOD PRESSURE: 144 MMHG | TEMPERATURE: 97.7 F | RESPIRATION RATE: 18 BRPM | BODY MASS INDEX: 33.46 KG/M2 | HEART RATE: 75 BPM | HEIGHT: 64 IN | DIASTOLIC BLOOD PRESSURE: 66 MMHG | OXYGEN SATURATION: 96 % | WEIGHT: 196 LBS

## 2020-08-31 DIAGNOSIS — K21.00 GASTRO-ESOPHAGEAL REFLUX DISEASE WITH ESOPHAGITIS: ICD-10-CM

## 2020-08-31 DIAGNOSIS — Z20.822 COVID-19 RULED OUT BY LABORATORY TESTING: Primary | ICD-10-CM

## 2020-08-31 DIAGNOSIS — Z86.010 PERSONAL HISTORY OF COLONIC POLYPS: ICD-10-CM

## 2020-08-31 DIAGNOSIS — R19.4 CHANGE IN BOWEL HABIT: ICD-10-CM

## 2020-08-31 DIAGNOSIS — Z87.11 PERSONAL HISTORY OF PEPTIC ULCER DISEASE: ICD-10-CM

## 2020-08-31 PROCEDURE — 88305 TISSUE EXAM BY PATHOLOGIST: CPT | Performed by: PATHOLOGY

## 2020-08-31 PROCEDURE — 87635 SARS-COV-2 COVID-19 AMP PRB: CPT

## 2020-08-31 RX ORDER — PROPOFOL 10 MG/ML
INJECTION, EMULSION INTRAVENOUS AS NEEDED
Status: DISCONTINUED | OUTPATIENT
Start: 2020-08-31 | End: 2020-08-31

## 2020-08-31 RX ORDER — SODIUM CHLORIDE, SODIUM LACTATE, POTASSIUM CHLORIDE, CALCIUM CHLORIDE 600; 310; 30; 20 MG/100ML; MG/100ML; MG/100ML; MG/100ML
125 INJECTION, SOLUTION INTRAVENOUS CONTINUOUS
Status: DISCONTINUED | OUTPATIENT
Start: 2020-08-31 | End: 2020-09-04 | Stop reason: HOSPADM

## 2020-08-31 RX ORDER — LIDOCAINE HYDROCHLORIDE 10 MG/ML
INJECTION, SOLUTION EPIDURAL; INFILTRATION; INTRACAUDAL; PERINEURAL AS NEEDED
Status: DISCONTINUED | OUTPATIENT
Start: 2020-08-31 | End: 2020-08-31

## 2020-08-31 RX ORDER — SODIUM CHLORIDE, SODIUM LACTATE, POTASSIUM CHLORIDE, CALCIUM CHLORIDE 600; 310; 30; 20 MG/100ML; MG/100ML; MG/100ML; MG/100ML
INJECTION, SOLUTION INTRAVENOUS CONTINUOUS PRN
Status: DISCONTINUED | OUTPATIENT
Start: 2020-08-31 | End: 2020-08-31

## 2020-08-31 RX ADMIN — SODIUM CHLORIDE, SODIUM LACTATE, POTASSIUM CHLORIDE, AND CALCIUM CHLORIDE: .6; .31; .03; .02 INJECTION, SOLUTION INTRAVENOUS at 09:12

## 2020-08-31 RX ADMIN — PROPOFOL 50 MG: 10 INJECTION, EMULSION INTRAVENOUS at 09:14

## 2020-08-31 RX ADMIN — PROPOFOL 50 MG: 10 INJECTION, EMULSION INTRAVENOUS at 09:21

## 2020-08-31 RX ADMIN — PROPOFOL 50 MG: 10 INJECTION, EMULSION INTRAVENOUS at 09:28

## 2020-08-31 RX ADMIN — PROPOFOL 50 MG: 10 INJECTION, EMULSION INTRAVENOUS at 09:16

## 2020-08-31 RX ADMIN — SODIUM CHLORIDE, SODIUM LACTATE, POTASSIUM CHLORIDE, AND CALCIUM CHLORIDE 125 ML/HR: .6; .31; .03; .02 INJECTION, SOLUTION INTRAVENOUS at 09:10

## 2020-08-31 RX ADMIN — PROPOFOL 50 MG: 10 INJECTION, EMULSION INTRAVENOUS at 09:19

## 2020-08-31 RX ADMIN — PROPOFOL 50 MG: 10 INJECTION, EMULSION INTRAVENOUS at 09:25

## 2020-08-31 RX ADMIN — LIDOCAINE HYDROCHLORIDE 50 MG: 10 INJECTION, SOLUTION EPIDURAL; INFILTRATION; INTRACAUDAL; PERINEURAL at 09:12

## 2020-08-31 RX ADMIN — PROPOFOL 100 MG: 10 INJECTION, EMULSION INTRAVENOUS at 09:12

## 2020-08-31 NOTE — ANESTHESIA POSTPROCEDURE EVALUATION
Post-Op Assessment Note    CV Status:  Stable  Pain Score: 0    Pain management: adequate     Mental Status:  Sleepy   Hydration Status:  Euvolemic   PONV Controlled:  Controlled   Airway Patency:  Patent      Post Op Vitals Reviewed: Yes      Staff: CRNA         No complications documented      BP   95/52   Temp      Pulse  65 nsr   Resp   14   SpO2   95% RA

## 2020-08-31 NOTE — ANESTHESIA PREPROCEDURE EVALUATION
Procedure:  EGD  COLONOSCOPY    Relevant Problems   MUSCULOSKELETAL   (+) Chronic bilateral low back pain with bilateral sciatica   (+) Low back pain   (+) Lumbar degenerative disc disease      NEURO/PSYCH   (+) Chronic pain syndrome        Physical Exam    Airway    Mallampati score: II  TM Distance: >3 FB  Neck ROM: full     Dental   No notable dental hx     Cardiovascular  Cardiovascular exam normal    Pulmonary  Pulmonary exam normal     Other Findings        Anesthesia Plan  ASA Score- 2     Anesthesia Type- IV sedation with anesthesia with ASA Monitors  Additional Monitors:   Airway Plan:           Plan Factors-Exercise tolerance (METS): >4 METS  Chart reviewed  EKG reviewed  Imaging results reviewed  Existing labs reviewed  Patient summary reviewed  Patient is not a current smoker  Induction-     Postoperative Plan-     Informed Consent- Anesthetic plan and risks discussed with patient  I personally reviewed this patient with the CRNA  Discussed and agreed on the Anesthesia Plan with the CRNA  Nitza Rosario

## 2020-08-31 NOTE — H&P
History and Physical -  [facility]  Jayesh Le 79 y o  female MRN: [id number]                  HPI: Jayesh Le is a 79y o  year old female who presents for very pleasant lady here for EGD and colonoscopy  She has a history of chronic reflux, peptic ulcer disease and recent change in bowel habits  Her last colonoscopy was done elsewhere about 2 years ago  REVIEW OF SYSTEMS: Per the HPI, and otherwise unremarkable  Historical Information   Past Medical History:   Diagnosis Date    Arthritis     DJD- left thumb DJD,most joints    Borderline diabetes     Change in bowel habits     Chronic pain disorder     lumbar    Colon polyp     Constipation     unable to expell the stool completely with BM    Deviated septum     Diverticulosis     DJD (degenerative joint disease)     pt does get injections to the knee with Dr Jayson Dobbs GERD (gastroesophageal reflux disease)     Glaucoma     Hearing aid worn     bilateral ears for background noise    Hyperlipidemia     Irregular heart beat     "skips a beat"    Low back pain     Lumbar degenerative disc disease 9/16/2019    Muscle spasm     legs    Peripheral neuropathy     PND (post-nasal drip)     Presence of upper and lower permanent dental bridges     Sciatica     Spinal stenosis     Wears glasses      Past Surgical History:   Procedure Laterality Date    BREAST SURGERY  2005    bilateral reduction    COLON SURGERY  2014    resection with colostomy-ruptured "intestine"    COLONOSCOPY N/A 7/11/2016    Procedure: COLONOSCOPY;  Surgeon: Duy Peoples MD;  Location: [facility] GI LAB; Service:     COLOSTOMY CLOSURE  2015    COMBINED REDUCTION MAMMAPLASTY W/ LIPOSUCTION      EPIDURAL BLOCK INJECTION N/A 11/15/2019    Procedure: L4 L5 Lumbar Epidural Steroid Injection (45052);   Surgeon: Kojo Flores MD;  Location: [facility] OR;  Service: Pain Management     EPIDURAL BLOCK INJECTION N/A 3/12/2020 Procedure: L4 L5 Lumbar Epidural Steroid Injection (26486); Surgeon: Carmenza Arriaga MD;  Location: [facility] MAIN OR;  Service: Pain Management     EPIDURAL BLOCK INJECTION N/A 2020    Procedure: L4-L5 LUMBAR EPIDURAL STEROID INJECTION (#2) (38171); Surgeon: Carmenza Arriaga MD;  Location: [facility] MAIN OR;  Service: Pain Management     HERNIA REPAIR  2016    x3     Social History   Social History     Substance and Sexual Activity   Alcohol Use No     Social History     Substance and Sexual Activity   Drug Use Yes    Types: Marijuana    Comment: medical at [facility]     Social History     Tobacco Use   Smoking Status Former Smoker    Last attempt to quit: 1976    Years since quittin 1   Smokeless Tobacco Never Used     Family History   Problem Relation Age of Onset    Heart disease Mother     Dementia Father     No Known Problems Sister     No Known Problems Brother     No Known Problems Maternal Aunt     No Known Problems Maternal Uncle     No Known Problems Paternal Aunt     No Known Problems Paternal Uncle     No Known Problems Maternal Grandmother     No Known Problems Maternal Grandfather     No Known Problems Paternal Grandmother     No Known Problems Paternal Grandfather        Meds/Allergies     (Not in a hospital admission)      No Known Allergies    Objective     Ht 5' 4" (1 626 m)   Wt 88 9 kg (196 lb)   BMI 33 64 kg/m²       PHYSICAL EXAM    Gen: NAD  CV: RRR  CHEST: Clear  ABD: soft, NT/ND  EXT: no edema      ASSESSMENT/PLAN:  This is a 79y o  year old female here for EGD and colonoscopy, and she is stable and optimized for her procedure

## 2020-11-08 ENCOUNTER — APPOINTMENT (EMERGENCY)
Dept: RADIOLOGY | Facility: HOSPITAL | Age: 80
End: 2020-11-08
Payer: MEDICARE

## 2020-11-08 ENCOUNTER — HOSPITAL ENCOUNTER (EMERGENCY)
Facility: HOSPITAL | Age: 80
Discharge: HOME | End: 2020-11-08
Attending: EMERGENCY MEDICINE
Payer: MEDICARE

## 2020-11-08 VITALS
RESPIRATION RATE: 19 BRPM | OXYGEN SATURATION: 95 % | TEMPERATURE: 99.7 F | BODY MASS INDEX: 32.44 KG/M2 | HEART RATE: 78 BPM | DIASTOLIC BLOOD PRESSURE: 84 MMHG | WEIGHT: 189 LBS | SYSTOLIC BLOOD PRESSURE: 138 MMHG
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
## 2020-11-08 DIAGNOSIS — S60.229A CONTUSION OF HAND: Primary | ICD-10-CM

## 2020-11-08 DIAGNOSIS — S62.609A FINGER FRACTURE: ICD-10-CM

## 2020-11-08 PROCEDURE — 99282 EMERGENCY DEPT VISIT SF MDM: CPT | Performed by: PHYSICIAN ASSISTANT

## 2020-11-08 PROCEDURE — 73130 X-RAY EXAM OF HAND: CPT

## 2020-11-08 PROCEDURE — 73564 X-RAY EXAM KNEE 4 OR MORE: CPT

## 2020-11-08 PROCEDURE — 73110 X-RAY EXAM OF WRIST: CPT

## 2020-11-08 PROCEDURE — 99283 EMERGENCY DEPT VISIT LOW MDM: CPT

## 2020-11-08 PROCEDURE — 73060 X-RAY EXAM OF HUMERUS: CPT

## 2020-12-18 DIAGNOSIS — R00.2 PALPITATIONS: Primary | ICD-10-CM

## 2020-12-18 RX ORDER — DILTIAZEM HYDROCHLORIDE 180 MG/1
CAPSULE, COATED, EXTENDED RELEASE ORAL
Qty: 90 CAPSULE | Refills: 1 | Status: SHIPPED | OUTPATIENT
Start: 2020-12-18 | End: 2021-07-04

## 2021-01-01 ENCOUNTER — HOSPITAL ENCOUNTER (EMERGENCY)
Facility: HOSPITAL | Age: 81
Discharge: HOME | End: 2021-01-01
Attending: EMERGENCY MEDICINE | Admitting: EMERGENCY MEDICINE
Payer: MEDICARE

## 2021-01-01 ENCOUNTER — APPOINTMENT (EMERGENCY)
Dept: RADIOLOGY | Facility: HOSPITAL | Age: 81
End: 2021-01-01
Payer: MEDICARE

## 2021-01-01 VITALS
BODY MASS INDEX: 32.44 KG/M2 | TEMPERATURE: 97.8 F | RESPIRATION RATE: 18 BRPM | WEIGHT: 190 LBS | SYSTOLIC BLOOD PRESSURE: 134 MMHG | HEART RATE: 72 BPM | DIASTOLIC BLOOD PRESSURE: 78 MMHG | OXYGEN SATURATION: 94 % | HEIGHT: 64 IN

## 2021-01-01 DIAGNOSIS — J02.9 SORE THROAT: Primary | ICD-10-CM

## 2021-01-01 DIAGNOSIS — R51.9 HEADACHE: ICD-10-CM

## 2021-01-01 LAB
ALBUMIN SERPL BCP-MCNC: 3.5 G/DL (ref 3.5–5)
ALP SERPL-CCNC: 91 U/L (ref 46–116)
ALT SERPL W P-5'-P-CCNC: 24 U/L (ref 12–78)
ANION GAP SERPL CALCULATED.3IONS-SCNC: 10 MMOL/L (ref 4–13)
AST SERPL W P-5'-P-CCNC: 25 U/L (ref 5–45)
BASOPHILS # BLD AUTO: 0.02 THOUSANDS/ΜL (ref 0–0.1)
BASOPHILS NFR BLD AUTO: 1 % (ref 0–1)
BILIRUB SERPL-MCNC: 0.3 MG/DL (ref 0.2–1)
BUN SERPL-MCNC: 16 MG/DL (ref 5–25)
CALCIUM SERPL-MCNC: 8.8 MG/DL (ref 8.3–10.1)
CHLORIDE SERPL-SCNC: 103 MMOL/L (ref 100–108)
CO2 SERPL-SCNC: 23 MMOL/L (ref 21–32)
CREAT SERPL-MCNC: 0.87 MG/DL (ref 0.6–1.3)
EOSINOPHIL # BLD AUTO: 0.01 THOUSAND/ΜL (ref 0–0.61)
EOSINOPHIL NFR BLD AUTO: 0 % (ref 0–6)
ERYTHROCYTE [DISTWIDTH] IN BLOOD BY AUTOMATED COUNT: 13.9 % (ref 11.6–15.1)
ERYTHROCYTE [SEDIMENTATION RATE] IN BLOOD: 10 MM/HOUR (ref 0–29)
GFR SERPL CREATININE-BSD FRML MDRD: 64 ML/MIN/1.73SQ M
GLUCOSE SERPL-MCNC: 92 MG/DL (ref 65–140)
HCT VFR BLD AUTO: 44.9 % (ref 34.8–46.1)
HGB BLD-MCNC: 14.7 G/DL (ref 11.5–15.4)
IMM GRANULOCYTES # BLD AUTO: 0.01 THOUSAND/UL (ref 0–0.2)
IMM GRANULOCYTES NFR BLD AUTO: 0 % (ref 0–2)
LYMPHOCYTES # BLD AUTO: 0.95 THOUSANDS/ΜL (ref 0.6–4.47)
LYMPHOCYTES NFR BLD AUTO: 25 % (ref 14–44)
MAGNESIUM SERPL-MCNC: 1.8 MG/DL (ref 1.6–2.6)
MCH RBC QN AUTO: 29.5 PG (ref 26.8–34.3)
MCHC RBC AUTO-ENTMCNC: 32.7 G/DL (ref 31.4–37.4)
MCV RBC AUTO: 90 FL (ref 82–98)
MONOCYTES # BLD AUTO: 0.72 THOUSAND/ΜL (ref 0.17–1.22)
MONOCYTES NFR BLD AUTO: 19 % (ref 4–12)
NEUTROPHILS # BLD AUTO: 2.16 THOUSANDS/ΜL (ref 1.85–7.62)
NEUTS SEG NFR BLD AUTO: 55 % (ref 43–75)
NRBC BLD AUTO-RTO: 0 /100 WBCS
PLATELET # BLD AUTO: 195 THOUSANDS/UL (ref 149–390)
PMV BLD AUTO: 11.1 FL (ref 8.9–12.7)
POTASSIUM SERPL-SCNC: 3.6 MMOL/L (ref 3.5–5.3)
PROT SERPL-MCNC: 7.1 G/DL (ref 6.4–8.2)
RBC # BLD AUTO: 4.99 MILLION/UL (ref 3.81–5.12)
S PYO DNA THROAT QL NAA+PROBE: NORMAL
SODIUM SERPL-SCNC: 136 MMOL/L (ref 136–145)
WBC # BLD AUTO: 3.87 THOUSAND/UL (ref 4.31–10.16)

## 2021-01-01 PROCEDURE — 36415 COLL VENOUS BLD VENIPUNCTURE: CPT | Performed by: EMERGENCY MEDICINE

## 2021-01-01 PROCEDURE — 85652 RBC SED RATE AUTOMATED: CPT | Performed by: EMERGENCY MEDICINE

## 2021-01-01 PROCEDURE — 87651 STREP A DNA AMP PROBE: CPT | Performed by: EMERGENCY MEDICINE

## 2021-01-01 PROCEDURE — G1004 CDSM NDSC: HCPCS

## 2021-01-01 PROCEDURE — 99284 EMERGENCY DEPT VISIT MOD MDM: CPT | Performed by: EMERGENCY MEDICINE

## 2021-01-01 PROCEDURE — 96375 TX/PRO/DX INJ NEW DRUG ADDON: CPT

## 2021-01-01 PROCEDURE — 99283 EMERGENCY DEPT VISIT LOW MDM: CPT

## 2021-01-01 PROCEDURE — 85025 COMPLETE CBC W/AUTO DIFF WBC: CPT | Performed by: EMERGENCY MEDICINE

## 2021-01-01 PROCEDURE — 80053 COMPREHEN METABOLIC PANEL: CPT | Performed by: EMERGENCY MEDICINE

## 2021-01-01 PROCEDURE — U0003 INFECTIOUS AGENT DETECTION BY NUCLEIC ACID (DNA OR RNA); SEVERE ACUTE RESPIRATORY SYNDROME CORONAVIRUS 2 (SARS-COV-2) (CORONAVIRUS DISEASE [COVID-19]), AMPLIFIED PROBE TECHNIQUE, MAKING USE OF HIGH THROUGHPUT TECHNOLOGIES AS DESCRIBED BY CMS-2020-01-R: HCPCS | Performed by: EMERGENCY MEDICINE

## 2021-01-01 PROCEDURE — 70450 CT HEAD/BRAIN W/O DYE: CPT

## 2021-01-01 PROCEDURE — 83735 ASSAY OF MAGNESIUM: CPT | Performed by: EMERGENCY MEDICINE

## 2021-01-01 PROCEDURE — 96374 THER/PROPH/DIAG INJ IV PUSH: CPT

## 2021-01-01 RX ORDER — METOCLOPRAMIDE 10 MG/1
10 TABLET ORAL EVERY 6 HOURS
Qty: 10 TABLET | Refills: 0 | Status: SHIPPED | OUTPATIENT
Start: 2021-01-01 | End: 2021-01-04

## 2021-01-01 RX ORDER — METOCLOPRAMIDE HYDROCHLORIDE 5 MG/ML
10 INJECTION INTRAMUSCULAR; INTRAVENOUS ONCE
Status: COMPLETED | OUTPATIENT
Start: 2021-01-01 | End: 2021-01-01

## 2021-01-01 RX ORDER — DIPHENHYDRAMINE HYDROCHLORIDE 50 MG/ML
25 INJECTION INTRAMUSCULAR; INTRAVENOUS ONCE
Status: COMPLETED | OUTPATIENT
Start: 2021-01-01 | End: 2021-01-01

## 2021-01-01 RX ADMIN — METOCLOPRAMIDE 10 MG: 5 INJECTION, SOLUTION INTRAMUSCULAR; INTRAVENOUS at 12:48

## 2021-01-01 RX ADMIN — DIPHENHYDRAMINE HYDROCHLORIDE 25 MG: 50 INJECTION, SOLUTION INTRAMUSCULAR; INTRAVENOUS at 12:49

## 2021-01-01 NOTE — DISCHARGE INSTRUCTIONS
-please take reglan as prescribed, and follow up with your doctor  -your covid testing is pending,quarantine yourself until your results are back in 3-4 days, someone will call you with the results  -please obtain a pulse oximeter from a local pharmacy, and if you have significant shortness of breath or your pulse ox is less than 90% return to the ED

## 2021-01-01 NOTE — ED NOTES
Pt feeling much better, ambulated to waiting room, gait steady       Oanh Ferreira RN  01/01/21 4800

## 2021-01-01 NOTE — ED PROVIDER NOTES
History  Chief Complaint   Patient presents with    Sore Throat     patient requesting a covid test      80 y/o female presents with sore throat, and a headache with intermittent fevers over the past few days  Patient denies neck stiffness, rash, vomiting, nausea, head trauma, not on anticoagulation  Sick contacts at home  No diarrhea, constipation  No vision changes, slurred speech, expressive aphasia or any other symptoms  The headache is sharp generalized non radiating currently 6/10 nothing makes it better or worse  History provided by:  Patient   used: No        Prior to Admission Medications   Prescriptions Last Dose Informant Patient Reported? Taking?    COMFORT EZ PEN NEEDLES 31G X 6 MM MISC  Self Yes No   Sig: daily    Coenzyme Q10 (Co Q 10) 100 MG CAPS   Yes No   Sig: Take by mouth daily with lunch   GaviLyte-N with Flavor Pack 420 g solution   Yes No   Sig: once Take as directed   ONE TOUCH ULTRA TEST test strip  Self Yes No   Sig: daily as needed    PARoxetine (PAXIL) 10 mg tablet   Yes No   Sig: 10 mg every morning    Travoprost (TRAVATAN OP)  Self Yes No   Sig: Apply to eye daily at bedtime   ammonium lactate (LAC-HYDRIN) 12 % cream   No No   Sig: Apply topically as needed for dry skin   aspirin (ASPIRIN LOW DOSE) 81 mg EC tablet  Self Yes No   Si mg every morning    atorvastatin (LIPITOR) 40 mg tablet   No No   Sig: Take 1 tablet (40 mg total) by mouth daily   Patient taking differently: Take 40 mg by mouth every evening    diltiazem (CARDIZEM CD) 180 mg 24 hr capsule   No No   Sig: TAKE 1 CAPSULE BY MOUTH EVERY DAY   diltiazem (TIAZAC) 180 MG 24 hr capsule   No No   Sig: Take 1 capsule (180 mg total) by mouth daily   Patient taking differently: Take 180 mg by mouth daily after lunch    liraglutide (VICTOZA) injection  Self Yes No   Sig: Inject 0 6 mg under the skin daily after breakfast    montelukast (SINGULAIR) 10 mg tablet  Self Yes No   Sig: 10 mg every morning omeprazole (PriLOSEC) 40 MG capsule  Self Yes No   Si mg every morning       Facility-Administered Medications: None       Past Medical History:   Diagnosis Date    Arthritis     DJD- left thumb DJD,most joints    Borderline diabetes     Change in bowel habits     Chronic pain disorder     lumbar    Colon polyp     Constipation     unable to expell the stool completely with BM    Deviated septum     Diverticulosis     DJD (degenerative joint disease)     pt does get injections to the knee with Dr Dom Mitchell GERD (gastroesophageal reflux disease)     Glaucoma     Hearing aid worn     bilateral ears for background noise    Hyperlipidemia     Irregular heart beat     "skips a beat"    Low back pain     Lumbar degenerative disc disease 2019    Muscle spasm     legs    Peripheral neuropathy     PND (post-nasal drip)     Presence of upper and lower permanent dental bridges     Sciatica     Spinal stenosis     Wears glasses        Past Surgical History:   Procedure Laterality Date    BREAST SURGERY      bilateral reduction    COLON SURGERY      resection with colostomy-ruptured "intestine"    COLONOSCOPY N/A 2016    Procedure: COLONOSCOPY;  Surgeon: Ran Gutierrez MD;  Location: [facility]; Service:     COLOSTOMY CLOSURE      COMBINED REDUCTION MAMMAPLASTY W/ LIPOSUCTION      EPIDURAL BLOCK INJECTION N/A 11/15/2019    Procedure: L4 L5 Lumbar Epidural Steroid Injection (91957); Surgeon: Landy Saravia MD;  Location: [facility] MAIN OR;  Service: Pain Management     EPIDURAL BLOCK INJECTION N/A 3/12/2020    Procedure: L4 L5 Lumbar Epidural Steroid Injection (63494); Surgeon: Landy Saravia MD;  Location: [facility] OR;  Service: Pain Management     EPIDURAL BLOCK INJECTION N/A 2020    Procedure: L4-L5 LUMBAR EPIDURAL STEROID INJECTION (#2) (95134);   Surgeon: Landy Saravia MD;  Location: [facility] OR;  Service: Pain Management     HERNIA REPAIR   x3       Family History   Problem Relation Age of Onset    Heart disease Mother     Dementia Father     No Known Problems Sister     No Known Problems Brother     No Known Problems Maternal Aunt     No Known Problems Maternal Uncle     No Known Problems Paternal Aunt     No Known Problems Paternal Uncle     No Known Problems Maternal Grandmother     No Known Problems Maternal Grandfather     No Known Problems Paternal Grandmother     No Known Problems Paternal Grandfather      I have reviewed and agree with the history as documented  E-Cigarette/Vaping    E-Cigarette Use Never User      E-Cigarette/Vaping Substances    Nicotine No     THC No     CBD No     Flavoring No     Other No     Unknown No      Social History     Tobacco Use    Smoking status: Former Smoker     Quit date: 1976     Years since quittin 5    Smokeless tobacco: Never Used   Substance Use Topics    Alcohol use: No    Drug use: Yes     Types: Marijuana     Comment: medical at [facility]       Review of Systems   Constitutional: Positive for fever  HENT: Negative  Eyes: Negative  Respiratory: Negative  Cardiovascular: Negative  Gastrointestinal: Negative  Endocrine: Negative  Genitourinary: Negative  Musculoskeletal: Negative  Skin: Negative  Allergic/Immunologic: Negative  Neurological: Positive for headaches  Hematological: Negative  Psychiatric/Behavioral: Negative  All other systems reviewed and are negative  Physical Exam  Physical Exam  Vitals signs and nursing note reviewed  Constitutional:       Appearance: She is well-developed  HENT:      Head: Normocephalic and atraumatic  Comments: Supple neck ,   Eyes:      Pupils: Pupils are equal, round, and reactive to light  Neck:      Musculoskeletal: Normal range of motion and neck supple  Cardiovascular:      Rate and Rhythm: Normal rate and regular rhythm     Pulmonary:      Effort: Pulmonary effort is normal  Breath sounds: Normal breath sounds  Abdominal:      General: Bowel sounds are normal       Palpations: Abdomen is soft  Musculoskeletal: Normal range of motion  Skin:     General: Skin is warm and dry  Neurological:      General: No focal deficit present  Mental Status: She is alert and oriented to person, place, and time           Vital Signs  ED Triage Vitals [01/01/21 1204]   Temperature Pulse Respirations Blood Pressure SpO2   97 8 °F (36 6 °C) 75 20 129/74 97 %      Temp Source Heart Rate Source Patient Position - Orthostatic VS BP Location FiO2 (%)   Tympanic Monitor Sitting Right arm --      Pain Score       7           Vitals:    01/01/21 1204 01/01/21 1300 01/01/21 1430   BP: 129/74 121/77 134/78   Pulse: 75 82 72   Patient Position - Orthostatic VS: Sitting  Lying         Visual Acuity      ED Medications  Medications   metoclopramide (REGLAN) injection 10 mg (10 mg Intravenous Given 1/1/21 1248)   diphenhydrAMINE (BENADRYL) injection 25 mg (25 mg Intravenous Given 1/1/21 1249)       Diagnostic Studies  Results Reviewed     Procedure Component Value Units Date/Time    Comprehensive metabolic panel [[id number]] Collected: 01/01/21 1246    Lab Status: Final result Specimen: Blood from Arm, Left Updated: 01/01/21 1315     Sodium 136 mmol/L      Potassium 3 6 mmol/L      Chloride 103 mmol/L      CO2 23 mmol/L      ANION GAP 10 mmol/L      BUN 16 mg/dL      Creatinine 0 87 mg/dL      Glucose 92 mg/dL      Calcium 8 8 mg/dL      AST 25 U/L      ALT 24 U/L      Alkaline Phosphatase 91 U/L      Total Protein 7 1 g/dL      Albumin 3 5 g/dL      Total Bilirubin 0 30 mg/dL      eGFR 64 ml/min/1 73sq m     Narrative:      [place] guidelines for Chronic Kidney Disease (CKD):     Stage 1 with normal or high GFR (GFR > 90 mL/min/1 73 square meters)    Stage 2 Mild CKD (GFR = 60-89 mL/min/1 73 square meters)    Stage 3A Moderate CKD (GFR = 45-59 mL/min/1 73 square meters)   Stage 3B Moderate CKD (GFR = 30-44 mL/min/1 73 square meters)    Stage 4 Severe CKD (GFR = 15-29 mL/min/1 73 square meters)    Stage 5 End Stage CKD (GFR <15 mL/min/1 73 square meters)  Note: GFR calculation is accurate only with a steady state creatinine    Magnesium [[id number]]  (Normal) Collected: 01/01/21 1246    Lab Status: Final result Specimen: Blood from Arm, Left Updated: 01/01/21 1315     Magnesium 1 8 mg/dL     Strep A PCR [[id number]]  (Normal) Collected: 01/01/21 1222    Lab Status: Final result Specimen: Throat Updated: 01/01/21 1302     STREP A PCR None Detected    Sedimentation rate, automated [[id number]]  (Normal) Collected: 01/01/21 1246    Lab Status: Final result Specimen: Blood from Arm, Left Updated: 01/01/21 1300     Sed Rate 10 mm/hour     Narrative:      New method- Test performed using  automated Rheology Technology  If following a patient's inflammatory disease during treatment, a new baseline should be established  CBC and differential [[id number]]  (Abnormal) Collected: 01/01/21 1246    Lab Status: Final result Specimen: Blood from Arm, Left Updated: 01/01/21 1259     WBC 3 87 Thousand/uL      RBC 4 99 Million/uL      Hemoglobin 14 7 g/dL      Hematocrit 44 9 %      MCV 90 fL      MCH 29 5 pg      MCHC 32 7 g/dL      RDW 13 9 %      MPV 11 1 fL      Platelets 051 Thousands/uL      nRBC 0 /100 WBCs      Neutrophils Relative 55 %      Immat GRANS % 0 %      Lymphocytes Relative 25 %      Monocytes Relative 19 %      Eosinophils Relative 0 %      Basophils Relative 1 %      Neutrophils Absolute 2 16 Thousands/µL      Immature Grans Absolute 0 01 Thousand/uL      Lymphocytes Absolute 0 95 Thousands/µL      Monocytes Absolute 0 72 Thousand/µL      Eosinophils Absolute 0 01 Thousand/µL      Basophils Absolute 0 02 Thousands/µL     Novel Coronavirus (COVID-19), PCR [facility] [[id number]] Collected: 01/01/21 1222    Lab Status:  In process Specimen: Nasopharyngeal Swab Updated: 01/01/21 1228 CT head without contrast   Final Result by Sofía Lowe MD (01/01 1219)      No acute intracranial abnormality  Workstation performed: [id number]                    Procedures  Procedures         ED Course                                           MDM  Number of Diagnoses or Management Options  Diagnosis management comments: Patient evaluated with labs, imaging  I reviewed the results and discussed them with the patient  Patient discharged with appropriate instructions medications and follow-up  Patient verbalized understanding had no further questions at the time of discharge  Patient had stable vital signs and well-appearing at the time of discharge  Amount and/or Complexity of Data Reviewed  Clinical lab tests: ordered and reviewed  Tests in the radiology section of CPT®: ordered and reviewed  Tests in the medicine section of CPT®: ordered and reviewed    Patient Progress  Patient progress: stable      Disposition  Final diagnoses:   Sore throat   Headache     Time reflects when diagnosis was documented in both MDM as applicable and the Disposition within this note     Time User Action Codes Description Comment    1/1/2021  2:48 PM AnepJudith cardoso Add [J02 9] Sore throat     1/1/2021  2:48 PM AneDonya cabrera Add [R51 9] Headache       ED Disposition     ED Disposition Condition Date/Time Comment    Discharge Stable Fri Jan 1, 2021  2:48 PM May Adia discharge to home/self care              Follow-up Information     Follow up With Specialties Details Why Contact Info Additional Information    Oriana Monzon MD  Schedule an appointment as soon as possible for a visit   Tamiko Beal [phone]       [address] [place] [address] Emergency Department Emergency Medicine  If symptoms worsen [address] [facility] [address]  [phone] [address], [facility] Patient's Medications   Discharge Prescriptions    METOCLOPRAMIDE (REGLAN) 10 MG TABLET    Take 1 tablet (10 mg total) by mouth every 6 (six) hours for 3 days       Start Date: 1/1/2021  End Date: 1/4/2021       Order Dose: 10 mg       Quantity: 10 tablet    Refills: 0     No discharge procedures on file      PDMP Review       Value Time User    PDMP Reviewed  Yes 4/27/2020  2:53 PM Pauline George MD          ED Provider  Electronically Signed by           Cecilio King DO  01/01/21 0941

## 2021-01-04 ENCOUNTER — TELEPHONE (OUTPATIENT)
Dept: FAMILY MEDICINE CLINIC | Facility: CLINIC | Age: 81
End: 2021-01-04

## 2021-01-04 ENCOUNTER — TELEMEDICINE (OUTPATIENT)
Dept: FAMILY MEDICINE CLINIC | Facility: CLINIC | Age: 81
End: 2021-01-04
Payer: MEDICARE

## 2021-01-04 DIAGNOSIS — U07.1 COVID-19: Primary | ICD-10-CM

## 2021-01-04 LAB — SARS-COV-2 RNA SPEC QL NAA+PROBE: DETECTED

## 2021-01-04 PROCEDURE — 99213 OFFICE O/P EST LOW 20 MIN: CPT | Performed by: FAMILY MEDICINE

## 2021-01-04 RX ORDER — ACETAMINOPHEN 325 MG/1
650 TABLET ORAL ONCE AS NEEDED
Status: CANCELLED | OUTPATIENT
Start: 2021-01-05

## 2021-01-04 RX ORDER — ALBUTEROL SULFATE 90 UG/1
3 AEROSOL, METERED RESPIRATORY (INHALATION) ONCE AS NEEDED
Status: CANCELLED | OUTPATIENT
Start: 2021-01-05

## 2021-01-04 RX ORDER — SODIUM CHLORIDE 9 MG/ML
20 INJECTION, SOLUTION INTRAVENOUS ONCE
Status: CANCELLED | OUTPATIENT
Start: 2021-01-05

## 2021-01-04 NOTE — TELEPHONE ENCOUNTER
Patient is a candidate for MAB  She states that she is feeling worse  Has not reached out to her pcp but would like more information on the infusion

## 2021-01-04 NOTE — TELEPHONE ENCOUNTER
----- Message from Raad Sexton PA-C sent at 1/4/2021 10:04 AM EST -----  Regarding: covid  Please consider for monoclonal antibody testing

## 2021-01-04 NOTE — TELEPHONE ENCOUNTER
I called the patient and left her a message  She is scheduled for an infusion at the [facility] at 8:30am on 1/5/2021  We need to give the patient the instructions

## 2021-01-04 NOTE — PROGRESS NOTES
COVID-19 Virtual Visit     Assessment/Plan:    Problem List Items Addressed This Visit     None         Disposition:           Recommend monoclonal antibody as she is greater than 73years old and symptoms are present for just 5 days  Suggest using 2 extra-strength Tylenol 3 times a day for the headache  Follow-up visit 1 or 2 days after receiving the antibody  I have spent 16 minutes directly with the patient  Encounter provider Roxann Baugh MD    Provider located at [address] [place] [id number] [phone]    Recent Visits  No visits were found meeting these conditions  Showing recent visits within past 7 days and meeting all other requirements     Today's Visits  Date Type Provider Dept   01/04/21 Telemedicine Roxann Baugh MD [facility]   01/04/21 Telephone Dayanara Chow [facility]   Showing today's visits and meeting all other requirements     Future Appointments  No visits were found meeting these conditions  Showing future appointments within next 150 days and meeting all other requirements      This virtual check-in was done via [employer] and patient was informed that this is not a secure, HIPAA-compliant platform  She agrees to proceed  Patient agrees to participate in a virtual check in via telephone or video visit instead of presenting to the office to address urgent/immediate medical needs  Patient is aware this is a billable service  After connecting through [facility], the patient was identified by name and date of birth  Tete Harrison was informed that this was a telemedicine visit and that the exam was being conducted confidentially over secure lines  Tete Harrison acknowledged consent and understanding of privacy and security of the telemedicine visit   I informed the patient that I have reviewed her record in Epic and presented the opportunity for her to ask any questions regarding the visit today  The patient agreed to participate  Subjective:   Annita Cheek is a 79 y o  female who is concerned about COVID-19  Date of symptom onset: 12/31/2020    Patient's symptoms include fever, fatigue, nasal congestion, anosmia, loss of taste, cough, shortness of breath (with ambulation), nausea, diarrhea and headache  Patient denies vomiting  Exposure:   Contact with a person who is under investigation (PUI) for or who is positive for COVID-19 within the last 14 days?: Yes    81-year-old female who tested positive on January 1st for Covid 19  Symptoms as above  Exposed to her granddaughter and her boyfriend who both tested positive  Seen in the emergency room on January 1st and treated with IV medication for the headache  Headache is better than it was although still present  Somewhat concerned because her symptoms which are now 11day-old do not seem to be getting better  She is followed by a family physician and is on her usual medications              Lab Results   Component Value Date    SARSCOV2 Detected (A) 01/01/2021     Past Medical History:   Diagnosis Date    Arthritis     DJD- left thumb DJD,most joints    Borderline diabetes     Change in bowel habits     Chronic pain disorder     lumbar    Colon polyp     Constipation     unable to expell the stool completely with BM    Deviated septum     Diverticulosis     DJD (degenerative joint disease)     pt does get injections to the knee with Dr Jeffrey Young GERD (gastroesophageal reflux disease)     Glaucoma     Hearing aid worn     bilateral ears for background noise    Hyperlipidemia     Irregular heart beat     "skips a beat"    Low back pain     Lumbar degenerative disc disease 9/16/2019    Muscle spasm     legs    Peripheral neuropathy     PND (post-nasal drip)     Presence of upper and lower permanent dental bridges     Sciatica     Spinal stenosis     Wears glasses Past Surgical History:   Procedure Laterality Date    BREAST SURGERY  2005    bilateral reduction    COLON SURGERY  2014    resection with colostomy-ruptured "intestine"    COLONOSCOPY N/A 7/11/2016    Procedure: COLONOSCOPY;  Surgeon: Anusha Nam MD;  Location[facility] GI LAB; Service:     COLOSTOMY CLOSURE  2015    COMBINED REDUCTION MAMMAPLASTY W/ LIPOSUCTION      EPIDURAL BLOCK INJECTION N/A 11/15/2019    Procedure: L4 L5 Lumbar Epidural Steroid Injection (42672); Surgeon: Kun Villanueva MD;  Location: [facility] MAIN OR;  Service: Pain Management     EPIDURAL BLOCK INJECTION N/A 3/12/2020    Procedure: L4 L5 Lumbar Epidural Steroid Injection (54278); Surgeon: Kun Villanueva MD;  Location: [facility] MAIN OR;  Service: Pain Management     EPIDURAL BLOCK INJECTION N/A 7/23/2020    Procedure: L4-L5 LUMBAR EPIDURAL STEROID INJECTION (#2) (34645);   Surgeon: Kun Villanueva MD;  Location: [facility] MAIN OR;  Service: Pain Management     HERNIA REPAIR  2016    x3     Current Outpatient Medications   Medication Sig Dispense Refill    ammonium lactate (LAC-HYDRIN) 12 % cream Apply topically as needed for dry skin 385 g 0    aspirin (ASPIRIN LOW DOSE) 81 mg EC tablet 81 mg every morning       atorvastatin (LIPITOR) 40 mg tablet Take 1 tablet (40 mg total) by mouth daily (Patient taking differently: Take 40 mg by mouth every evening ) 90 tablet 3    Coenzyme Q10 (Co Q 10) 100 MG CAPS Take by mouth daily with lunch      COMFORT EZ PEN NEEDLES 31G X 6 MM MISC daily       diltiazem (CARDIZEM CD) 180 mg 24 hr capsule TAKE 1 CAPSULE BY MOUTH EVERY DAY 90 capsule 1    diltiazem (TIAZAC) 180 MG 24 hr capsule Take 1 capsule (180 mg total) by mouth daily (Patient taking differently: Take 180 mg by mouth daily after lunch ) 90 capsule 3    GaviLyte-N with Flavor Pack 420 g solution once Take as directed      liraglutide (VICTOZA) injection Inject 0 6 mg under the skin daily after breakfast       metoclopramide (REGLAN) 10 mg tablet Take 1 tablet (10 mg total) by mouth every 6 (six) hours for 3 days 10 tablet 0    montelukast (SINGULAIR) 10 mg tablet 10 mg every morning       omeprazole (PriLOSEC) 40 MG capsule 40 mg every morning       ONE TOUCH ULTRA TEST test strip daily as needed   1    PARoxetine (PAXIL) 10 mg tablet 10 mg every morning       Travoprost (TRAVATAN OP) Apply to eye daily at bedtime       No current facility-administered medications for this visit  No Known Allergies    Review of Systems   Constitutional: Positive for fatigue and fever  HENT: Positive for congestion  Respiratory: Positive for cough and shortness of breath (with ambulation)  Gastrointestinal: Positive for diarrhea and nausea  Negative for vomiting  Neurological: Positive for headaches  Objective: There were no vitals filed for this visit  Physical Exam    Appears well  Good looking 67-year-old lady  Some  cough noted but no shortness of breath at rest    VIRTUAL VISIT DISCLAIMER    Monda Schilder acknowledges that she has consented to an online visit or consultation  She understands that the online visit is based solely on information provided by her, and that, in the absence of a face-to-face physical evaluation by the physician, the diagnosis she receives is both limited and provisional in terms of accuracy and completeness  This is not intended to replace a full medical face-to-face evaluation by the physician  Monda Schilder understands and accepts these terms

## 2021-01-05 ENCOUNTER — HOSPITAL ENCOUNTER (OUTPATIENT)
Dept: INFUSION CENTER | Facility: HOSPITAL | Age: 81
Discharge: HOME | End: 2021-01-05
Payer: MEDICARE

## 2021-01-05 ENCOUNTER — TELEPHONE (OUTPATIENT)
Dept: INFUSION CENTER | Facility: CLINIC | Age: 81
End: 2021-01-05

## 2021-01-05 ENCOUNTER — TELEMEDICINE (OUTPATIENT)
Dept: FAMILY MEDICINE CLINIC | Facility: CLINIC | Age: 81
End: 2021-01-05
Payer: MEDICARE

## 2021-01-05 ENCOUNTER — HOSPITAL ENCOUNTER (OUTPATIENT)
Dept: INFUSION CENTER | Facility: HOSPITAL | Age: 81
Discharge: HOME | End: 2021-01-05

## 2021-01-05 VITALS
WEIGHT: 190 LBS | BODY MASS INDEX: 32.61 KG/M2 | RESPIRATION RATE: 18 BRPM | TEMPERATURE: 98.9 F | HEART RATE: 59 BPM | OXYGEN SATURATION: 95 % | DIASTOLIC BLOOD PRESSURE: 87 MMHG | SYSTOLIC BLOOD PRESSURE: 168 MMHG

## 2021-01-05 DIAGNOSIS — U07.1 COVID-19: Primary | ICD-10-CM

## 2021-01-05 PROCEDURE — 99441 PR PHYS/QHP TELEPHONE EVALUATION 5-10 MIN: CPT | Performed by: FAMILY MEDICINE

## 2021-01-05 PROCEDURE — M0239 BAMLANIVIMAB-XXXX INFUSION: HCPCS | Performed by: FAMILY MEDICINE

## 2021-01-05 RX ORDER — ACETAMINOPHEN 325 MG/1
650 TABLET ORAL ONCE AS NEEDED
Status: DISCONTINUED | OUTPATIENT
Start: 2021-01-05 | End: 2021-01-08 | Stop reason: HOSPADM

## 2021-01-05 RX ORDER — SODIUM CHLORIDE 9 MG/ML
20 INJECTION, SOLUTION INTRAVENOUS ONCE
Status: CANCELLED | OUTPATIENT
Start: 2021-01-05

## 2021-01-05 RX ORDER — ACETAMINOPHEN 325 MG/1
650 TABLET ORAL ONCE AS NEEDED
Status: CANCELLED | OUTPATIENT
Start: 2021-01-05

## 2021-01-05 RX ORDER — ALBUTEROL SULFATE 90 UG/1
3 AEROSOL, METERED RESPIRATORY (INHALATION) ONCE AS NEEDED
Status: CANCELLED | OUTPATIENT
Start: 2021-01-05

## 2021-01-05 RX ORDER — ALBUTEROL SULFATE 90 UG/1
3 AEROSOL, METERED RESPIRATORY (INHALATION) ONCE AS NEEDED
Status: DISCONTINUED | OUTPATIENT
Start: 2021-01-05 | End: 2021-01-08 | Stop reason: HOSPADM

## 2021-01-05 RX ORDER — SODIUM CHLORIDE 9 MG/ML
20 INJECTION, SOLUTION INTRAVENOUS ONCE
Status: COMPLETED | OUTPATIENT
Start: 2021-01-05 | End: 2021-01-05

## 2021-01-05 RX ADMIN — SODIUM CHLORIDE 700 MG: 9 INJECTION, SOLUTION INTRAVENOUS at 10:41

## 2021-01-05 RX ADMIN — SODIUM CHLORIDE 20 ML/HR: 0.9 INJECTION, SOLUTION INTRAVENOUS at 10:02

## 2021-01-05 NOTE — TELEPHONE ENCOUNTER
Patient called infusion at 815 am stating she has appointment for Bamlanivimab infusion at [place] infusion at 21  but did not have a ride so she called the ambulance  Ambulance would not take her to [facility], they dropped her off at the ED in [place]  Patient has no transportation to [facility] today  Arranged for patient to be treated at [place] today    Patient was very appreciative

## 2021-01-05 NOTE — PROGRESS NOTES
Pt c/o headache, offered tylenol but is refusing at this time, states that doesn't do anything for her

## 2021-01-05 NOTE — PROGRESS NOTES
Infusion half way complete  Pt has no s/s of adverse reaction  Pt c/o headache which she had prior to starting the infusion  No new complaints at this time  Will continue to monitor

## 2021-01-05 NOTE — PROGRESS NOTES
Pt given EUA and consented to treatment  No questions at this time  Pharmacy called to mix medication at this time

## 2021-01-05 NOTE — PROGRESS NOTES
COVID-19 Virtual Visit     Assessment/Plan:    Problem List Items Addressed This Visit     COVID-19 - Primary         Disposition:         Patient to have monoclonal antibody infusion done today at [address] [place] [address]  She is going to follow-up with her PCP out of [facility] tomorrow  Patient is a candidate for Bamlanivimab  They were counseled in regards to risks, benefits, and side effects of this infusion  Possible side effects of bamlanivimab are: Allergic reactions   Allergic reactions can happen during and after infusion with bamlanivimab which include:    Fever, chills, nausea, headache, shortness of breath, low blood pressure, wheezing, swelling of your lips, face, or throat, rash including hives, itching, muscle aches, and dizziness  The side effects of getting any medicine by vein may include brief pain, bleeding, bruising of the skin, soreness, swelling, and possible infection at the infusion site  These are not all the possible side effects of bamlanivimab  Not a lot of people have been given bamlanivimab  Serious and unexpected side effects may happen  Tapan Hayes is still being studied so it is possible that all of the risks are not known at this time  Please note that this drug was approved under the Emergency Use Authorization of the FDA and has not gone through the full, formal FDA approval process    It is possible that bamlanivimab could interfere with your body's own ability to fight off a future infection of SARS-CoV-2  Similarly, bamlanivimab may reduce your bodys immune response to a vaccine for SARS-CoV-2  Specific studies have not been conducted to address these possible risks  Currently there is no data or safety or efficacy of COVID-19 vaccination in persons who received monoclonal antibodies as part of COVID-19 treatment   Treatment should be deferred for at least 90 days to avoid interference of the treatment with vaccine-induced immune responses (this is based on estimated half-life of therapies and evidence suggesting reinfection is uncommon within 90 days of initial infection)  The patient consents to proceed with bamlanivimab infusion  *[email or web address]  [email or web address]/[email or web address]-[email or web address]-factsheet-hcp  pdf    I have spent 5 minutes directly with the patient  Greater than 50% of this time was spent in counseling/coordination of care regarding: risks and benefits of treatment options  Encounter provider Dain Reeves DO    Provider located at  [place] [address]  [address] [address]    Recent Visits  No visits were found meeting these conditions  Showing recent visits within past 7 days and meeting all other requirements     Today's Visits  Date Type Provider Dept   01/05/21 [address] [facility], [facility][note 3] today's visits and meeting all other requirements     Future Appointments  No visits were found meeting these conditions  Showing future appointments within next 150 days and meeting all other requirements        Patient agrees to participate in a virtual check in via telephone or video visit instead of presenting to the office to address urgent/immediate medical needs  Patient is aware this is a billable service  After connecting through Telephone, the patient was identified by name and date of birth  Petrona Ham was informed that this was a telemedicine visit and that the exam was being conducted confidentially over secure lines  My office door was closed  No one else was in the room  Petrona Ham acknowledged consent and understanding of privacy and security of the telemedicine visit  I informed the patient that I have reviewed her record in Epic and presented the opportunity for her to ask any questions regarding the visit today  The patient agreed to participate      It was my intent to perform this visit via video technology but the patient was not able to do a video connection so the visit was completed via audio telephone only  Subjective:   Ralph Linder is a 79 y o  female who has been screened for COVID-19  Symptom change since last report: unchanged  Date of symptom onset: 12/31/2020    Patient's symptoms include fatigue, nasal congestion and cough  Elliott Jake has been staying home and has isolated themselves in her home  She is taking care to not share personal items and is cleaning all surfaces that are touched often, like counters, tabletops, and doorknobs using household cleaning sprays or wipes  She is wearing a mask when she leaves her room  Lab Results   Component Value Date    SARSCOV2 Detected (A) 01/01/2021     Past Medical History:   Diagnosis Date    Arthritis     DJD- left thumb DJD,most joints    Borderline diabetes     Change in bowel habits     Chronic pain disorder     lumbar    Colon polyp     Constipation     unable to expell the stool completely with BM    Deviated septum     Diverticulosis     DJD (degenerative joint disease)     pt does get injections to the knee with Dr Lora Drummond GERD (gastroesophageal reflux disease)     Glaucoma     Hearing aid worn     bilateral ears for background noise    Hyperlipidemia     Irregular heart beat     "skips a beat"    Low back pain     Lumbar degenerative disc disease 9/16/2019    Muscle spasm     legs    Peripheral neuropathy     PND (post-nasal drip)     Presence of upper and lower permanent dental bridges     Sciatica     Spinal stenosis     Wears glasses      Past Surgical History:   Procedure Laterality Date    BREAST SURGERY  2005    bilateral reduction    COLON SURGERY  2014    resection with colostomy-ruptured "intestine"    COLONOSCOPY N/A 7/11/2016    Procedure: COLONOSCOPY;  Surgeon: Justo Hylton MD;  Location: [place] [address] GI LAB;   Service:     COLOSTOMY CLOSURE  2015    COMBINED REDUCTION MAMMAPLASTY W/ LIPOSUCTION      EPIDURAL BLOCK INJECTION N/A 11/15/2019    Procedure: L4 L5 Lumbar Epidural Steroid Injection (64322); Surgeon: Oscar Carmichael MD;  Location: [facility] MAIN OR;  Service: Pain Management     EPIDURAL BLOCK INJECTION N/A 3/12/2020    Procedure: L4 L5 Lumbar Epidural Steroid Injection (22257); Surgeon: Oscar Carmichael MD;  Location: [facility] MAIN OR;  Service: Pain Management     EPIDURAL BLOCK INJECTION N/A 7/23/2020    Procedure: L4-L5 LUMBAR EPIDURAL STEROID INJECTION (#2) (40092); Surgeon: Oscar Carmichael MD;  Location: [facility] MAIN OR;  Service: Pain Management     HERNIA REPAIR  2016    x3     Current Outpatient Medications   Medication Sig Dispense Refill    ammonium lactate (LAC-HYDRIN) 12 % cream Apply topically as needed for dry skin 385 g 0    aspirin (ASPIRIN LOW DOSE) 81 mg EC tablet 81 mg every morning       atorvastatin (LIPITOR) 40 mg tablet Take 1 tablet (40 mg total) by mouth daily (Patient taking differently: Take 40 mg by mouth every evening ) 90 tablet 3    Coenzyme Q10 (Co Q 10) 100 MG CAPS Take by mouth daily with lunch      COMFORT EZ PEN NEEDLES 31G X 6 MM MISC daily       diltiazem (CARDIZEM CD) 180 mg 24 hr capsule TAKE 1 CAPSULE BY MOUTH EVERY DAY 90 capsule 1    diltiazem (TIAZAC) 180 MG 24 hr capsule Take 1 capsule (180 mg total) by mouth daily (Patient taking differently: Take 180 mg by mouth daily after lunch ) 90 capsule 3    GaviLyte-N with Flavor Pack 420 g solution once Take as directed      liraglutide (VICTOZA) injection Inject 0 6 mg under the skin daily after breakfast       montelukast (SINGULAIR) 10 mg tablet 10 mg every morning       omeprazole (PriLOSEC) 40 MG capsule 40 mg every morning       ONE TOUCH ULTRA TEST test strip daily as needed   1    PARoxetine (PAXIL) 10 mg tablet 10 mg every morning       Travoprost (TRAVATAN OP) Apply to eye daily at bedtime       No current facility-administered medications for this visit        No Known Allergies    Review of Systems   Constitutional: Positive for fatigue  HENT: Positive for congestion  Respiratory: Positive for cough  VIRTUAL VISIT DISCLAIMER    Lara Lam acknowledges that she has consented to an online visit or consultation  She understands that the online visit is based solely on information provided by her, and that, in the absence of a face-to-face physical evaluation by the physician, the diagnosis she receives is both limited and provisional in terms of accuracy and completeness  This is not intended to replace a full medical face-to-face evaluation by the physician  Lara Lam understands and accepts these terms

## 2021-01-05 NOTE — PROGRESS NOTES
Infusion finished  No s/s of adverse reaction  Pt has no new complaints at this time  Will continue to monitor

## 2021-01-19 ENCOUNTER — TELEPHONE (OUTPATIENT)
Dept: CARDIOLOGY CLINIC | Facility: CLINIC | Age: 81
End: 2021-01-19

## 2021-01-19 NOTE — TELEPHONE ENCOUNTER
Pt called stated, at night she has been having these palpitations and skips of heart rate followed by feeling of passing out but does not pass out  Pt would like to know what to do

## 2021-01-21 NOTE — TELEPHONE ENCOUNTER
Pt did call back and I notifies her of what Dr Nestor Saavedra advised   She will wait to see him in feb

## 2021-02-02 PROBLEM — K63.5 COLON POLYPS: Status: ACTIVE | Noted: 2021-02-02

## 2021-02-12 DIAGNOSIS — Z23 ENCOUNTER FOR IMMUNIZATION: ICD-10-CM

## 2021-02-16 ENCOUNTER — OFFICE VISIT (OUTPATIENT)
Dept: GASTROENTEROLOGY | Facility: CLINIC | Age: 81
End: 2021-02-16
Payer: MEDICARE

## 2021-02-16 ENCOUNTER — LAB (OUTPATIENT)
Dept: LAB | Facility: CLINIC | Age: 81
End: 2021-02-16
Payer: MEDICARE

## 2021-02-16 VITALS
WEIGHT: 180 LBS | HEIGHT: 64 IN | BODY MASS INDEX: 30.73 KG/M2 | DIASTOLIC BLOOD PRESSURE: 76 MMHG | SYSTOLIC BLOOD PRESSURE: 116 MMHG | HEART RATE: 91 BPM

## 2021-02-16 DIAGNOSIS — R10.12 LEFT UPPER QUADRANT ABDOMINAL PAIN: Primary | ICD-10-CM

## 2021-02-16 DIAGNOSIS — Z87.11 HISTORY OF PEPTIC ULCER DISEASE: ICD-10-CM

## 2021-02-16 DIAGNOSIS — R19.07 GENERALIZED SWELLING, MASS, OR LUMP OF ABDOMEN OR PELVIS: ICD-10-CM

## 2021-02-16 DIAGNOSIS — Z86.010 HISTORY OF COLON POLYPS: ICD-10-CM

## 2021-02-16 DIAGNOSIS — K21.9 GASTROESOPHAGEAL REFLUX DISEASE WITHOUT ESOPHAGITIS: ICD-10-CM

## 2021-02-16 DIAGNOSIS — R10.12 LEFT UPPER QUADRANT ABDOMINAL PAIN: ICD-10-CM

## 2021-02-16 DIAGNOSIS — K22.70 BARRETT'S ESOPHAGUS WITHOUT DYSPLASIA: ICD-10-CM

## 2021-02-16 DIAGNOSIS — K59.00 CONSTIPATION, UNSPECIFIED CONSTIPATION TYPE: ICD-10-CM

## 2021-02-16 LAB
ANION GAP SERPL CALCULATED.3IONS-SCNC: 3 MMOL/L (ref 4–13)
BUN SERPL-MCNC: 22 MG/DL (ref 5–25)
CALCIUM SERPL-MCNC: 9.7 MG/DL (ref 8.3–10.1)
CHLORIDE SERPL-SCNC: 112 MMOL/L (ref 100–108)
CO2 SERPL-SCNC: 28 MMOL/L (ref 21–32)
CREAT SERPL-MCNC: 0.64 MG/DL (ref 0.6–1.3)
GFR SERPL CREATININE-BSD FRML MDRD: 85 ML/MIN/1.73SQ M
GLUCOSE SERPL-MCNC: 97 MG/DL (ref 65–140)
POTASSIUM SERPL-SCNC: 4.2 MMOL/L (ref 3.5–5.3)
SODIUM SERPL-SCNC: 143 MMOL/L (ref 136–145)

## 2021-02-16 PROCEDURE — 80048 BASIC METABOLIC PNL TOTAL CA: CPT

## 2021-02-16 PROCEDURE — 36415 COLL VENOUS BLD VENIPUNCTURE: CPT

## 2021-02-16 PROCEDURE — 99213 OFFICE O/P EST LOW 20 MIN: CPT | Performed by: NURSE PRACTITIONER

## 2021-02-16 RX ORDER — OMEPRAZOLE 40 MG/1
40 CAPSULE, DELAYED RELEASE ORAL EVERY MORNING
Qty: 90 CAPSULE | Refills: 0 | Status: SHIPPED | OUTPATIENT
Start: 2021-02-16

## 2021-02-16 NOTE — PROGRESS NOTES
[facility] - Outpatient Follow-up Note  Ralph Linder 79 y o  female MRN: [id number]  Encounter: [id number]          ASSESSMENT AND PLAN:      1  Left upper quadrant abdominal pain  -     CT abdomen pelvis w contrast; Future; Expected date: 02/16/2021  -     Basic metabolic panel; Future    2  Generalized swelling, mass, or lump of abdomen or pelvis  -     CT abdomen pelvis w contrast; Future; Expected date: 02/16/2021  -     Basic metabolic panel; Future    LUQ 4-5 cm superficial mass and tenderness may be secondary to lipoma or superficial swelling, we will obtain CT abdomen/pelvis for further characterization  She reports soreness at the site with palpation and it is more noticeable at night or if she bends over  There is no rebound tenderness or guarding  Pt hasn't had any abdominal imaging since 2016  BMP ordered to evaluate kidney function prior to imaging with contrast       3  Gastroesophageal reflux disease without esophagitis  -     omeprazole (PriLOSEC) 40 MG capsule; Take 1 capsule (40 mg total) by mouth every morning    4  History of peptic ulcer disease    5  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  Stable on current regimen  Continue Prilosec 40mg daily  Anti reflux diet and lifestyle modifications  Next EGD due in August 2021 for surveillance of intestinal metaplasia in EG junction  6  History of colon polyps    7  Constipation   Up to date on screening colonoscopy  Next colonoscopy due in August 2025  She will increase her fiber to see if that helps her constipation and we will otherwise get imaging of her colon w/ CT scan to evaluate for any other abnormalities      Follow up in the office as needed depending on CT results or if symptoms do not improve     ______________________________________________________________________    SUBJECTIVE:  This is a 71-year-old female with history of complicated diverticulitis s/p sigmoid resection (2015), abdominal hernia repair x3 (2016), colon polyps, PUD, GERD, and barboza's esophagus without dysplasia presenting for follow-up due to a lump on her left abdomen adjascent to her umbilicus which is tender to palpation  She wants to get it checked out prior to leaving for [place] to visit her sister next month  Symptoms started 2-3 months ago, she only has abdominal pain when she bends down or with palpation  It's generally worse at night  She had COVID in the beginning of January and lost some weight due to loss of taste and smell  She has also been having more difficulty moving her bowels, she used to move them like clockwork  Now, she doesn't feel the urge to move her bowels and she feels like she doesn't empty completely  She eats a healthy diet, she is active  Denies NSAID use  She takes medical marijuana for back pain and arthritis pain  Currently taking Omeprazole 40mg daily  Denies reflux symptoms if she takes her medication  No dysphagia/odynophagia, nausea/vomiting  Last EGD and colonoscopy was done 08/31/2020 which showed evidence of prior ulceration of the antrum with erythema of the antrum and inflammation of the EG junction with irregularity  Antral biopsy consistent with mild chronic inactive gastritis with reactive changes, negative for intestinal metaplasia and H pylori  Irregular EG junction biopsy showed benign squamocolumnar junctional mucosa with intestinal metaplasia, chronic and focal acute inflammation  Negative for dysplasia or carcinoma  Eosinophils are fewer than 2 cells per high power field  Colonoscopy showed 1 small polyp which was removed, biopsy showed serrated polyp, and evidence of partial sigmoidectomy  REVIEW OF SYSTEMS IS OTHERWISE NEGATIVE        Historical Information   Past Medical History:   Diagnosis Date    Arthritis     DJD- left thumb DJD,most joints    Borderline diabetes     Change in bowel habits     Chronic pain disorder     lumbar    Colon polyp     Constipation     unable to expell the stool completely with BM    Deviated septum     Diverticulosis     DJD (degenerative joint disease)     pt does get injections to the knee with Dr Simone Lockhart GERD (gastroesophageal reflux disease)     Glaucoma     Hearing aid worn     bilateral ears for background noise    Hyperlipidemia     Irregular heart beat     "skips a beat"    Low back pain     Lumbar degenerative disc disease 2019    Muscle spasm     legs    Peripheral neuropathy     PND (post-nasal drip)     Presence of upper and lower permanent dental bridges     Sciatica     Spinal stenosis     Wears glasses      Past Surgical History:   Procedure Laterality Date    BREAST SURGERY      bilateral reduction    COLON SURGERY      resection with colostomy-ruptured "intestine"    COLONOSCOPY N/A 2016    Procedure: COLONOSCOPY;  Surgeon: Soctty Brar MD;  Location: [facility] GI LAB; Service:     COLOSTOMY CLOSURE      COMBINED REDUCTION MAMMAPLASTY W/ LIPOSUCTION      EPIDURAL BLOCK INJECTION N/A 11/15/2019    Procedure: L4 L5 Lumbar Epidural Steroid Injection (24657); Surgeon: Lesley Chavez MD;  Location: [facility] MAIN OR;  Service: Pain Management     EPIDURAL BLOCK INJECTION N/A 3/12/2020    Procedure: L4 L5 Lumbar Epidural Steroid Injection (68226); Surgeon: Lesley Chavez MD;  Location: [facility] MAIN OR;  Service: Pain Management     EPIDURAL BLOCK INJECTION N/A 2020    Procedure: L4-L5 LUMBAR EPIDURAL STEROID INJECTION (#2) (85305);   Surgeon: Lesley Chavez MD;  Location: [facility] OR;  Service: Pain Management     HERNIA REPAIR  2016    x3     Social History   Social History     Substance and Sexual Activity   Alcohol Use No     Social History     Substance and Sexual Activity   Drug Use Yes    Types: Marijuana    Comment: medical at [facility]     Social History     Tobacco Use   Smoking Status Former Smoker    Quit date: 1976    Years since quittin 6   Smokeless Tobacco Never Used Family History   Problem Relation Age of Onset    Heart disease Mother     Dementia Father     No Known Problems Sister     No Known Problems Brother     No Known Problems Maternal Aunt     No Known Problems Maternal Uncle     No Known Problems Paternal Aunt     No Known Problems Paternal Uncle     No Known Problems Maternal Grandmother     No Known Problems Maternal Grandfather     No Known Problems Paternal Grandmother     No Known Problems Paternal Grandfather        Meds/Allergies       Current Outpatient Medications:     ammonium lactate (LAC-HYDRIN) 12 % cream    aspirin (ASPIRIN LOW DOSE) 81 mg EC tablet    atorvastatin (LIPITOR) 40 mg tablet    Coenzyme Q10 (Co Q 10) 100 MG CAPS    COMFORT EZ PEN NEEDLES 31G X 6 MM MISC    diltiazem (TIAZAC) 180 MG 24 hr capsule    liraglutide (VICTOZA) injection    montelukast (SINGULAIR) 10 mg tablet    omeprazole (PriLOSEC) 40 MG capsule    ONE TOUCH ULTRA TEST test strip    PARoxetine (PAXIL) 10 mg tablet    Travoprost (TRAVATAN OP)    diltiazem (CARDIZEM CD) 180 mg 24 hr capsule    GaviLyte-N with Flavor Pack 420 g solution    No Known Allergies        Objective     Blood pressure 116/76, pulse 91, height 5' 4" (1 626 m), weight 81 6 kg (180 lb)  Body mass index is 30 9 kg/m²  PHYSICAL EXAM:      General Appearance:   Alert, cooperative, no distress   HEENT:   Normocephalic, atraumatic, anicteric  Neck:  Supple, symmetrical, trachea midline   Lungs:   Clear to auscultation bilaterally; no rales, rhonchi or wheezing; respirations unlabored    Heart[de-identified]   Regular rate and rhythm; no murmur     Abdomen:   Soft, non-distended; normal bowel sounds; no organomegaly; there is a 4-5cm in diameter superficial mass to the left of the umbilicus, it is non-reducible, and slightly tender to palpation; no rebound tenderness or guarding present   Genitalia:   Deferred    Rectal:   Deferred    Extremities:  No cyanosis, clubbing or edema    Skin:  No jaundice, rashes, or lesions    Lymph nodes:  No palpable cervical lymphadenopathy        Lab Results:   No visits with results within 1 Day(s) from this visit  Latest known visit with results is:   Admission on 01/01/2021, Discharged on 01/01/2021   Component Date Value    STREP A PCR 01/01/2021 None Detected     SARS-CoV-2  01/01/2021 Detected*    WBC 01/01/2021 3 87*    RBC 01/01/2021 4 99     Hemoglobin 01/01/2021 14 7     Hematocrit 01/01/2021 44 9     MCV 01/01/2021 90     MCH 01/01/2021 29 5     MCHC 01/01/2021 32 7     RDW 01/01/2021 13 9     MPV 01/01/2021 11 1     Platelets 13/53/6045 195     nRBC 01/01/2021 0     Neutrophils Relative 01/01/2021 55     Immat GRANS % 01/01/2021 0     Lymphocytes Relative 01/01/2021 25     Monocytes Relative 01/01/2021 19*    Eosinophils Relative 01/01/2021 0     Basophils Relative 01/01/2021 1     Neutrophils Absolute 01/01/2021 2 16     Immature Grans Absolute 01/01/2021 0 01     Lymphocytes Absolute 01/01/2021 0 95     Monocytes Absolute 01/01/2021 0 72     Eosinophils Absolute 01/01/2021 0 01     Basophils Absolute 01/01/2021 0 02     Sodium 01/01/2021 136     Potassium 01/01/2021 3 6     Chloride 01/01/2021 103     CO2 01/01/2021 23     ANION GAP 01/01/2021 10     BUN 01/01/2021 16     Creatinine 01/01/2021 0 87     Glucose 01/01/2021 92     Calcium 01/01/2021 8 8     AST 01/01/2021 25     ALT 01/01/2021 24     Alkaline Phosphatase 01/01/2021 91     Total Protein 01/01/2021 7 1     Albumin 01/01/2021 3 5     Total Bilirubin 01/01/2021 0 30     eGFR 01/01/2021 64     Magnesium 01/01/2021 1 8     Sed Rate 01/01/2021 10          Radiology Results:   No results found

## 2021-02-23 ENCOUNTER — HOSPITAL ENCOUNTER (OUTPATIENT)
Dept: RADIOLOGY | Facility: HOSPITAL | Age: 81
Discharge: HOME | End: 2021-02-23
Payer: MEDICARE

## 2021-02-23 DIAGNOSIS — R10.12 LEFT UPPER QUADRANT ABDOMINAL PAIN: ICD-10-CM

## 2021-02-23 DIAGNOSIS — R19.07 GENERALIZED SWELLING, MASS, OR LUMP OF ABDOMEN OR PELVIS: ICD-10-CM

## 2021-02-23 PROCEDURE — G1004 CDSM NDSC: HCPCS

## 2021-02-23 PROCEDURE — 74177 CT ABD & PELVIS W/CONTRAST: CPT

## 2021-02-23 RX ADMIN — IOHEXOL 50 ML: 240 INJECTION, SOLUTION INTRATHECAL; INTRAVASCULAR; INTRAVENOUS; ORAL at 14:33

## 2021-02-23 RX ADMIN — IOHEXOL 100 ML: 350 INJECTION, SOLUTION INTRAVENOUS at 14:33

## 2021-02-26 ENCOUNTER — OFFICE VISIT (OUTPATIENT)
Dept: CARDIOLOGY CLINIC | Facility: CLINIC | Age: 81
End: 2021-02-26
Payer: MEDICARE

## 2021-02-26 VITALS
HEIGHT: 64 IN | HEART RATE: 72 BPM | TEMPERATURE: 99.2 F | DIASTOLIC BLOOD PRESSURE: 80 MMHG | BODY MASS INDEX: 31.41 KG/M2 | WEIGHT: 184 LBS | SYSTOLIC BLOOD PRESSURE: 118 MMHG | OXYGEN SATURATION: 98 %

## 2021-02-26 DIAGNOSIS — I10 BENIGN ESSENTIAL HYPERTENSION: ICD-10-CM

## 2021-02-26 DIAGNOSIS — I49.9 IRREGULAR HEART BEAT: Primary | ICD-10-CM

## 2021-02-26 DIAGNOSIS — E78.5 DYSLIPIDEMIA: ICD-10-CM

## 2021-02-26 DIAGNOSIS — R00.2 PALPITATIONS: ICD-10-CM

## 2021-02-26 PROCEDURE — 99214 OFFICE O/P EST MOD 30 MIN: CPT | Performed by: INTERNAL MEDICINE

## 2021-02-26 NOTE — PROGRESS NOTES
Cardiology   Attila Schmidt DO, Mando Cloud MD, Felecia Duggan MD, Yassine Hammond MD, [facility] [place]  -------------------------------------------------------------------  [facility] [place] [facility]  [address], [address] [facility] [place] [address], [address] [place] [address] Avenue  [phone]    Cardiology Follow Up  Juanpablo Torres  1940  [id number]          Assessment/Plan:    1  Irregular heart beat    2  Palpitations    3  Dyslipidemia    4  Benign essential hypertension      - Patient with palpitations associated with lightheadedness that is different than prior symptoms  - Will obtain 48 hour holter monitor  She will be travelling to [place] in a few weeks  If she cannot do prior to leaving, she should speak with her cardiologist in [place] about having one done there  - Continue Cardizem  Interval History:     Juanpablo Torres is 79 y o  female here for evaluation of palpitations  She has been feeling palpitations which she describes as a pounding heart beat associated with lightheadedness  Has been occurring at night  Does not happen with exertion  First occurred in October  Last occurred over a week ago  No chest pain or shortness of breath  She reports good adherence with cardizem      Previously had stress test and echocardiogram in [place] which was normal        Past Medical History:   Diagnosis Date    Arthritis     DJD- left thumb DJD,most joints    Borderline diabetes     Change in bowel habits     Chronic pain disorder     lumbar    Colon polyp     Constipation     unable to expell the stool completely with BM    Deviated septum     Diverticulosis     DJD (degenerative joint disease)     pt does get injections to the knee with Dr Steph Mendoza GERD (gastroesophageal reflux disease)     Glaucoma     Hearing aid worn     bilateral ears for background noise    Hyperlipidemia     Irregular heart beat     "skips a beat"    Low back pain  Lumbar degenerative disc disease 2019    Muscle spasm     legs    Peripheral neuropathy     PND (post-nasal drip)     Presence of upper and lower permanent dental bridges     Sciatica     Spinal stenosis     Wears glasses      Social History     Socioeconomic History    Marital status:      Spouse name: Not on file    Number of children: Not on file    Years of education: Not on file    Highest education level: Not on file   Occupational History    Not on file   Social Needs    Financial resource strain: Not on file    Food insecurity     Worry: Not on file     Inability: Not on file    Transportation needs     Medical: Not on file     Non-medical: Not on file   Tobacco Use    Smoking status: Former Smoker     Quit date: 1976     Years since quittin 7    Smokeless tobacco: Never Used   Substance and Sexual Activity    Alcohol use: No    Drug use: Yes     Types: Marijuana     Comment: medical at [address] Sexual activity: Not on file   Lifestyle    Physical activity     Days per week: Not on file     Minutes per session: Not on file    Stress: Not on file   Relationships    Social connections     Talks on phone: Not on file     Gets together: Not on file     Attends Synagogue service: Not on file     Active member of club or organization: Not on file     Attends meetings of clubs or organizations: Not on file     Relationship status: Not on file    Intimate partner violence     Fear of current or ex partner: Not on file     Emotionally abused: Not on file     Physically abused: Not on file     Forced sexual activity: Not on file   Other Topics Concern    Not on file   Social History Narrative    Not on file      Family History   Problem Relation Age of Onset    Heart disease Mother     Dementia Father     No Known Problems Sister     No Known Problems Brother     No Known Problems Maternal Aunt     No Known Problems Maternal Uncle     No Known Problems Paternal Aunt     No Known Problems Paternal Uncle     No Known Problems Maternal Grandmother     No Known Problems Maternal Grandfather     No Known Problems Paternal Grandmother     No Known Problems Paternal Grandfather      Past Surgical History:   Procedure Laterality Date    BREAST SURGERY  2005    bilateral reduction    COLON SURGERY  2014    resection with colostomy-ruptured "intestine"    COLONOSCOPY N/A 7/11/2016    Procedure: COLONOSCOPY;  Surgeon: Macy Layne MD;  Location: [facility] GI LAB; Service:     COLOSTOMY CLOSURE  2015    COMBINED REDUCTION MAMMAPLASTY W/ LIPOSUCTION      EPIDURAL BLOCK INJECTION N/A 11/15/2019    Procedure: L4 L5 Lumbar Epidural Steroid Injection (38227); Surgeon: Marijean Lesches, MD;  Location: [facility] MAIN OR;  Service: Pain Management     EPIDURAL BLOCK INJECTION N/A 3/12/2020    Procedure: L4 L5 Lumbar Epidural Steroid Injection (55812); Surgeon: Marijean Lesches, MD;  Location: [facility] MAIN OR;  Service: Pain Management     EPIDURAL BLOCK INJECTION N/A 7/23/2020    Procedure: L4-L5 LUMBAR EPIDURAL STEROID INJECTION (#2) (58166);   Surgeon: Marijean Lesches, MD;  Location: [facility] MAIN OR;  Service: Pain Management     HERNIA REPAIR  2016    x3       Current Outpatient Medications:     aspirin (ASPIRIN LOW DOSE) 81 mg EC tablet, 81 mg every morning , Disp: , Rfl:     atorvastatin (LIPITOR) 40 mg tablet, Take 1 tablet (40 mg total) by mouth daily (Patient taking differently: Take 40 mg by mouth every evening ), Disp: 90 tablet, Rfl: 3    Coenzyme Q10 (Co Q 10) 100 MG CAPS, Take by mouth daily with lunch, Disp: , Rfl:     COMFORT EZ PEN NEEDLES 31G X 6 MM MISC, daily , Disp: , Rfl:     diltiazem (TIAZAC) 180 MG 24 hr capsule, Take 1 capsule (180 mg total) by mouth daily (Patient taking differently: Take 180 mg by mouth daily after lunch ), Disp: 90 capsule, Rfl: 3    liraglutide (VICTOZA) injection, Inject 0 6 mg under the skin daily after breakfast , Disp: , Rfl:    omeprazole (PriLOSEC) 40 MG capsule, Take 1 capsule (40 mg total) by mouth every morning, Disp: 90 capsule, Rfl: 0    ONE TOUCH ULTRA TEST test strip, daily as needed , Disp: , Rfl: 1    PARoxetine (PAXIL) 10 mg tablet, 10 mg every morning , Disp: , Rfl:     Travoprost (TRAVATAN OP), Apply to eye daily at bedtime, Disp: , Rfl:     ammonium lactate (LAC-HYDRIN) 12 % cream, Apply topically as needed for dry skin (Patient not taking: Reported on 2/26/2021), Disp: 385 g, Rfl: 0    diltiazem (CARDIZEM CD) 180 mg 24 hr capsule, TAKE 1 CAPSULE BY MOUTH EVERY DAY (Patient not taking: Reported on 2/16/2021), Disp: 90 capsule, Rfl: 1    GaviLyte-N with Flavor Pack 420 g solution, once Take as directed, Disp: , Rfl:     montelukast (SINGULAIR) 10 mg tablet, 10 mg every morning , Disp: , Rfl:         Review of Systems:  Review of Systems   Constitutional: Positive for fatigue  Cardiovascular: Positive for palpitations  Musculoskeletal: Positive for arthralgias and myalgias  Neurological: Positive for light-headedness  All other systems reviewed and are negative  Physical Exam:  Vitals:  Vitals:    02/26/21 1400   BP: 118/80   BP Location: Right arm   Patient Position: Sitting   Cuff Size: Standard   Pulse: 72   Temp: 99 2 °F (37 3 °C)   TempSrc: Temporal   SpO2: 98%   Weight: 83 5 kg (184 lb)   Height: 5' 4" (1 626 m)     Physical Exam   Constitutional: She appears healthy  No distress  HENT:   Nose: Nose normal    Mouth/Throat: Dentition is normal  Oropharynx is clear  Eyes: Pupils are equal, round, and reactive to light  Conjunctivae are normal    Neck: Normal range of motion  Neck supple  No JVD present  Cardiovascular: Normal rate, regular rhythm and normal heart sounds  Exam reveals no gallop and no friction rub  No murmur heard  Pulmonary/Chest: Effort normal and breath sounds normal  She has no wheezes  She has no rales  Musculoskeletal:         General: No edema     Neurological: She is alert and oriented to person, place, and time  Skin: Skin is warm and dry  Cardiographics:  EKG: Personally reviewed NSR  Last known EF: 55%    This note was completed in part utilizing M-Modal Fluency Direct Software  Grammatical errors, random word insertions, spelling mistakes, and incomplete sentences can be an occasional consequence of this system secondary to software limitations, ambient noise, and hardware issues  If you have any questions or concerns about the content, text, or information contained within the body of this dictation, please contact the provider for clarification

## 2021-02-27 PROCEDURE — 93000 ELECTROCARDIOGRAM COMPLETE: CPT | Performed by: INTERNAL MEDICINE

## 2021-05-24 ENCOUNTER — TELEPHONE (OUTPATIENT)
Dept: GASTROENTEROLOGY | Facility: CLINIC | Age: 81
End: 2021-05-24

## 2021-05-24 NOTE — TELEPHONE ENCOUNTER
Pt lmom asking for a call back to schedule an ASAP appt with Dr Alina Pedraza  I reviewed Dr Charles Reynolds schedule, right now he has 7/8 at 3pm open in [place] or 7/12/21 multiple times in [place]  Can offer one of those and then offer an appt sooner with an NP  I returned pt's call, lmom apologizing that we missed her and asked her to please call back  If pt calls back, please schedule her

## 2021-06-03 NOTE — TELEPHONE ENCOUNTER
Patient left message asking for a call to see if she can get seen sooner with one of our nurse practitioners or PA  I returned her call and she has two ventral hernias indicated on her Ct Scan and everytime she eats she gets pain and asked if they could cause diarrhea  I suggested she call pcp to see if she needs a surgeon or if they suggest she sees Dr Lukas Rothman I can schedule June 14th with DELFINO, our nurse practitioner  She will call family doctor to see what they say  If she calls back to schedule, please schedule

## 2021-06-07 ENCOUNTER — HOSPITAL ENCOUNTER (OUTPATIENT)
Dept: NON INVASIVE DIAGNOSTICS | Facility: HOSPITAL | Age: 81
Discharge: HOME | End: 2021-06-07
Attending: INTERNAL MEDICINE
Payer: MEDICARE

## 2021-06-07 DIAGNOSIS — I49.9 IRREGULAR HEART BEAT: ICD-10-CM

## 2021-06-07 PROCEDURE — 93226 XTRNL ECG REC<48 HR SCAN A/R: CPT

## 2021-06-07 PROCEDURE — 93225 XTRNL ECG REC<48 HRS REC: CPT

## 2021-06-19 PROCEDURE — 93227 XTRNL ECG REC<48 HR R&I: CPT | Performed by: INTERNAL MEDICINE

## 2021-06-21 ENCOUNTER — TELEPHONE (OUTPATIENT)
Dept: CARDIOLOGY CLINIC | Facility: CLINIC | Age: 81
End: 2021-06-21

## 2021-06-21 NOTE — TELEPHONE ENCOUNTER
Patient called to seek results of holter monitor  She reports occasional palpitations  However this weekend she was pulling weeds and felt a stabbing pain in her chest    Pain was a 10 on the pain scale, like she can't breathe, has to hold the breath

## 2021-06-21 NOTE — TELEPHONE ENCOUNTER
----- Message from Satinder Tucker DO sent at 6/21/2021  4:05 PM EDT -----  Can you please let the patient know she had a lot of abnormal beats on her monitor  She had a large amount of PVCs and PACs    Nothing dangerous but is she if eeling palpitations, we may need to increase diltiazem dose or switch to something else

## 2021-07-04 DIAGNOSIS — R00.2 PALPITATIONS: ICD-10-CM

## 2021-07-04 RX ORDER — DILTIAZEM HYDROCHLORIDE 180 MG/1
CAPSULE, COATED, EXTENDED RELEASE ORAL
Qty: 90 CAPSULE | Refills: 1 | Status: SHIPPED | OUTPATIENT
Start: 2021-07-04 | End: 2021-07-15

## 2021-07-15 DIAGNOSIS — I10 HYPERTENSION: ICD-10-CM

## 2021-07-15 DIAGNOSIS — R00.2 PALPITATIONS: ICD-10-CM

## 2021-07-15 RX ORDER — DILTIAZEM HYDROCHLORIDE 180 MG/1
180 CAPSULE, EXTENDED RELEASE ORAL DAILY
Qty: 90 CAPSULE | Refills: 3 | Status: SHIPPED | OUTPATIENT
Start: 2021-07-15 | End: 2022-05-06

## 2021-07-15 RX ORDER — DILTIAZEM HYDROCHLORIDE 180 MG/1
CAPSULE, COATED, EXTENDED RELEASE ORAL
Qty: 90 CAPSULE | Refills: 1 | Status: SHIPPED | OUTPATIENT
Start: 2021-07-15 | End: 2022-05-06

## 2021-07-19 ENCOUNTER — TELEPHONE (OUTPATIENT)
Dept: GASTROENTEROLOGY | Facility: CLINIC | Age: 81
End: 2021-07-19

## 2021-07-19 NOTE — TELEPHONE ENCOUNTER
Patient had left message asking for an appointment next week with Dr Soumya Walters after 1 pm any day  I returned her call and had to leave message for her to call back to schedule but there was nothing available next week  If she calls back, please schedule  Thank you!

## 2021-09-08 NOTE — TELEPHONE ENCOUNTER
Pt mandiom asking for a call back to schedule an appt with Dr Ana Alonzo  I returned pt's call, lmom apologizing for missing her and asked her to please call back

## 2021-09-08 NOTE — TELEPHONE ENCOUNTER
Returned pts call  She was calling to schedule f/u appt with Dr Marcial Fails  If she calls back, please get her scheduled  Thank you

## 2021-09-09 ENCOUNTER — TELEPHONE (OUTPATIENT)
Dept: GASTROENTEROLOGY | Facility: CLINIC | Age: 81
End: 2021-09-09

## 2021-09-09 NOTE — TELEPHONE ENCOUNTER
Patient had Ct scan in February showing 2 ventral hernias  She is asking for you to recommend a surgeon who would treat this and a bladder problem    Please advise  Thank you

## 2022-01-19 ENCOUNTER — TELEPHONE (OUTPATIENT)
Dept: CARDIOLOGY CLINIC | Facility: CLINIC | Age: 82
End: 2022-01-19

## 2022-01-19 NOTE — TELEPHONE ENCOUNTER
Sp with patient  She is experiencing shortness of breath with exertion going up stairs with Chest Pain  + Pain in legs  I advised patient this warrants further eval at the Emergency Room-pt  stated, she understands but didn't sound convinced to go  Pt stressed that she doesn't have transportation and also tried getting a service to deliver meals to her home  Newly diagnosed with Diabetes and is on a new medication for treatment but cannot recall what it is  Pt has [employer]  I asked patient to request medical records, labs, Le Vascular Studies, cardiac related reports and the last office visit notes from [facility] providers  Pt commented, that she may have to find a Cardiologist in [facility]  An appointment was offered with Dr Dennis February 10th  Pt accepted  Advised patient to obtain all outside records  Offered appt  for March with Dr Pride-declined  Again, patient was advised given her Chest Pain with shortness of breath on exertion that she should be evaluated at emergency room  Task sent to Dr Pride

## 2022-01-19 NOTE — TELEPHONE ENCOUNTER
Called Dr Poon's office-they will fax the last o v  note and labs  Anything else, patient will need to sign a consent release    Dr Surendra Walters fax [phone]

## 2022-01-19 NOTE — TELEPHONE ENCOUNTER
Patient called today  C/o sob, pain in her legs, palpitations, feeling like stab feeling, tired    May be reached at  [phone]  Please advise

## 2022-02-10 ENCOUNTER — OFFICE VISIT (OUTPATIENT)
Dept: CARDIOLOGY CLINIC | Facility: CLINIC | Age: 82
End: 2022-02-10
Payer: MEDICARE

## 2022-02-10 VITALS
DIASTOLIC BLOOD PRESSURE: 62 MMHG | SYSTOLIC BLOOD PRESSURE: 100 MMHG | OXYGEN SATURATION: 97 % | WEIGHT: 178 LBS | HEART RATE: 80 BPM | HEIGHT: 64 IN | TEMPERATURE: 98.6 F | BODY MASS INDEX: 30.39 KG/M2

## 2022-02-10 DIAGNOSIS — R07.2 PRECORDIAL PAIN: ICD-10-CM

## 2022-02-10 DIAGNOSIS — I10 BENIGN ESSENTIAL HYPERTENSION: Primary | ICD-10-CM

## 2022-02-10 DIAGNOSIS — E11.42 DIABETIC POLYNEUROPATHY ASSOCIATED WITH TYPE 2 DIABETES MELLITUS (HCC): ICD-10-CM

## 2022-02-10 PROCEDURE — 93000 ELECTROCARDIOGRAM COMPLETE: CPT | Performed by: INTERNAL MEDICINE

## 2022-02-10 PROCEDURE — 99214 OFFICE O/P EST MOD 30 MIN: CPT | Performed by: INTERNAL MEDICINE

## 2022-02-10 NOTE — PROGRESS NOTES
Progress Note - Cardiology Office  [facility]    Ray Ardon [de-identified] y o  female MRN: [id number]  : 1940  Encounter: [id number]      ASSESSMENT:   Complains of occasional palpitation with lightheadedness  Chest tightness  Dyspnea    Patient has been having above symptoms for some time and she may be going for some abdominal surgery which has not yet been scheduled or decided upon    On Cardizem  mg for PACs and PVCs    48 hour Holter monitor, 2021:  Sinus rhythm with average heart rate 88/Min  9 1% PVCs, no VT  11 8% PACs, longest NSVT:  7 beats at 130/Min  No diary entries provided    Hypertension  BP today is 100/62 mmHg with heart rate of 96/Min    Dyslipidemia  On atorvastatin 40 mg    Obesity:  BMI is 30 55    Anxiety disorder    Arthritis of knees    Abnormal EKG    ? PAD   Had Vascular study at [facility]    RECOMMENDATIONS:  Obtain records from [facility] for peripheral arterial study    Echocardiogram  Pharmacologic stress test  48 hour Holter monitor      Please call [phone] if any questions  HPI :     Ray Ardon is a [de-identified]y o  year old female who came for follow up  Patient has a history of frequent PACs and PVCs and has been on Cardizem CD 1 80 mg daily  Recently she has again started having palpitations  She is also experiencing some chest tightness and pain along with dyspnea on exertion  She also is planning to have some kind of extensive abdominal surgery along with some plastic surgery  She has however not seen the surgeon yet and the details of the surgery are not available  According to the patient she also had some vascular study done at [facility] and was told that she has some blockage but has not seen any physician regarding her lower extremity vasculature    REVIEW OF SYSTEMS:  Review of Systems   Respiratory: Positive for shortness of breath  Cardiovascular: Positive for chest pain and palpitations  Historical Information   Past Medical History:   Diagnosis Date    Arthritis     DJD- left thumb DJD,most joints    Borderline diabetes     Change in bowel habits     Chronic pain disorder     lumbar    Colon polyp     Constipation     unable to expell the stool completely with BM    Deviated septum     Diverticulosis     DJD (degenerative joint disease)     pt does get injections to the knee with Dr Martha Santiago GERD (gastroesophageal reflux disease)     Glaucoma     Hearing aid worn     bilateral ears for background noise    Hyperlipidemia     Irregular heart beat     "skips a beat"    Low back pain     Lumbar degenerative disc disease 9/16/2019    Muscle spasm     legs    Peripheral neuropathy     PND (post-nasal drip)     Presence of upper and lower permanent dental bridges     Sciatica     Spinal stenosis     Wears glasses      Past Surgical History:   Procedure Laterality Date    BREAST SURGERY  2005    bilateral reduction    COLON SURGERY  2014    resection with colostomy-ruptured "intestine"    COLONOSCOPY N/A 7/11/2016    Procedure: COLONOSCOPY;  Surgeon: Rod Yoon MD;  Location: [facility]; Service:     COLOSTOMY CLOSURE  2015    COMBINED REDUCTION MAMMAPLASTY W/ LIPOSUCTION      EPIDURAL BLOCK INJECTION N/A 11/15/2019    Procedure: L4 L5 Lumbar Epidural Steroid Injection (11241); Surgeon: Junior Mattson MD;  Location: [facility] MAIN OR;  Service: Pain Management     EPIDURAL BLOCK INJECTION N/A 3/12/2020    Procedure: L4 L5 Lumbar Epidural Steroid Injection (17461); Surgeon: Junior Mattson MD;  Location: [facility] OR;  Service: Pain Management     EPIDURAL BLOCK INJECTION N/A 7/23/2020    Procedure: L4-L5 LUMBAR EPIDURAL STEROID INJECTION (#2) (49120);   Surgeon: Junior Mattson MD;  Location: [facility] OR;  Service: Pain Management     HERNIA REPAIR  2016    x3     Social History     Substance and Sexual Activity   Alcohol Use No     Social History Substance and Sexual Activity   Drug Use Yes    Types: Marijuana    Comment: medical at HS     Social History     Tobacco Use   Smoking Status Former Smoker    Quit date: 1976    Years since quittin 6   Smokeless Tobacco Never Used     Family History:   Family History   Problem Relation Age of Onset    Heart disease Mother     Dementia Father     No Known Problems Sister     No Known Problems Brother     No Known Problems Maternal Aunt     No Known Problems Maternal Uncle     No Known Problems Paternal Aunt     No Known Problems Paternal Uncle     No Known Problems Maternal Grandmother     No Known Problems Maternal Grandfather     No Known Problems Paternal Grandmother     No Known Problems Paternal Grandfather        Meds/Allergies     No Known Allergies    Current Outpatient Medications:     ammonium lactate (LAC-HYDRIN) 12 % cream, Apply topically as needed for dry skin (Patient not taking: Reported on 2021), Disp: 385 g, Rfl: 0    aspirin (ASPIRIN LOW DOSE) 81 mg EC tablet, 81 mg every morning , Disp: , Rfl:     atorvastatin (LIPITOR) 40 mg tablet, Take 1 tablet (40 mg total) by mouth daily (Patient taking differently: Take 40 mg by mouth every evening ), Disp: 90 tablet, Rfl: 3    Coenzyme Q10 (Co Q 10) 100 MG CAPS, Take by mouth daily with lunch, Disp: , Rfl:     COMFORT EZ PEN NEEDLES 31G X 6 MM MISC, daily , Disp: , Rfl:     diltiazem (CARDIZEM CD) 180 mg 24 hr capsule, TAKE ONE CAPSULE BY MOUTH DAILY, Disp: 90 capsule, Rfl: 1    diltiazem (TIAZAC) 180 MG 24 hr capsule, Take 1 capsule (180 mg total) by mouth daily, Disp: 90 capsule, Rfl: 3    GaviLyte-N with Flavor Pack 420 g solution, once Take as directed, Disp: , Rfl:     liraglutide (VICTOZA) injection, Inject 0 6 mg under the skin daily after breakfast , Disp: , Rfl:     montelukast (SINGULAIR) 10 mg tablet, 10 mg every morning , Disp: , Rfl:     omeprazole (PriLOSEC) 40 MG capsule, Take 1 capsule (40 mg total) by mouth every morning, Disp: 90 capsule, Rfl: 0    ONE TOUCH ULTRA TEST test strip, daily as needed , Disp: , Rfl: 1    PARoxetine (PAXIL) 10 mg tablet, 10 mg every morning , Disp: , Rfl:     Travoprost (TRAVATAN OP), Apply to eye daily at bedtime, Disp: , Rfl:     Vitals:   /62 mmHg  HR 80/Min  BP Readings from Last 3 Encounters:   02/26/21 118/80   02/16/21 116/76   01/05/21 168/87       Physical Exam:  Neurologic:  Alert & oriented x 3, no new focal deficits, Not in any acute distress,  Constitutional:  Well developed, well nourished, non-toxic appearance   Eyes:  Pupil equal and reacting to light, conjunctiva normal,   HENT:  Atraumatic, oropharynx moist, Neck- normal range of motion, no tenderness,  Neck supple, No JVP, No LNP   Respiratory:  Bilateral air entry, mostly clear to auscultation  Cardiovascular: S1-S2 occasionally irregular with a I/VI ejection systolic murmur   GI:  Soft, nondistended, normal bowel sounds, nontender, no hepatosplenomegaly appreciated  Musculoskeletal:no tenderness, no deformities  Skin:  Well hydrated, no rash   Lymphatic:  No lymphadenopathy noted   Extremities:  No edema         Diagnostic Studies Review Cardio:      EKG:  Sinus rhythm with frequent PVCs and PACs, heart rate 80 per minute,    Cardiac testing:   See Holter monitor in assessment    Imaging:  Chest X-Ray:   No Chest XR results available for this patient  CT-scan of the chest:     No CTA results available for this patient    Lab Review   Lab Results   Component Value Date    WBC 3 87 (L) 01/01/2021    HGB 14 7 01/01/2021    HCT 44 9 01/01/2021    MCV 90 01/01/2021    RDW 13 9 01/01/2021     01/01/2021     BMP:  Lab Results   Component Value Date    SODIUM 143 02/16/2021    K 4 2 02/16/2021     (H) 02/16/2021    CO2 28 02/16/2021    ANIONGAP 9 4 10/18/2013    BUN 22 02/16/2021    CREATININE 0 64 02/16/2021    GLUC 97 02/16/2021    CALCIUM 9 7 02/16/2021    EGFR 85 02/16/2021    MG 1 8 01/01/2021     LFT:  Lab Results   Component Value Date    AST 25 01/01/2021    ALT 24 01/01/2021    ALKPHOS 91 01/01/2021    TP 7 1 01/01/2021    ALB 3 5 01/01/2021      No components found for: [employer] [facility]  Lab Results   Component Value Date    TJW8LUVJOGXV 2 441 04/04/2016     Lab Results   Component Value Date    HGBA1C 5 4 11/30/2021     Lipid Profile:   Lab Results   Component Value Date    CHOLESTEROL 150 04/04/2016    HDL 47 04/04/2016    LDLCALC 77 04/04/2016    TRIG 132 04/04/2016     Lab Results   Component Value Date    CHOLESTEROL 150 04/04/2016     No results found for: CKTOTAL, CKMB, CKMBINDEX, TROPONINI  No results found for: NTBNP   No results found for this or any previous visit (from the past 672 hour(s))  Dr Jean Paul Reyes MD, [facility]      "This note has been constructed using a voice recognition system  Therefore there may be syntax, spelling, and/or grammatical errors  Please call if you have any questions  "    ADDENDUM,  03/29/2022:  Patient had cardiac testing done  Echocardiogram,     EF 55%  No definite regional wall motion abnormality seen although it cannot be absolutely excluded on the basis of this study  No significant valve abnormality  Pharmacologic stress test    No nuclear, or EKG evidence of significant ischemia  48 hour Holter monitor  Underlying rhythm was sinus, frequent PVCs with a PVC burden of 16 8% but no VT  No symptoms recorded in patient's diary    In view of above testing, the patient has intermediate/acceptable perioperative cardiac risk with surgery  Perioperative cardiac monitoring should be done    Dr Jean Paul Reyes MD, [facility]      "This note has been constructed using a voice recognition system  Therefore there may be syntax, spelling, and/or grammatical errors   Please call if you have any questions  "

## 2022-03-10 ENCOUNTER — HOSPITAL ENCOUNTER (OUTPATIENT)
Dept: RADIOLOGY | Facility: HOSPITAL | Age: 82
Discharge: HOME | End: 2022-03-10
Attending: INTERNAL MEDICINE
Payer: MEDICARE

## 2022-03-10 ENCOUNTER — HOSPITAL ENCOUNTER (OUTPATIENT)
Dept: NON INVASIVE DIAGNOSTICS | Facility: HOSPITAL | Age: 82
Discharge: HOME | End: 2022-03-10
Attending: INTERNAL MEDICINE
Payer: MEDICARE

## 2022-03-10 VITALS
BODY MASS INDEX: 30.39 KG/M2 | WEIGHT: 178 LBS | HEIGHT: 64 IN | DIASTOLIC BLOOD PRESSURE: 62 MMHG | SYSTOLIC BLOOD PRESSURE: 100 MMHG

## 2022-03-10 DIAGNOSIS — I10 BENIGN ESSENTIAL HYPERTENSION: ICD-10-CM

## 2022-03-10 DIAGNOSIS — R07.2 PRECORDIAL PAIN: ICD-10-CM

## 2022-03-10 LAB
AORTIC ROOT: 2.9 CM
AORTIC VALVE MEAN VELOCITY: 10.6 M/S
APICAL FOUR CHAMBER EJECTION FRACTION: 60 %
AV LVOT MEAN GRADIENT: 2 MMHG
AV LVOT PEAK GRADIENT: 4 MMHG
AV MEAN GRADIENT: 5 MMHG
AV PEAK GRADIENT: 9 MMHG
AV VELOCITY RATIO: 0.69
BASELINE ST DEPRESSION: 0 MM
CHEST PAIN STATEMENT: NORMAL
DOP CALC AO PEAK VEL: 1.49 M/S
DOP CALC AO VTI: 30.52 CM
DOP CALC LVOT PEAK VEL VTI: 21.51 CM
DOP CALC LVOT PEAK VEL: 1.03 M/S
E WAVE DECELERATION TIME: 214 MS
FRACTIONAL SHORTENING: 43 % (ref 28–44)
INTERVENTRICULAR SEPTUM IN DIASTOLE (PARASTERNAL SHORT AXIS VIEW): 1.1 CM
INTERVENTRICULAR SEPTUM: 1.1 CM (ref 0.53–0.99)
LAAS-AP2: 16.4 CM2
LAAS-AP4: 17.9 CM2
LEFT ATRIUM AREA SYSTOLE SINGLE PLANE A4C: 17.2 CM2
LEFT ATRIUM SIZE: 3.8 CM
LEFT INTERNAL DIMENSION IN SYSTOLE: 2.4 CM (ref 2.76–4.17)
LEFT VENTRICULAR INTERNAL DIMENSION IN DIASTOLE: 4.2 CM (ref 4.52–6.73)
LEFT VENTRICULAR POSTERIOR WALL IN END DIASTOLE: 1.1 CM (ref 0.51–0.97)
LEFT VENTRICULAR STROKE VOLUME: 60 ML
LVSV (TEICH): 60 ML
MAX DIASTOLIC BP: 76 MMHG
MAX HEART RATE: 102 BPM
MAX HR PERCENT: 72 %
MAX HR: 140 BPM
MAX PREDICTED HEART RATE: 140 BPM
MAX. SYSTOLIC BP: 148 MMHG
MV E'TISSUE VEL-LAT: 12 CM/S
MV E'TISSUE VEL-SEP: 8 CM/S
MV PEAK A VEL: 0.86 M/S
MV PEAK E VEL: 76 CM/S
MV STENOSIS PRESSURE HALF TIME: 62 MS
MV VALVE AREA P 1/2 METHOD: 3.55 CM2
PROTOCOL NAME: NORMAL
RATE PRESSURE PRODUCT: NORMAL
REASON FOR TERMINATION: NORMAL
RIGHT ATRIUM AREA SYSTOLE A4C: 17 CM2
RIGHT VENTRICLE ID DIMENSION: 3.1 CM
SL CV LEFT ATRIUM LENGTH A2C: 4.8 CM
SL CV PED ECHO LEFT VENTRICLE DIASTOLIC VOLUME (MOD BIPLANE) 2D: 80 ML
SL CV PED ECHO LEFT VENTRICLE SYSTOLIC VOLUME (MOD BIPLANE) 2D: 20 ML
SL CV REST NUCLEAR ISOTOPE DOSE: 10.62 MCI
SL CV STRESS NUCLEAR ISOTOPE DOSE: 32.4 MCI
SL CV STRESS RECOVERY BP: NORMAL MMHG
SL CV STRESS RECOVERY HR: 85 BPM
SL CV STRESS RECOVERY O2 SAT: 98 %
STRESS ANGINA INDEX: 0
STRESS BASELINE BP: NORMAL MMHG
STRESS BASELINE HR: 73 BPM
STRESS DUKE TREADMILL SCORE: 1
STRESS O2 SAT REST: 99 %
STRESS PEAK HR: 102 BPM
STRESS PERCENT HR: 72 %
STRESS POST ESTIMATED WORKLOAD: 1 METS
STRESS POST EXERCISE DUR MIN: 1 MIN
STRESS POST O2 SAT PEAK: 99 %
STRESS POST PEAK BP: 128 MMHG
STRESS ST DEPRESSION: 0 MM
STRESS TARGET HR: 102 BPM
TARGET HR FORMULA: NORMAL
TEST INDICATION: NORMAL
TIME IN EXERCISE PHASE: NORMAL
TR MAX PG: 21 MMHG
TR PEAK VELOCITY: 2.3 M/S
TRICUSPID VALVE PEAK REGURGITATION VELOCITY: 2.31 M/S
Z-SCORE OF INTERVENTRICULAR SEPTUM IN END DIASTOLE: 2.93
Z-SCORE OF LEFT VENTRICULAR DIMENSION IN END DIASTOLE: -2.72
Z-SCORE OF LEFT VENTRICULAR DIMENSION IN END SYSTOLE: -2.73
Z-SCORE OF LEFT VENTRICULAR POSTERIOR WALL IN END DIASTOLE: 3.04

## 2022-03-10 PROCEDURE — 93016 CV STRESS TEST SUPVJ ONLY: CPT | Performed by: INTERNAL MEDICINE

## 2022-03-10 PROCEDURE — 93226 XTRNL ECG REC<48 HR SCAN A/R: CPT

## 2022-03-10 PROCEDURE — 93306 TTE W/DOPPLER COMPLETE: CPT

## 2022-03-10 PROCEDURE — 93018 CV STRESS TEST I&R ONLY: CPT | Performed by: INTERNAL MEDICINE

## 2022-03-10 PROCEDURE — 93225 XTRNL ECG REC<48 HRS REC: CPT

## 2022-03-10 PROCEDURE — 78452 HT MUSCLE IMAGE SPECT MULT: CPT

## 2022-03-10 PROCEDURE — 93306 TTE W/DOPPLER COMPLETE: CPT | Performed by: INTERNAL MEDICINE

## 2022-03-10 PROCEDURE — 93017 CV STRESS TEST TRACING ONLY: CPT

## 2022-03-10 PROCEDURE — 78452 HT MUSCLE IMAGE SPECT MULT: CPT | Performed by: INTERNAL MEDICINE

## 2022-03-10 PROCEDURE — A9502 TC99M TETROFOSMIN: HCPCS

## 2022-03-10 RX ADMIN — REGADENOSON 0.4 MG: 0.08 INJECTION, SOLUTION INTRAVENOUS at 09:54

## 2022-03-16 ENCOUNTER — TELEPHONE (OUTPATIENT)
Dept: CARDIOLOGY CLINIC | Facility: CLINIC | Age: 82
End: 2022-03-16

## 2022-03-16 PROCEDURE — 93227 XTRNL ECG REC<48 HR R&I: CPT | Performed by: INTERNAL MEDICINE

## 2022-03-16 NOTE — TELEPHONE ENCOUNTER
sw patient, made aware of results  She would like testing forwarded to Dr Shannan Watters  Could not locate Dr Shannan Watters in the system, testing has been forwarded to PCP on file

## 2022-03-16 NOTE — TELEPHONE ENCOUNTER
----- Message from Sary Anton MD sent at 3/16/2022 10:19 AM EDT -----  Please call and inform patient that the Holter monitor was reviewed  This revealed frequent PVCs/extra beats from the lower chambers of the heart but there were no ventricular arrhythmias  No significant symptoms were reported during the monitoring  No immediate treatment change or action is needed  Will discuss further at next office visit    Please call and inform the patient that the Echocardiogram showed normal pumping function of the heart      No significant valve abnormality was seen  Aortic root might be mildly dilated   Will discuss at office visit if a CT scan is needed     Please inform the patient that the stress test showed NO evidence of any significant blockage in the blood vessels of your heart

## 2022-03-29 NOTE — TELEPHONE ENCOUNTER
Pt called back and would like clearance sent to corry singh# [phone]      If pt is cleared pls addend last office visit so we can fax for pt   Thank you

## 2022-04-14 ENCOUNTER — TELEPHONE (OUTPATIENT)
Dept: OTHER | Facility: OTHER | Age: 82
End: 2022-04-14

## 2022-05-06 ENCOUNTER — OFFICE VISIT (OUTPATIENT)
Dept: CARDIOLOGY CLINIC | Facility: CLINIC | Age: 82
End: 2022-05-06
Payer: MEDICARE

## 2022-05-06 VITALS
SYSTOLIC BLOOD PRESSURE: 118 MMHG | HEIGHT: 64 IN | WEIGHT: 196 LBS | DIASTOLIC BLOOD PRESSURE: 72 MMHG | OXYGEN SATURATION: 95 % | HEART RATE: 86 BPM | BODY MASS INDEX: 33.46 KG/M2 | TEMPERATURE: 99.3 F

## 2022-05-06 DIAGNOSIS — I10 PRIMARY HYPERTENSION: ICD-10-CM

## 2022-05-06 DIAGNOSIS — E78.5 DYSLIPIDEMIA: ICD-10-CM

## 2022-05-06 DIAGNOSIS — R00.2 PALPITATIONS: ICD-10-CM

## 2022-05-06 DIAGNOSIS — I49.9 IRREGULAR HEART BEAT: Primary | ICD-10-CM

## 2022-05-06 PROCEDURE — 99214 OFFICE O/P EST MOD 30 MIN: CPT | Performed by: INTERNAL MEDICINE

## 2022-05-06 RX ORDER — METOPROLOL SUCCINATE 50 MG/1
50 TABLET, EXTENDED RELEASE ORAL DAILY
Qty: 30 TABLET | Refills: 5 | Status: SHIPPED | OUTPATIENT
Start: 2022-05-06

## 2022-05-06 NOTE — PROGRESS NOTES
Cardiology   Kel Alas DO, Quang Abernathy MD, Jer Carter MD, Carmella Robledo MD, [facility] [place]  -------------------------------------------------------------------  [facility]  [address], [facility],[address]  [place], [address] [place] [address], [address] [place] [address]  [phone]    Cardiology Follow Up  Joce Watson  1940  [id number]          Assessment/Plan:    1  Irregular heart beat    2  Palpitations    3  Primary hypertension    4  Dyslipidemia      - Patient with palpitations with monitor showing a large amount of PVCs  There was a 17% PVC burden  - Will discontinue diltiazem and metoprolol 50 mg daily    - Repeat 24 hour holter monitor in 2 weeks  - Discuss with her PMD about treatment options for anxiety  - Patient may proceed with hernia repair  Interval History:     Joce Watson is [de-identified] y o  female here for followup of palpitations  She has been feeling palpitations which she describes as a pounding heart beat associated with lightheadedness  Has been occurring at night  Does not happen with exertion  First occurred in October  No chest pain or shortness of breath  She has been feeling anxious  She denies any syncope or near syncope  She reports good adherence with cardizem  Previously had stress test and echocardiogram in [place] which was normal      Holter monitor was done on March 10, 2022 which showed frequent PVCs which totaled 16 8% of all monitored beats without any episodes of ventricular tachycardia  There were 4 episodes of supraventricular tachycardia with the longest lasting for 5 beats  Patient also had a stress test done which showed an EF of 71% with no ischemia or infarction noted  A 2D echocardiogram showed ejection fraction of 55% with no valve disease  TSH was normal      She will be going for hernia repair in 1 month  Last surgery was colostomy surgery with hernia repair in 2016        Past Medical History:   Diagnosis Date    Arthritis     DJD- left thumb DJD,most joints    Borderline diabetes     Change in bowel habits     Chronic pain disorder     lumbar    Colon polyp     Constipation     unable to expell the stool completely with BM    Deviated septum     Diverticulosis     DJD (degenerative joint disease)     pt does get injections to the knee with Dr Dc Martins GERD (gastroesophageal reflux disease)     Glaucoma     Hearing aid worn     bilateral ears for background noise    Hyperlipidemia     Irregular heart beat     "skips a beat"    Low back pain     Lumbar degenerative disc disease 2019    Muscle spasm     legs    Peripheral neuropathy     PND (post-nasal drip)     Presence of upper and lower permanent dental bridges     Sciatica     Spinal stenosis     Wears glasses      Social History     Socioeconomic History    Marital status:      Spouse name: Not on file    Number of children: Not on file    Years of education: Not on file    Highest education level: Not on file   Occupational History    Not on file   Tobacco Use    Smoking status: Former Smoker     Quit date: 1976     Years since quittin 8    Smokeless tobacco: Never Used   Vaping Use    Vaping Use: Never used   Substance and Sexual Activity    Alcohol use: No    Drug use: Yes     Types: Marijuana     Comment: medical at [address] Sexual activity: Not on file   Other Topics Concern    Not on file   Social History Narrative    Not on file     Social Determinants of Health     Financial Resource Strain: Not on file   Food Insecurity: Not on file   Transportation Needs: Not on file   Physical Activity: Not on file   Stress: Not on file   Social Connections: Not on file   Intimate Partner Violence: Not on file   Housing Stability: Not on file      Family History   Problem Relation Age of Onset    Heart disease Mother     Dementia Father     No Known Problems Sister     No Known Problems Brother     No Known Problems Maternal Aunt     No Known Problems Maternal Uncle     No Known Problems Paternal Aunt     No Known Problems Paternal Uncle     No Known Problems Maternal Grandmother     No Known Problems Maternal Grandfather     No Known Problems Paternal Grandmother     No Known Problems Paternal Grandfather      Past Surgical History:   Procedure Laterality Date    BREAST SURGERY  2005    bilateral reduction    COLON SURGERY  2014    resection with colostomy-ruptured "intestine"    COLONOSCOPY N/A 7/11/2016    Procedure: COLONOSCOPY;  Surgeon: Danyelle Dodd MD;  Location: [facility] GI LAB; Service:     COLOSTOMY CLOSURE  2015    COMBINED REDUCTION MAMMAPLASTY W/ LIPOSUCTION      EPIDURAL BLOCK INJECTION N/A 11/15/2019    Procedure: L4 L5 Lumbar Epidural Steroid Injection (16783); Surgeon: Bita Bertrand MD;  Location: [facility] MAIN OR;  Service: Pain Management     EPIDURAL BLOCK INJECTION N/A 3/12/2020    Procedure: L4 L5 Lumbar Epidural Steroid Injection (69333); Surgeon: Bita Bertrand MD;  Location: [facility] MAIN OR;  Service: Pain Management     EPIDURAL BLOCK INJECTION N/A 7/23/2020    Procedure: L4-L5 LUMBAR EPIDURAL STEROID INJECTION (#2) (54371);   Surgeon: Bita Bertrand MD;  Location: [facility] OR;  Service: Pain Management     HERNIA REPAIR  2016    x3       Current Outpatient Medications:     ammonium lactate (LAC-HYDRIN) 12 % cream, Apply topically as needed for dry skin, Disp: 385 g, Rfl: 0    atorvastatin (LIPITOR) 40 mg tablet, Take 1 tablet (40 mg total) by mouth daily (Patient taking differently: Take 40 mg by mouth every evening ), Disp: 90 tablet, Rfl: 3    Coenzyme Q10 (Co Q 10) 100 MG CAPS, Take by mouth daily with lunch, Disp: , Rfl:     COMFORT EZ PEN NEEDLES 31G X 6 MM MISC, daily , Disp: , Rfl:     montelukast (SINGULAIR) 10 mg tablet, 10 mg every morning , Disp: , Rfl:     omeprazole (PriLOSEC) 40 MG capsule, Take 1 capsule (40 mg total) by mouth every morning, Disp: 90 capsule, Rfl: 0    ONE TOUCH ULTRA TEST test strip, daily as needed , Disp: , Rfl: 1    Travoprost (TRAVATAN OP), Apply to eye daily at bedtime, Disp: , Rfl:     aspirin (ASPIRIN LOW DOSE) 81 mg EC tablet, 81 mg every morning  (Patient not taking: Reported on 2/10/2022 ), Disp: , Rfl:     GaviLyte-N with Flavor Pack 420 g solution, once Take as directed (Patient not taking: Reported on 2/10/2022 ), Disp: , Rfl:     liraglutide (VICTOZA) injection, Inject 0 6 mg under the skin daily after breakfast  (Patient not taking: Reported on 5/6/2022 ), Disp: , Rfl:     metoprolol succinate (TOPROL-XL) 50 mg 24 hr tablet, Take 1 tablet (50 mg total) by mouth daily, Disp: 30 tablet, Rfl: 5    PARoxetine (PAXIL) 10 mg tablet, 10 mg every morning  (Patient not taking: Reported on 2/10/2022 ), Disp: , Rfl:         Review of Systems:  Review of Systems   Constitutional: Positive for fatigue  Respiratory: Negative for shortness of breath  Cardiovascular: Positive for palpitations  Negative for chest pain and leg swelling  Musculoskeletal: Positive for arthralgias and myalgias  Psychiatric/Behavioral: The patient is nervous/anxious  All other systems reviewed and are negative  Physical Exam:  Vitals:  Vitals:    05/06/22 1319   BP: 118/72   BP Location: Left arm   Patient Position: Sitting   Cuff Size: Large   Pulse: 86   Temp: 99 3 °F (37 4 °C)   SpO2: 95%   Weight: 88 9 kg (196 lb)   Height: 5' 4" (1 626 m)     Physical Exam   Constitutional: She appears healthy  No distress  Eyes: Pupils are equal, round, and reactive to light  Conjunctivae are normal    Neck: No JVD present  Cardiovascular: Normal rate, regular rhythm and normal heart sounds  Exam reveals no gallop and no friction rub  No murmur heard  Pulmonary/Chest: Effort normal and breath sounds normal  She has no wheezes  She has no rales  Musculoskeletal:         General: No tenderness, deformity or edema  Cervical back: Normal range of motion and neck supple  Neurological: She is alert and oriented to person, place, and time  Skin: Skin is warm and dry  Cardiographics:  EKG: Personally reviewed NSR  Last known EF: 55%    This note was completed in part utilizing M-Modal Fluency Direct Software  Grammatical errors, random word insertions, spelling mistakes, and incomplete sentences can be an occasional consequence of this system secondary to software limitations, ambient noise, and hardware issues  If you have any questions or concerns about the content, text, or information contained within the body of this dictation, please contact the provider for clarification

## 2022-05-09 ENCOUNTER — TELEPHONE (OUTPATIENT)
Dept: CARDIOLOGY CLINIC | Facility: CLINIC | Age: 82
End: 2022-05-09

## 2022-05-11 ENCOUNTER — TELEPHONE (OUTPATIENT)
Dept: CARDIOLOGY CLINIC | Facility: CLINIC | Age: 82
End: 2022-05-11

## 2022-05-11 NOTE — TELEPHONE ENCOUNTER
Pt requesting her cardiac clearance for hernia repair to be faxed to Dr Antonieta Cruz at [facility]  Fax # [phone] and phone # is [phone]

## 2022-05-24 ENCOUNTER — TELEPHONE (OUTPATIENT)
Dept: CARDIOLOGY CLINIC | Facility: CLINIC | Age: 82
End: 2022-05-24

## 2022-05-24 ENCOUNTER — CONSULT (OUTPATIENT)
Dept: NEUROSURGERY | Facility: CLINIC | Age: 82
End: 2022-05-24
Payer: MEDICARE

## 2022-05-24 VITALS
HEART RATE: 70 BPM | BODY MASS INDEX: 33.46 KG/M2 | DIASTOLIC BLOOD PRESSURE: 64 MMHG | HEIGHT: 64 IN | TEMPERATURE: 98.1 F | RESPIRATION RATE: 18 BRPM | WEIGHT: 196 LBS | SYSTOLIC BLOOD PRESSURE: 128 MMHG

## 2022-05-24 DIAGNOSIS — M54.16 LUMBAR RADICULOPATHY: Primary | ICD-10-CM

## 2022-05-24 PROCEDURE — 99202 OFFICE O/P NEW SF 15 MIN: CPT | Performed by: PHYSICIAN ASSISTANT

## 2022-05-24 RX ORDER — DULOXETIN HYDROCHLORIDE 20 MG/1
CAPSULE, DELAYED RELEASE ORAL
COMMUNITY
Start: 2022-05-06

## 2022-05-24 NOTE — TELEPHONE ENCOUNTER
Pt called regarding upcoming hernia surgery with Dr Kolton Sharif  Patient was inquiring about cardiac clearance  Patient was made aware that we are awaiting results of her ZIO before clearance can be completed  Patient understands and will push out her hernia surgery if needed

## 2022-05-24 NOTE — PROGRESS NOTES
Neurosurgery Office Note  Vargas Prado [de-identified] y o  female MRN: [id number]      Assessment/Plan     Lumbar radiculopathy  Patient presents as a new consult at the request of her PCP for the evaluation of low back pain with LLE radiculopathy  · Complaining of back pain with BLE leg pain, knee pain, gait instability, some urge / stress incontinence  · Has not completed PT  · Previously following with Dr Leighton Singletary; underwent L4-5 CELESTINA x 3 with good relief    Imaging:   MRI lumbar spine w wo contrast 3/28/2022:  Completed at outside facility "moderate to severe bilateral subarticular recess stenosis at L3-4  Severe left foraminal stenosis at L3-4 secondary to disc extrusion  Severe right subarticular recess and right foraminal stenosis at L4-5 secondary to disc extrusion  Mass effect on the right L5 and L4 nerve roots is present "    Plan:   Exam:  GCS 15, very slight pain limited weakness in bilateral lower extremities worse on the right secondary to knee pain, some inconsistent vague complaints of numbness to light touch in lower extremities, gait slightly antalgic, tenderness to palpation lower lumbar spine, SI joint region   Imaging reviewed with patient  She does demonstrate some multilevel degenerative changes with some foraminal narrowing  Unsure if this is the cause of all of her current complaints  Likely multifactorial in the setting of knee pain in need replacement and diabetes  Patient is not interested in surgical intervention at this time  Would not offer surgery at this time     Ambulatory referral to pain management provided as patient has had good relief with spinal injections in the past    Ambulatory referral to physical therapy provided for back and leg strengthening and mobility   Continue over-the-counter medication prescribed medication as needed for pain control, patient is taking CBD gummies at nighttime   Patient is to return to the office on an as-needed basis or sooner should patient develop worsening symptoms or red flag signs         Diagnoses and all orders for this visit:    Lumbar radiculopathy  -     Ambulatory Referral to Pain Management; Future  -     Ambulatory Referral to Physical Therapy; Future    Other orders  -     DULoxetine (CYMBALTA) 20 mg capsule          I spent 30 minutes with the patient today in which >50% of the time was spent counseling/coordination of care regarding diagnosis, imaging review, symptoms and treatment plan  CHIEF COMPLAINT    Chief Complaint   Patient presents with    Consult    Back Pain       HISTORY    History of Present Illness     [de-identified]y o  year old female with past medical history significant for arthritis, chronic pain disorder, GERD, hyperlipidemia, peripheral neuropathy, diabetes who presents as a new consultation at the request of her PCP for the evaluation of lumbar radiculopathy  Patient is somewhat of a poor historian, does have some tangential speech and difficult to pinpoint timeline and classification of her symptoms  She has been suffering from a back and leg pain for quite some time  States the pain is across her back and radiates down bilateral lower extremities, lateral thigh and posterior calf into the feet  She states that she also has significant right knee pain, was told in the past that she was bone-on-bone and needed replacement however given her age she has declined this multiple times  She feels that now her left knee is going as well as her left hip  Her pain is waxing and waning in intensity, worse with walking and better with relaxing or sitting  She does find herself leaning on shopping carts for stability and symptom relief  She finds it very difficult to go up and down the steps, feeling that she has no circulation in her lower extremities and that they get numb however she does push herself to do these activities    She does report some episodes of incontinence where she will be lying flat and able to hold her urine in but if she tries to move her stream will start she has difficulty stopping it  She reports at nighttime she has a bucket at the bedside so that if she starts urinating she can grab that quickly  She does not have saddle anesthesia  Has never had back surgery  Has not completed recent course of physical therapy  Was previously following with Dr Megan Roy with pain management, underwent at least 3 epidural steroid injections, most recently in July of 2020 and per report she had significant relief with all of these injections, at least a few months of symptom relief  She is interested in trying those again  She states for a few years she did moved to [place] and was getting injections there although unclear if she was getting just knee injections or back injections as well but reports they were not as effective  She is taking CBD gummies at nighttime which act as a muscle relaxer for her but does not take any additional medications  Patient states she is not interested in any surgical intervention given her age  See Discussion    REVIEW OF SYSTEMS    Review of Systems   Constitutional: Negative  HENT: Negative  Eyes: Negative  Respiratory: Negative  Cardiovascular: Negative  H/o: HTN/ Hyperlipidemia   Gastrointestinal: Negative  Endocrine: Negative  Genitourinary: Negative  Musculoskeletal: Positive for back pain (B/L LBP radiates to b/l legs L>R  Left side buttock and posterior leg    Prolonged walking and standing increases her pain), gait problem (feels ubsteady at times) and joint swelling (pain in b/l knees)  Pain 8/10- CBD for pain mild relief   PT-- none  Pain Man in  7/2020 nothing recent   Skin: Negative  Allergic/Immunologic: Negative  Neurological: Positive for weakness (b/l legs) and numbness (and tingling on b/ l legs)  Hematological: Bruises/bleeds easily          On Aspirin   Psychiatric/Behavioral: Positive for sleep disturbance (due to pain)  The patient is nervous/anxious (on Paxil)  All other systems reviewed and are negative  Meds/Allergies     Current Outpatient Medications   Medication Sig Dispense Refill    ammonium lactate (LAC-HYDRIN) 12 % cream Apply topically as needed for dry skin 385 g 0    aspirin (ECOTRIN LOW STRENGTH) 81 mg EC tablet 81 mg every morning      atorvastatin (LIPITOR) 40 mg tablet Take 1 tablet (40 mg total) by mouth daily (Patient taking differently: Take 40 mg by mouth every evening) 90 tablet 3    Coenzyme Q10 (Co Q 10) 100 MG CAPS Take by mouth daily with lunch      COMFORT EZ PEN NEEDLES 31G X 6 MM MISC daily       DULoxetine (CYMBALTA) 20 mg capsule       metoprolol succinate (TOPROL-XL) 50 mg 24 hr tablet Take 1 tablet (50 mg total) by mouth daily 30 tablet 5    montelukast (SINGULAIR) 10 mg tablet 10 mg every morning       omeprazole (PriLOSEC) 40 MG capsule Take 1 capsule (40 mg total) by mouth every morning 90 capsule 0    ONE TOUCH ULTRA TEST test strip daily as needed   1    PARoxetine (PAXIL) 10 mg tablet 10 mg every morning      Travoprost (TRAVATAN OP) Apply to eye daily at bedtime      GaviLyte-N with Flavor Pack 420 g solution once Take as directed (Patient not taking: No sig reported)      liraglutide (VICTOZA) injection Inject 0 6 mg under the skin daily after breakfast  (Patient not taking: No sig reported)       No current facility-administered medications for this visit         No Known Allergies    PAST HISTORY    Past Medical History:   Diagnosis Date    Arthritis     DJD- left thumb DJD,most joints    Borderline diabetes     Change in bowel habits     Chronic pain disorder     lumbar    Colon polyp     Constipation     unable to expell the stool completely with BM    Deviated septum     Diverticulosis     DJD (degenerative joint disease)     pt does get injections to the knee with Dr Adrienne Carrillo GERD (gastroesophageal reflux disease)     Glaucoma     Hearing aid worn     bilateral ears for background noise    Hyperlipidemia     Irregular heart beat     "skips a beat"    Low back pain     Lumbar degenerative disc disease 2019    Muscle spasm     legs    Peripheral neuropathy     PND (post-nasal drip)     Presence of upper and lower permanent dental bridges     Sciatica     Spinal stenosis     Wears glasses        Past Surgical History:   Procedure Laterality Date    BREAST SURGERY      bilateral reduction    COLON SURGERY      resection with colostomy-ruptured "intestine"    COLONOSCOPY N/A 2016    Procedure: COLONOSCOPY;  Surgeon: Danyelle Dodd MD;  [facility]: [facility]; Service:     COLOSTOMY CLOSURE      COMBINED REDUCTION MAMMAPLASTY W/ LIPOSUCTION      EPIDURAL BLOCK INJECTION N/A 11/15/2019    Procedure: L4 L5 Lumbar Epidural Steroid Injection (45685); Surgeon: Bita Bertrand MD;  Location: [facility] MAIN OR;  Service: Pain Management     EPIDURAL BLOCK INJECTION N/A 3/12/2020    Procedure: L4 L5 Lumbar Epidural Steroid Injection (95020); Surgeon: Bita Bertrand MD;  Location: [facility] MAIN OR;  Service: Pain Management     EPIDURAL BLOCK INJECTION N/A 2020    Procedure: L4-L5 LUMBAR EPIDURAL STEROID INJECTION (#2) (43220);   Surgeon: Bita Bertrand MD;  Location: [facility] MAIN OR;  Service: Pain Management     HERNIA REPAIR  2016    x3       Social History     Tobacco Use    Smoking status: Former Smoker     Quit date: 1976     Years since quittin 8    Smokeless tobacco: Never Used   Vaping Use    Vaping Use: Never used   Substance Use Topics    Alcohol use: No    Drug use: Yes     Types: Marijuana     Comment: medical at        Family History   Problem Relation Age of Onset    Heart disease Mother     Dementia Father     No Known Problems Sister     No Known Problems Brother     No Known Problems Maternal Aunt     No Known Problems Maternal Uncle     No Known Problems Paternal Aunt     No Known Problems Paternal Uncle     No Known Problems Maternal Grandmother     No Known Problems Maternal Grandfather     No Known Problems Paternal Grandmother     No Known Problems Paternal Grandfather          Above history personally reviewed  EXAM    Vitals:Blood pressure 128/64, pulse 70, temperature 98 1 °F (36 7 °C), temperature source Temporal, resp  rate 18, height 5' 4" (1 626 m), weight 88 9 kg (196 lb)  ,Body mass index is 33 64 kg/m²  Physical Exam  Constitutional:       General: She is awake  Appearance: Normal appearance  HENT:      Head: Normocephalic and atraumatic  Eyes:      Conjunctiva/sclera: Conjunctivae normal    Cardiovascular:      Rate and Rhythm: Normal rate  Pulmonary:      Effort: Pulmonary effort is normal  No respiratory distress  Musculoskeletal:      Cervical back: No tenderness  No spinous process tenderness or muscular tenderness  Thoracic back: No tenderness  Lumbar back: Tenderness present  Skin:     General: Skin is warm and dry  Neurological:      Mental Status: She is alert and oriented to person, place, and time  Coordination: Finger-Nose-Finger Test normal    Psychiatric:         Attention and Perception: Attention and perception normal          Mood and Affect: Mood and affect normal          Speech: Speech normal          Behavior: Behavior normal  Behavior is cooperative  Thought Content: Thought content normal          Cognition and Memory: Cognition and memory normal          Judgment: Judgment normal          Neurologic Exam     Mental Status   Oriented to person, place, and time  Follows 1 step commands  Attention: normal  Concentration: normal    Speech: speech is normal   Level of consciousness: alert  Knowledge: good  Normal comprehension       Cranial Nerves     CN III, IV, VI   Conjugate gaze: present    CN VIII   Hearing: intact    Motor Exam   Muscle bulk: normal  Overall muscle tone: normal  Right arm pronator drift: absent  Left arm pronator drift: absent    Strength   Strength 5/5 except as noted  BLE:  5-/5 HF, otherwise 5/5 elsewhere     Sensory Exam   DST intact  Reporting numbness to light touch in bilateral lower extremities but this is very vague     Gait, Coordination, and Reflexes     Gait  Gait: (Slightly antalgic)    Coordination   Finger to nose coordination: normal    Tremor   Resting tremor: absent  Intention tremor: absent  Action tremor: absent    Reflexes   Right : 2+  Left : 2+  Right Howell: absent  Left Howell: absent  Right ankle clonus: absent  Left ankle clonus: absent        MEDICAL DECISION MAKING    Imaging Studies:     No results found  I have personally reviewed pertinent reports     and I have personally reviewed pertinent films in PACS

## 2022-05-24 NOTE — ASSESSMENT & PLAN NOTE
Patient presents as a new consult at the request of her PCP for the evaluation of low back pain with LLE radiculopathy  · Complaining of back pain with BLE leg pain, knee pain, gait instability, some urge / stress incontinence  · Has not completed PT  · Previously following with Dr Loren Cabral; underwent L4-5 CELESTINA x 3 with good relief    Imaging:   MRI lumbar spine w wo contrast 3/28/2022:  Completed at outside facility "moderate to severe bilateral subarticular recess stenosis at L3-4  Severe left foraminal stenosis at L3-4 secondary to disc extrusion  Severe right subarticular recess and right foraminal stenosis at L4-5 secondary to disc extrusion  Mass effect on the right L5 and L4 nerve roots is present "    Plan:   Exam:  GCS 15, very slight pain limited weakness in bilateral lower extremities worse on the right secondary to knee pain, some inconsistent vague complaints of numbness to light touch in lower extremities, gait slightly antalgic, tenderness to palpation lower lumbar spine, SI joint region   Imaging reviewed with patient  She does demonstrate some multilevel degenerative changes with some foraminal narrowing  Unsure if this is the cause of all of her current complaints  Likely multifactorial in the setting of knee pain in need replacement and diabetes  Patient is not interested in surgical intervention at this time  Would not offer surgery at this time     Ambulatory referral to pain management provided as patient has had good relief with spinal injections in the past    Ambulatory referral to physical therapy provided for back and leg strengthening and mobility   Continue over-the-counter medication prescribed medication as needed for pain control, patient is taking CBD gummies at nighttime   Patient is to return to the office on an as-needed basis or sooner should patient develop worsening symptoms or red flag signs

## 2022-05-24 NOTE — PATIENT INSTRUCTIONS
Ambulatory referral to pain management for consideration of additional spinal injections as patient has had good relief with this in the past   Ambulatory referral to physical therapy for back, leg and core strengthening and mobility  Outpatient follow-up on an as-needed basis or sooner should he develop worsening symptoms or red flag signs

## 2022-05-27 ENCOUNTER — TELEPHONE (OUTPATIENT)
Dept: CARDIOLOGY CLINIC | Facility: CLINIC | Age: 82
End: 2022-05-27

## 2022-05-27 NOTE — TELEPHONE ENCOUNTER
----- Message from Thuy Harding DO sent at 5/27/2022  2:00 PM EDT -----  Can you please let the patient know the monitor was overall normal  - the symptoms she felt were early beats

## 2022-07-13 DIAGNOSIS — Z20.822 COVID-19 RULED OUT BY LABORATORY TESTING: Primary | ICD-10-CM

## 2022-08-16 DIAGNOSIS — Z20.822 COVID-19 RULED OUT BY LABORATORY TESTING: ICD-10-CM

## 2022-08-16 PROCEDURE — U0003 INFECTIOUS AGENT DETECTION BY NUCLEIC ACID (DNA OR RNA); SEVERE ACUTE RESPIRATORY SYNDROME CORONAVIRUS 2 (SARS-COV-2) (CORONAVIRUS DISEASE [COVID-19]), AMPLIFIED PROBE TECHNIQUE, MAKING USE OF HIGH THROUGHPUT TECHNOLOGIES AS DESCRIBED BY CMS-2020-01-R: HCPCS | Performed by: OPHTHALMOLOGY

## 2022-08-16 PROCEDURE — U0005 INFEC AGEN DETEC AMPLI PROBE: HCPCS | Performed by: OPHTHALMOLOGY

## 2022-08-16 NOTE — PRE-PROCEDURE INSTRUCTIONS
Pre-Surgery Instructions:   Medication Instructions    ammonium lactate (LAC-HYDRIN) 12 % cream Hold day of surgery   aspirin (ECOTRIN LOW STRENGTH) 81 mg EC tablet Hold day of surgery   atorvastatin (LIPITOR) 40 mg tablet Take night before surgery    Coenzyme Q10 (Co Q 10) 100 MG CAPS Hold day of surgery   liraglutide (VICTOZA) injection Hold day of surgery   metoprolol succinate (TOPROL-XL) 50 mg 24 hr tablet Take night before surgery    montelukast (SINGULAIR) 10 mg tablet Take night before surgery    omeprazole (PriLOSEC) 40 MG capsule Take day of surgery   Travoprost (TRAVATAN OP) Instructions provided by MD    Pre op instructions reviewed with pt via phone  Instructed to take omeprazole a m of surgery   [employer] pt needs time before Friday to secure transportation  My Surgical Experience    The following information was developed to assist you to prepare for your operation  What do I need to do before coming to the hospital?   Arrange for a responsible person to drive you to and from the hospital    Arrange care for your children at home  Children are not allowed in the recovery areas of the hospital   Plan to wear clothing that is easy to put on and take off  If you are having shoulder surgery, wear a shirt that buttons or zippers in the front  Bathing  o Shower the evening before and the morning of your surgery with an antibacterial soap  Please refer to the Pre Op Showering Instructions for Surgery Patients Sheet   o Remove nail polish and all body piercing jewelry  o Do not shave any body part for at least 24 hours before surgery-this includes face, arms, legs and upper body  Food  o Nothing to eat or drink after midnight the night before your surgery   This includes candy and chewing gum  o Exception: If your surgery is after 12:00pm (noon), you may have clear liquids such as 7-Up®, ginger ale, apple or cranberry juice, Jell-O®, water, or clear broth until 8:00 am  o Do not drink milk or juice with pulp on the morning before surgery  o Do not drink alcohol 24 hours before surgery  Medicine  o Follow instructions you received from your surgeon about which medicines you may take on the day of surgery  o If instructed to take medicine on the morning of surgery, take pills with just a small sip of water  Call your prescribing doctor for specific information on what to do if you take insulin    What should I bring to the hospital?    Bring:  Valeria Deleon or a walker, if you have them, for foot or knee surgery   A list of the daily medicines, vitamins, minerals, herbals and nutritional supplements you take  Include the dosages of medicines and the time you take them each day   Glasses, dentures or hearing aids   Minimal clothing; you will be wearing hospital sleepwear   Photo ID; required to verify your identity   If you have a Living Will or Power of , bring a copy of the documents   If you have an ostomy, bring an extra pouch and any supplies you use    Do not bring   Medicines or inhalers   Money, valuables or jewelry    What other information should I know about the day of surgery?  Notify your surgeons if you develop a cold, sore throat, cough, fever, rash or any other illness   Report to the Ambulatory Surgical/Same Day Surgery Unit   You will be instructed to stop at Registration only if you have not been pre-registered   Inform your  fi they do not stay that they will be asked by the staff to leave a phone number where they can be reached   Be available to be reached before surgery  In the event the operating room schedule changes, you may be asked to come in earlier or later than expected    *It is important to tell your doctor and others involved in your health care if you are taking or have been taking any non-prescription drugs, vitamins, minerals, herbals or other nutritional supplements   Any of these may interact with some food or medicines and cause a reaction

## 2022-08-17 LAB — SARS-COV-2 RNA RESP QL NAA+PROBE: NEGATIVE

## 2022-08-22 ENCOUNTER — HOSPITAL ENCOUNTER (OUTPATIENT)
Facility: AMBULARY SURGERY CENTER | Age: 82
Setting detail: OUTPATIENT SURGERY
Discharge: HOME | End: 2022-08-22
Attending: OPHTHALMOLOGY | Admitting: OPHTHALMOLOGY
Payer: MEDICARE

## 2022-08-22 ENCOUNTER — ANESTHESIA (OUTPATIENT)
Dept: PERIOP | Facility: AMBULARY SURGERY CENTER | Age: 82
End: 2022-08-22
Payer: MEDICARE

## 2022-08-22 ENCOUNTER — ANESTHESIA EVENT (OUTPATIENT)
Dept: PERIOP | Facility: AMBULARY SURGERY CENTER | Age: 82
End: 2022-08-22
Payer: MEDICARE

## 2022-08-22 VITALS
RESPIRATION RATE: 18 BRPM | DIASTOLIC BLOOD PRESSURE: 63 MMHG | TEMPERATURE: 97.8 F | HEART RATE: 61 BPM | SYSTOLIC BLOOD PRESSURE: 138 MMHG | OXYGEN SATURATION: 94 %

## 2022-08-22 DIAGNOSIS — H25.11 AGE-RELATED NUCLEAR CATARACT OF RIGHT EYE: Primary | ICD-10-CM

## 2022-08-22 PROCEDURE — V2632 POST CHMBR INTRAOCULAR LENS: HCPCS | Performed by: OPHTHALMOLOGY

## 2022-08-22 DEVICE — IMPLANTABLE DEVICE: Type: IMPLANTABLE DEVICE | Status: FUNCTIONAL

## 2022-08-22 RX ORDER — GATIFLOXACIN 5 MG/ML
SOLUTION/ DROPS OPHTHALMIC AS NEEDED
Status: DISCONTINUED | OUTPATIENT
Start: 2022-08-22 | End: 2022-08-22 | Stop reason: HOSPADM

## 2022-08-22 RX ORDER — LIDOCAINE HYDROCHLORIDE 20 MG/ML
1 JELLY TOPICAL
Status: COMPLETED | OUTPATIENT
Start: 2022-08-22 | End: 2022-08-22

## 2022-08-22 RX ORDER — KETOROLAC TROMETHAMINE 5 MG/ML
1 SOLUTION OPHTHALMIC
Status: ACTIVE | OUTPATIENT
Start: 2022-08-22 | End: 2022-08-22

## 2022-08-22 RX ORDER — CYCLOPENTOLATE HYDROCHLORIDE 10 MG/ML
1 SOLUTION/ DROPS OPHTHALMIC
Status: ACTIVE | OUTPATIENT
Start: 2022-08-22 | End: 2022-08-22

## 2022-08-22 RX ORDER — MIDAZOLAM HYDROCHLORIDE 2 MG/2ML
INJECTION, SOLUTION INTRAMUSCULAR; INTRAVENOUS AS NEEDED
Status: DISCONTINUED | OUTPATIENT
Start: 2022-08-22 | End: 2022-08-22

## 2022-08-22 RX ORDER — LIDOCAINE HYDROCHLORIDE 10 MG/ML
INJECTION, SOLUTION EPIDURAL; INFILTRATION; INTRACAUDAL; PERINEURAL AS NEEDED
Status: DISCONTINUED | OUTPATIENT
Start: 2022-08-22 | End: 2022-08-22 | Stop reason: HOSPADM

## 2022-08-22 RX ORDER — TETRACAINE HYDROCHLORIDE 5 MG/ML
1 SOLUTION OPHTHALMIC ONCE
Status: COMPLETED | OUTPATIENT
Start: 2022-08-22 | End: 2022-08-22

## 2022-08-22 RX ORDER — CIPROFLOXACIN HYDROCHLORIDE 3.5 MG/ML
1 SOLUTION/ DROPS TOPICAL 4 TIMES DAILY
Qty: 5 ML | Refills: 0
Start: 2022-08-22

## 2022-08-22 RX ORDER — KETOROLAC TROMETHAMINE 5 MG/ML
1 SOLUTION OPHTHALMIC 4 TIMES DAILY
Qty: 5 ML | Refills: 0
Start: 2022-08-22

## 2022-08-22 RX ORDER — TETRACAINE HYDROCHLORIDE 5 MG/ML
SOLUTION OPHTHALMIC AS NEEDED
Status: DISCONTINUED | OUTPATIENT
Start: 2022-08-22 | End: 2022-08-22 | Stop reason: HOSPADM

## 2022-08-22 RX ORDER — PHENYLEPHRINE HCL 2.5 %
1 DROPS OPHTHALMIC (EYE)
Status: ACTIVE | OUTPATIENT
Start: 2022-08-22 | End: 2022-08-22

## 2022-08-22 RX ORDER — BALANCED SALT SOLUTION 6.4; .75; .48; .3; 3.9; 1.7 MG/ML; MG/ML; MG/ML; MG/ML; MG/ML; MG/ML
SOLUTION OPHTHALMIC AS NEEDED
Status: DISCONTINUED | OUTPATIENT
Start: 2022-08-22 | End: 2022-08-22 | Stop reason: HOSPADM

## 2022-08-22 RX ADMIN — LIDOCAINE HYDROCHLORIDE 1 APPLICATION: 20 JELLY TOPICAL at 09:05

## 2022-08-22 RX ADMIN — PHENYLEPHRINE HYDROCHLORIDE 1 DROP: 25 SOLUTION/ DROPS OPHTHALMIC at 09:05

## 2022-08-22 RX ADMIN — LIDOCAINE HYDROCHLORIDE 1 APPLICATION: 20 JELLY TOPICAL at 08:49

## 2022-08-22 RX ADMIN — KETOROLAC TROMETHAMINE 1 DROP: 5 SOLUTION OPHTHALMIC at 09:20

## 2022-08-22 RX ADMIN — CYCLOPENTOLATE HYDROCHLORIDE 1 DROP: 10 SOLUTION/ DROPS OPHTHALMIC at 09:05

## 2022-08-22 RX ADMIN — CYCLOPENTOLATE HYDROCHLORIDE 1 DROP: 10 SOLUTION/ DROPS OPHTHALMIC at 08:50

## 2022-08-22 RX ADMIN — LIDOCAINE HYDROCHLORIDE 1 APPLICATION: 20 JELLY TOPICAL at 09:20

## 2022-08-22 RX ADMIN — CYCLOPENTOLATE HYDROCHLORIDE 1 DROP: 10 SOLUTION/ DROPS OPHTHALMIC at 09:20

## 2022-08-22 RX ADMIN — KETOROLAC TROMETHAMINE 1 DROP: 5 SOLUTION OPHTHALMIC at 09:05

## 2022-08-22 RX ADMIN — TETRACAINE HYDROCHLORIDE 1 DROP: 5 SOLUTION OPHTHALMIC at 08:49

## 2022-08-22 RX ADMIN — KETOROLAC TROMETHAMINE 1 DROP: 5 SOLUTION OPHTHALMIC at 08:50

## 2022-08-22 RX ADMIN — PHENYLEPHRINE HYDROCHLORIDE 1 DROP: 25 SOLUTION/ DROPS OPHTHALMIC at 09:20

## 2022-08-22 RX ADMIN — PHENYLEPHRINE HYDROCHLORIDE 1 DROP: 25 SOLUTION/ DROPS OPHTHALMIC at 08:50

## 2022-08-22 RX ADMIN — MIDAZOLAM 1 MG: 1 INJECTION INTRAMUSCULAR; INTRAVENOUS at 09:38

## 2022-08-22 RX ADMIN — MIDAZOLAM 1 MG: 1 INJECTION INTRAMUSCULAR; INTRAVENOUS at 09:34

## 2022-08-22 NOTE — ANESTHESIA PREPROCEDURE EVALUATION
Procedure:  EXTRACTION EXTRACAPSULAR CATARACT PHACO INTRAOCULAR LENS (IOL) (Right Eye)    Relevant Problems   CARDIO   (+) Benign essential hypertension      GI/HEPATIC   (+) GERD (gastroesophageal reflux disease)      MUSCULOSKELETAL   (+) Chronic bilateral low back pain with bilateral sciatica   (+) Low back pain   (+) Lumbar degenerative disc disease      NEURO/PSYCH   (+) Chronic bilateral low back pain with bilateral sciatica   (+) Chronic pain syndrome   (+) Diabetic polyneuropathy associated with type 2 diabetes mellitus (HCC)        Physical Exam    Airway    Mallampati score: II  TM Distance: >3 FB  Neck ROM: full     Dental   No notable dental hx     Cardiovascular  Cardiovascular exam normal    Pulmonary  Pulmonary exam normal     Other Findings        Anesthesia Plan  ASA Score- 2     Anesthesia Type- IV sedation with anesthesia with ASA Monitors  Additional Monitors:   Airway Plan:           Plan Factors-Exercise tolerance (METS): >4 METS  Chart reviewed  Imaging results reviewed  Existing labs reviewed  Patient summary reviewed  Patient is not a current smoker  Obstructive sleep apnea risk education given perioperatively  Induction-     Postoperative Plan-     Informed Consent- Anesthetic plan and risks discussed with patient  I personally reviewed this patient with the CRNA  Discussed and agreed on the Anesthesia Plan with the VIRGILIO Amin

## 2022-08-22 NOTE — DISCHARGE INSTRUCTIONS
Dr Adry Cortez Cataract Instructions    Activity:     1  No Driving until instructed   2  Keep shield on until seen tomorrow except when administering drops   3  No heavy lifting   4  No water in eye     Diet:     1  Resume normal diet    Normal Symptoms:     1  Mild Headache   2  Scratchy or picky feeling around eye    Call the office if:     1  You have any questions or concerns   2  If eye pain is not relieved by extra strength tylenol    Office phone number:  [phone]      Next appointment:     1  See Dr Adry Cortez at his office tomorrow as scheduled   _________11:15am_________   2  Bring blue eye kit with you and eyedrops to the office    A new set of comprehensive instructions will be given and reviewed with you during your office visit tomorrow

## 2022-09-22 ENCOUNTER — TELEPHONE (OUTPATIENT)
Dept: OBGYN CLINIC | Facility: HOSPITAL | Age: 82
End: 2022-09-22

## 2022-09-22 NOTE — TELEPHONE ENCOUNTER
Patient would like to know if we could start the process for gel injections please (Bilateral)  - Thank you

## 2022-09-23 ENCOUNTER — OFFICE VISIT (OUTPATIENT)
Dept: CARDIOLOGY CLINIC | Facility: CLINIC | Age: 82
End: 2022-09-23
Payer: MEDICARE

## 2022-09-23 VITALS
WEIGHT: 201 LBS | TEMPERATURE: 98.6 F | HEART RATE: 82 BPM | DIASTOLIC BLOOD PRESSURE: 76 MMHG | OXYGEN SATURATION: 96 % | SYSTOLIC BLOOD PRESSURE: 118 MMHG | BODY MASS INDEX: 34.31 KG/M2 | HEIGHT: 64 IN

## 2022-09-23 DIAGNOSIS — M25.562 CHRONIC PAIN OF BOTH KNEES: Primary | ICD-10-CM

## 2022-09-23 DIAGNOSIS — I49.9 IRREGULAR HEART BEAT: ICD-10-CM

## 2022-09-23 DIAGNOSIS — M25.561 CHRONIC PAIN OF BOTH KNEES: Primary | ICD-10-CM

## 2022-09-23 DIAGNOSIS — E78.5 DYSLIPIDEMIA: ICD-10-CM

## 2022-09-23 DIAGNOSIS — G89.29 CHRONIC PAIN OF BOTH KNEES: Primary | ICD-10-CM

## 2022-09-23 DIAGNOSIS — E11.42 DIABETIC POLYNEUROPATHY ASSOCIATED WITH TYPE 2 DIABETES MELLITUS (HCC): ICD-10-CM

## 2022-09-23 DIAGNOSIS — I10 PRIMARY HYPERTENSION: Primary | ICD-10-CM

## 2022-09-23 PROCEDURE — 99214 OFFICE O/P EST MOD 30 MIN: CPT | Performed by: INTERNAL MEDICINE

## 2022-09-23 PROCEDURE — 93000 ELECTROCARDIOGRAM COMPLETE: CPT | Performed by: INTERNAL MEDICINE

## 2022-09-23 RX ORDER — ATORVASTATIN CALCIUM 40 MG/1
40 TABLET, FILM COATED ORAL DAILY
Qty: 90 TABLET | Refills: 3 | Status: SHIPPED | OUTPATIENT
Start: 2022-09-23

## 2022-09-23 RX ORDER — PREDNISOLONE ACETATE 10 MG/ML
SUSPENSION/ DROPS OPHTHALMIC
COMMUNITY
Start: 2022-09-15 | End: 2022-10-24 | Stop reason: HOSPADM

## 2022-09-23 RX ORDER — LATANOPROST 50 UG/ML
SOLUTION/ DROPS OPHTHALMIC
COMMUNITY
Start: 2022-09-06

## 2022-09-23 RX ORDER — METOPROLOL SUCCINATE 50 MG/1
50 TABLET, EXTENDED RELEASE ORAL DAILY
Qty: 90 TABLET | Refills: 3 | Status: SHIPPED | OUTPATIENT
Start: 2022-09-23

## 2022-09-23 NOTE — PROGRESS NOTES
Cardiology   Radha Lin DO, Quinn Adams MD, Winnie Cox MD, Terry Chapman MD, [facility] - [place]  -------------------------------------------------------------------  [facility] ORTHOPEDIC [facility]  [address], [address] [facility],[address], [address]  [place], [place], [address]  [phone]    Cardiology Follow Up  Med Hagan  1940  [id number]          Assessment/Plan:    1  Primary hypertension    2  Irregular heart beat    3  Diabetic polyneuropathy associated with type 2 diabetes mellitus ([address] )      - Patient with palpitations - There was a 3% PVC burden during her last   - Will continue metoprolol 50 mg daily  - continue atorvastatin        Interval History:     Med Hagan is 81 y o  female here for followup of palpitations  Since her last visit, she underwent 2 week event monitor which showed a 3 9% PVC burden  She had symptoms during monitoring which corresponded to PVCs  She had occasional SVT episodes as well  Current medication regimen includes Toprol XL 50 mg daily  Previously, she was using diltiazem which was discontinued last visit  She has intermittent palpitations  Denies any syncope or near syncope  She had hernia surgery without complications  Previously had stress test and echocardiogram in [place] which was normal      Holter monitor was done on March 10, 2022 which showed frequent PVCs which totaled 16 8% of all monitored beats without any episodes of ventricular tachycardia  There were 4 episodes of supraventricular tachycardia with the longest lasting for 5 beats  Patient also had a stress test done which showed an EF of 71% with no ischemia or infarction noted  A 2D echocardiogram showed ejection fraction of 55% with no valve disease    TSH was normal         Past Medical History:   Diagnosis Date    Arthritis     DJD- left thumb DJD,most joints    Borderline diabetes     Change in bowel habits     Chronic pain disorder     lumbar    Colon polyp     Constipation     unable to expell the stool completely with BM    Deviated septum     Diverticulosis     DJD (degenerative joint disease)     pt does get injections to the knee with Dr Monserrat Nixon GERD (gastroesophageal reflux disease)     Glaucoma     Hearing aid worn     bilateral ears for background noise    Hyperlipidemia     Irregular heart beat     "skips a beat"    Low back pain     Lumbar degenerative disc disease 2019    Muscle spasm     legs    Peripheral neuropathy     PND (post-nasal drip)     Presence of upper and lower permanent dental bridges     Sciatica     Spinal stenosis     Wears glasses      Social History     Socioeconomic History    Marital status:      Spouse name: Not on file    Number of children: Not on file    Years of education: Not on file    Highest education level: Not on file   Occupational History    Not on file   Tobacco Use    Smoking status: Former Smoker     Quit date: 1976     Years since quittin 4    Smokeless tobacco: Never Used   Vaping Use    Vaping Use: Never used   Substance and Sexual Activity    Alcohol use: No    Drug use: Yes     Types: Marijuana     Comment: medical at [address] Sexual activity: Not on file   Other Topics Concern    Not on file   Social History Narrative    Not on file     Social Determinants of Health     Financial Resource Strain: Not on file   Food Insecurity: Not on file   Transportation Needs: Not on file   Physical Activity: Not on file   Stress: Not on file   Social Connections: Not on file   Intimate Partner Violence: Not on file   Housing Stability: Not on file      Family History   Problem Relation Age of Onset    Heart disease Mother     Dementia Father     No Known Problems Sister     No Known Problems Brother     No Known Problems Maternal Aunt     No Known Problems Maternal Uncle     No Known Problems Paternal Aunt     No Known Problems Paternal Uncle     No Known Problems Maternal Grandmother     No Known Problems Maternal Grandfather     No Known Problems Paternal Grandmother     No Known Problems Paternal Grandfather      Past Surgical History:   Procedure Laterality Date    BREAST SURGERY  2005    bilateral reduction    COLON SURGERY  2014    resection with colostomy-ruptured "intestine"    COLONOSCOPY N/A 07/11/2016    Procedure: COLONOSCOPY;  Surgeon: Odalys Lomax MD;  [facility]; Service:     COLOSTOMY CLOSURE  2015    COMBINED REDUCTION MAMMAPLASTY W/ LIPOSUCTION      EPIDURAL BLOCK INJECTION N/A 11/15/2019    Procedure: L4 L5 Lumbar Epidural Steroid Injection (39674); Surgeon: Matt Padilla MD;  Location: [facility] MAIN OR;  Service: Pain Management     EPIDURAL BLOCK INJECTION N/A 03/12/2020    Procedure: L4 L5 Lumbar Epidural Steroid Injection (22471); Surgeon: Matt Padilla MD;  Location: [facility] OR;  Service: Pain Management     EPIDURAL BLOCK INJECTION N/A 07/23/2020    Procedure: L4-L5 LUMBAR EPIDURAL STEROID INJECTION (#2) (14807); Surgeon: Matt Padilla MD;  Location: [facility] MAIN OR;  Service: Pain Management     HERNIA MESH REMOVAL  06/22/2022    hernia repair, mesh removal reconstruction at [facility] Dr Jaya Oseguera  2016    x3    WI XCAPSL CTRC RMVL INSJ IO LENS PROSTH W/O ECP Right 8/22/2022    Procedure: EXTRACTION EXTRACAPSULAR CATARACT PHACO INTRAOCULAR LENS (IOL);   Surgeon: Debra Segura MD;  Location: [facility] MAIN OR;  Service: Ophthalmology       Current Outpatient Medications:     ammonium lactate (LAC-HYDRIN) 12 % cream, Apply topically as needed for dry skin, Disp: 385 g, Rfl: 0    aspirin (ECOTRIN LOW STRENGTH) 81 mg EC tablet, 81 mg every morning, Disp: , Rfl:     atorvastatin (LIPITOR) 40 mg tablet, Take 1 tablet (40 mg total) by mouth daily (Patient taking differently: Take 40 mg by mouth every evening), Disp: 90 tablet, Rfl: 3    ciprofloxacin (CILOXAN) 0 3 % ophthalmic solution, Administer 1 drop to the right eye 4 (four) times a day, Disp: 5 mL, Rfl: 0    Coenzyme Q10 (Co Q 10) 100 MG CAPS, Take by mouth daily with lunch, Disp: , Rfl:     COMFORT EZ PEN NEEDLES 31G X 6 MM MISC, daily , Disp: , Rfl:     ketorolac (ACULAR) 0 5 % ophthalmic solution, Administer 1 drop to the right eye 4 (four) times a day, Disp: 5 mL, Rfl: 0    liraglutide (VICTOZA) injection, Inject 0 6 mg under the skin daily after breakfast, Disp: , Rfl:     metoprolol succinate (TOPROL-XL) 50 mg 24 hr tablet, Take 1 tablet (50 mg total) by mouth daily (Patient taking differently: Take 50 mg by mouth daily at bedtime), Disp: 30 tablet, Rfl: 5    montelukast (SINGULAIR) 10 mg tablet, 10 mg every morning , Disp: , Rfl:     omeprazole (PriLOSEC) 40 MG capsule, Take 1 capsule (40 mg total) by mouth every morning, Disp: 90 capsule, Rfl: 0    ONE TOUCH ULTRA TEST test strip, daily as needed , Disp: , Rfl: 1    Travoprost (TRAVATAN OP), Apply to eye daily at bedtime, Disp: , Rfl:     GaviLyte-N with Flavor Pack 420 g solution, once Take as directed (Patient not taking: No sig reported), Disp: , Rfl:         Review of Systems:  Review of Systems   Constitutional: Positive for fatigue  Respiratory: Positive for shortness of breath  Cardiovascular: Positive for palpitations  Negative for chest pain and leg swelling  Musculoskeletal: Positive for arthralgias and myalgias  Neurological: Positive for light-headedness  All other systems reviewed and are negative  Physical Exam:  Vitals:  Vitals:    09/23/22 1433   Temp: 98 6 °F (37 °C)   SpO2: 96%   Weight: 91 2 kg (201 lb)   Height: 5' 4" (1 626 m)     Physical Exam   Constitutional: She appears healthy  No distress  Eyes: Pupils are equal, round, and reactive to light  Conjunctivae are normal    Musculoskeletal:         General: No edema  Neurological: She is alert and oriented to person, place, and time     Skin: Skin is warm and dry  No rash noted  Cardiographics:  EKG: Personally reviewed NSR with PACs  Last known EF: 55%    This note was completed in part utilizing M-Modal Fluency Direct Software  Grammatical errors, random word insertions, spelling mistakes, and incomplete sentences can be an occasional consequence of this system secondary to software limitations, ambient noise, and hardware issues  If you have any questions or concerns about the content, text, or information contained within the body of this dictation, please contact the provider for clarification

## 2022-10-11 DIAGNOSIS — Z20.822 COVID-19 RULED OUT BY LABORATORY TESTING: Primary | ICD-10-CM

## 2022-10-17 RX ORDER — AZELASTINE 1 MG/ML
1 SPRAY, METERED NASAL 2 TIMES DAILY PRN
COMMUNITY

## 2022-10-17 RX ORDER — VITAMIN B COMPLEX
1 CAPSULE ORAL DAILY
COMMUNITY

## 2022-10-17 RX ORDER — DIPHENOXYLATE HYDROCHLORIDE AND ATROPINE SULFATE 2.5; .025 MG/1; MG/1
1 TABLET ORAL DAILY
COMMUNITY

## 2022-10-17 NOTE — PRE-PROCEDURE INSTRUCTIONS
My Surgical Experience    The following information was developed to assist you to prepare for your operation  What do I need to do before coming to the hospital?  • Arrange for a responsible person to drive you to and from the hospital   • Arrange care for your children at home  Children are not allowed in the recovery areas of the hospital  • Plan to wear clothing that is easy to put on and take off  If you are having shoulder surgery, wear a shirt that buttons or zippers in the front  Bathing  o Shower the evening before and the morning of your surgery with an antibacterial soap  Please refer to the Pre Op Showering Instructions for Surgery Patients Sheet   o Remove nail polish and all body piercing jewelry  o Do not shave any body part for at least 24 hours before surgery-this includes face, arms, legs and upper body  Food  o Nothing to eat or drink after midnight the night before your surgery  This includes candy and chewing gum  o Exception: If your surgery is after 12:00pm (noon), you may have clear liquids such as 7-Up®, ginger ale, apple or cranberry juice, Jell-O®, water, or clear broth until 8:00 am  o Do not drink milk or juice with pulp on the morning before surgery  o Do not drink alcohol 24 hours before surgery  Medicine  o Follow instructions you received from your surgeon about which medicines you may take on the day of surgery  o If instructed to take medicine on the morning of surgery, take pills with just a small sip of water  Call your prescribing doctor for specific infroamtion on what to do if you take insulin    What should I bring to the hospital?    Bring:  • Crutches or a walker, if you have them, for foot or knee surgery  • A list of the daily medicines, vitamins, minerals, herbals and nutritional supplements you take   Include the dosages of medicines and the time you take them each day  • Glasses, dentures or hearing aids  • Minimal clothing; you will be wearing hospital sleepwear  • Photo ID; required to verify your identity  • If you have a Living Will or Power of , bring a copy of the documents  • If you have an ostomy, bring an extra pouch and any supplies you use    Do not bring  • Medicines or inhalers  • Money, valuables or jewelry    What other information should I know about the day of surgery? • Notify your surgeons if you develop a cold, sore throat, cough, fever, rash or any other illness  • Report to the Ambulatory Surgical/Same Day Surgery Unit  • You will be instructed to stop at Registration only if you have not been pre-registered  • Inform your  fi they do not stay that they will be asked by the staff to leave a phone number where they can be reached  • Be available to be reached before surgery  In the event the operating room schedule changes, you may be asked to come in earlier or later than expected    *It is important to tell your doctor and others involved in your health care if you are taking or have been taking any non-prescription drugs, vitamins, minerals, herbals or other nutritional supplements  Any of these may interact with some food or medicines and cause a reaction      Pre-Surgery Instructions:   Medication Instructions   • ammonium lactate (LAC-HYDRIN) 12 % cream Not using   • aspirin (ECOTRIN LOW STRENGTH) 81 mg EC tablet Take night before surgery   • atorvastatin (LIPITOR) 40 mg tablet Take night before surgery   • azelastine (ASTELIN) 0 1 % nasal spray Uses PRN- OK to take day of surgery   • b complex vitamins capsule Hold day of surgery  • GEE SEED PO Hold day of surgery  • ciprofloxacin (CILOXAN) 0 3 % ophthalmic solution Instructions provided by MD   • Coenzyme Q10 (Co Q 10) 100 MG CAPS Hold day of surgery  • COMFORT EZ PEN NEEDLES 31G X 6 MM MISC Hold day of surgery  • Flaxseed, Linseed, (FLAX SEEDS PO) Hold day of surgery     • ketorolac (ACULAR) 0 5 % ophthalmic solution Instructions provided by MD   • latanoprost (XALATAN) 0 005 % ophthalmic solution Take night before surgery   • liraglutide (VICTOZA) injection Hold day of surgery  • metoprolol succinate (TOPROL-XL) 50 mg 24 hr tablet Take night before surgery   • montelukast (SINGULAIR) 10 mg tablet Hold day of surgery  • multivitamin (THERAGRAN) TABS Hold day of surgery  • omeprazole (PriLOSEC) 40 MG capsule Take day of surgery  • ONE TOUCH ULTRA TEST test strip Take day of surgery  • prednisoLONE acetate (PRED FORTE) 1 % ophthalmic suspension Instructions provided by MD   • Travoprost (TRAVATAN OP) Not using   • TURMERIC PO Hold day of surgery  • VITAMIN D PO Hold day of surgery  Pre op instructions given  Pt to follow Dr Lynda Camacho instructions  Pt to arrive to the [facility] at the given time, wear a mask   unknown at time of interview

## 2022-10-21 ENCOUNTER — PROCEDURE VISIT (OUTPATIENT)
Dept: OBGYN CLINIC | Facility: CLINIC | Age: 82
End: 2022-10-21
Payer: MEDICARE

## 2022-10-21 VITALS — WEIGHT: 205 LBS | BODY MASS INDEX: 35 KG/M2 | HEIGHT: 64 IN

## 2022-10-21 DIAGNOSIS — M17.0 PRIMARY OSTEOARTHRITIS OF BOTH KNEES: Primary | ICD-10-CM

## 2022-10-21 PROCEDURE — 20610 DRAIN/INJ JOINT/BURSA W/O US: CPT | Performed by: PHYSICIAN ASSISTANT

## 2022-10-21 RX ORDER — HYALURONATE SODIUM 10 MG/ML
20 SYRINGE (ML) INTRAARTICULAR
Status: COMPLETED | OUTPATIENT
Start: 2022-10-21 | End: 2022-10-21

## 2022-10-21 RX ADMIN — Medication 20 MG: at 13:14

## 2022-10-21 NOTE — PROGRESS NOTES
Assessment/Plan:  1  Primary osteoarthritis of both knees         Follow-up 1 week  Subjective:   Ryanne Mccormick is a 81 y o  female who presents today for euflexxa #1 bilateral knees  Review of Systems      Past Medical History:   Diagnosis Date   • Arthritis     DJD- left thumb DJD,most joints   • Borderline diabetes    • Change in bowel habits    • Chronic pain disorder     lumbar   • Colon polyp    • Constipation     unable to expell the stool completely with BM   • COVID-19 2020    in the winter   • Deviated septum    • Diabetes mellitus ([address] )    • Diverticulosis     diverticulitis- with resection-colostomy then reversed   • DJD (degenerative joint disease)     pt does get injections to the knee with Dr Leia Martinez   • GERD (gastroesophageal reflux disease)    • Glaucoma    • Hearing aid worn     bilateral ears for background noise   • Hyperlipidemia    • Irregular heart beat     "skips a beat"   • Low back pain    • Lumbar degenerative disc disease 09/16/2019   • Muscle spasm     legs   • Peripheral neuropathy    • PND (post-nasal drip)    • Presence of upper and lower permanent dental bridges    • Sciatica    • Spinal stenosis    • Wears glasses        Past Surgical History:   Procedure Laterality Date   • BREAST SURGERY  2005    bilateral reduction   • COLON SURGERY  2014    resection with colostomy-ruptured "intestine"   • COLONOSCOPY N/A 07/11/2016    Procedure: COLONOSCOPY;  Surgeon: Mack Ellis MD;  Location: [facility] GI LAB; Service:    • COLOSTOMY CLOSURE  2015   • COMBINED REDUCTION MAMMAPLASTY W/ LIPOSUCTION     • EPIDURAL BLOCK INJECTION N/A 11/15/2019    Procedure: L4 L5 Lumbar Epidural Steroid Injection (34623); Surgeon: Cheri Cordon MD;  Location: [facility] MAIN OR;  Service: Pain Management    • EPIDURAL BLOCK INJECTION N/A 03/12/2020    Procedure: L4 L5 Lumbar Epidural Steroid Injection (22419);   Surgeon: Cheri Cordon MD;  Location: [facility] MAIN OR;  Service: Pain Management    • EPIDURAL BLOCK INJECTION N/A 2020    Procedure: L4-L5 LUMBAR EPIDURAL STEROID INJECTION (#2) (84185); Surgeon: Jacqui Lau MD;  Location: [facility] MAIN OR;  Service: Pain Management    • HERNIA MESH REMOVAL  2022    hernia repair, mesh removal reconstruction at [facility] Dr Jaxson Hassan   • HERNIA REPAIR  2016    x3   • IA XCAPSL CTRC RMVL INSJ IO LENS PROSTH W/O ECP Right 2022    Procedure: EXTRACTION EXTRACAPSULAR CATARACT PHACO INTRAOCULAR LENS (IOL);   Surgeon: Rika Regan MD;  Location: [facility] MAIN OR;  Service: Ophthalmology   • TUBAL LIGATION         Family History   Problem Relation Age of Onset   • Heart disease Mother    • Dementia Father    • No Known Problems Sister    • No Known Problems Brother    • No Known Problems Maternal Aunt    • No Known Problems Maternal Uncle    • No Known Problems Paternal Aunt    • No Known Problems Paternal Uncle    • No Known Problems Maternal Grandmother    • No Known Problems Maternal Grandfather    • No Known Problems Paternal Grandmother    • No Known Problems Paternal Grandfather        Social History     Occupational History   • Not on file   Tobacco Use   • Smoking status: Former Smoker     Quit date: 1976     Years since quittin 3   • Smokeless tobacco: Never Used   Vaping Use   • Vaping Use: Never used   Substance and Sexual Activity   • Alcohol use: No   • Drug use: Not Currently     Types: Marijuana     Comment: stopped in    • Sexual activity: Not on file         Current Outpatient Medications:   •  ammonium lactate (LAC-HYDRIN) 12 % cream, Apply topically as needed for dry skin, Disp: 385 g, Rfl: 0  •  aspirin (ECOTRIN LOW STRENGTH) 81 mg EC tablet, 81 mg daily at bedtime, Disp: , Rfl:   •  atorvastatin (LIPITOR) 40 mg tablet, Take 1 tablet (40 mg total) by mouth daily (Patient taking differently: Take 40 mg by mouth daily at bedtime), Disp: 90 tablet, Rfl: 3  •  azelastine (ASTELIN) 0 1 % nasal spray, 1 spray into each nostril 2 (two) times a day as needed for rhinitis Use in each nostril as directed, Disp: , Rfl:   •  b complex vitamins capsule, Take 1 capsule by mouth daily, Disp: , Rfl:   •  GEE SEED PO, Take by mouth in the morning, Disp: , Rfl:   •  ciprofloxacin (CILOXAN) 0 3 % ophthalmic solution, Administer 1 drop to the right eye 4 (four) times a day (Patient taking differently: Administer 1 drop into the left eye 4 (four) times a day), Disp: 5 mL, Rfl: 0  •  Coenzyme Q10 (Co Q 10) 100 MG CAPS, Take by mouth daily with lunch, Disp: , Rfl:   •  COMFORT EZ PEN NEEDLES 31G X 6 MM MISC, daily , Disp: , Rfl:   •  Flaxseed, Linseed, (FLAX SEEDS PO), Take by mouth in the morning, Disp: , Rfl:   •  ketorolac (ACULAR) 0 5 % ophthalmic solution, Administer 1 drop to the right eye 4 (four) times a day (Patient taking differently: Administer 1 drop into the left eye 4 (four) times a day), Disp: 5 mL, Rfl: 0  •  latanoprost (XALATAN) 0 005 % ophthalmic solution, Administer to both eyes daily at bedtime, Disp: , Rfl:   •  liraglutide (VICTOZA) injection, Inject 0 6 mg under the skin daily after breakfast, Disp: , Rfl:   •  metoprolol succinate (TOPROL-XL) 50 mg 24 hr tablet, Take 1 tablet (50 mg total) by mouth daily (Patient taking differently: Take 50 mg by mouth daily at bedtime), Disp: 90 tablet, Rfl: 3  •  montelukast (SINGULAIR) 10 mg tablet, 10 mg every morning , Disp: , Rfl:   •  multivitamin (THERAGRAN) TABS, Take 1 tablet by mouth daily, Disp: , Rfl:   •  omeprazole (PriLOSEC) 40 MG capsule, Take 1 capsule (40 mg total) by mouth every morning, Disp: 90 capsule, Rfl: 0  •  ONE TOUCH ULTRA TEST test strip, daily as needed , Disp: , Rfl: 1  •  prednisoLONE acetate (PRED FORTE) 1 % ophthalmic suspension, Administer into the left eye Post op, Disp: , Rfl:   •  Travoprost (TRAVATAN OP), Apply to eye daily at bedtime Both eyes, Disp: , Rfl:   •  TURMERIC PO, Take by mouth in the morning, Disp: , Rfl:   •  VITAMIN D PO, Take by mouth in the morning, Disp: , Rfl:     Allergies   Allergen Reactions   • Metformin Vomiting     N/V, dizziness       Objective: There were no vitals filed for this visit  Ortho Exam    Physical Exam    Large joint arthrocentesis: L knee  Universal Protocol:  Risks and benefits: risks, benefits and alternatives were discussed  Consent given by: patient  Timeout called at: 10/21/2022 1:03 PM   Site marked: the operative site was marked  Supporting Documentation  Indications: pain   Procedure Details  Location: knee - L knee  Preparation: Patient was prepped and draped in the usual sterile fashion (Alcohol prep)  Needle size: 22 G  Ultrasound guidance: no  Approach: anterolateral  Medications administered: 20 mg Sodium Hyaluronate 20 MG/2ML    Patient tolerance: patient tolerated the procedure well with no immediate complications  Dressing:  Sterile dressing applied    Large joint arthrocentesis: R knee  Universal Protocol:  Risks and benefits: risks, benefits and alternatives were discussed  Consent given by: patient  Timeout called at: 10/21/2022 1:03 PM   Site marked: the operative site was marked  Supporting Documentation  Indications: pain   Procedure Details  Location: knee - R knee  Preparation: Patient was prepped and draped in the usual sterile fashion (Alcohol prep)  Needle size: 22 G  Ultrasound guidance: no  Approach: anterolateral  Medications administered: 20 mg Sodium Hyaluronate 20 MG/2ML    Patient tolerance: patient tolerated the procedure well with no immediate complications  Dressing:  Sterile dressing applied              This document was created using speech voice recognition software  Grammatical errors, random word insertions, pronoun errors, and incomplete sentences are an occasional consequence of this system due to software limitations, ambient noise, and hardware issues     Any formal questions or concerns about content, text, or information contained within the body of this dictation should be directly addressed to the provider for clarification

## 2022-10-24 ENCOUNTER — ANESTHESIA EVENT (OUTPATIENT)
Dept: PERIOP | Facility: AMBULARY SURGERY CENTER | Age: 82
End: 2022-10-24
Payer: MEDICARE

## 2022-10-24 ENCOUNTER — HOSPITAL ENCOUNTER (OUTPATIENT)
Facility: AMBULARY SURGERY CENTER | Age: 82
Setting detail: OUTPATIENT SURGERY
Discharge: HOME | End: 2022-10-24
Attending: OPHTHALMOLOGY | Admitting: OPHTHALMOLOGY
Payer: MEDICARE

## 2022-10-24 ENCOUNTER — ANESTHESIA (OUTPATIENT)
Dept: PERIOP | Facility: AMBULARY SURGERY CENTER | Age: 82
End: 2022-10-24
Payer: MEDICARE

## 2022-10-24 VITALS
TEMPERATURE: 97.2 F | BODY MASS INDEX: 35 KG/M2 | HEART RATE: 69 BPM | SYSTOLIC BLOOD PRESSURE: 186 MMHG | DIASTOLIC BLOOD PRESSURE: 86 MMHG | HEIGHT: 64 IN | RESPIRATION RATE: 18 BRPM | WEIGHT: 205 LBS | OXYGEN SATURATION: 94 %

## 2022-10-24 PROCEDURE — V2632 POST CHMBR INTRAOCULAR LENS: HCPCS | Performed by: OPHTHALMOLOGY

## 2022-10-24 DEVICE — ACRYSOF(R) IQ ASPHERIC NATURAL IOL, SINGLE-PIECE ACRYLIC FOLDABLE PCL, UV WITH BLUE LIGHTFILTER, 13.0MM LENGTH, 6.0MM ANTERIORASYMMETRIC BICONVEX OPTIC, PLANAR HAPTICS.
Type: IMPLANTABLE DEVICE | Site: EYE | Status: FUNCTIONAL
Brand: ACRYSOF®

## 2022-10-24 RX ORDER — MIDAZOLAM HYDROCHLORIDE 2 MG/2ML
INJECTION, SOLUTION INTRAMUSCULAR; INTRAVENOUS AS NEEDED
Status: DISCONTINUED | OUTPATIENT
Start: 2022-10-24 | End: 2022-10-24

## 2022-10-24 RX ORDER — PHENYLEPHRINE HCL 2.5 %
1 DROPS OPHTHALMIC (EYE)
Status: COMPLETED | OUTPATIENT
Start: 2022-10-24 | End: 2022-10-24

## 2022-10-24 RX ORDER — TETRACAINE HYDROCHLORIDE 5 MG/ML
1 SOLUTION OPHTHALMIC ONCE
Status: COMPLETED | OUTPATIENT
Start: 2022-10-24 | End: 2022-10-24

## 2022-10-24 RX ORDER — KETOROLAC TROMETHAMINE 5 MG/ML
1 SOLUTION OPHTHALMIC
Status: COMPLETED | OUTPATIENT
Start: 2022-10-24 | End: 2022-10-24

## 2022-10-24 RX ORDER — CYCLOPENTOLATE HYDROCHLORIDE 10 MG/ML
1 SOLUTION/ DROPS OPHTHALMIC
Status: COMPLETED | OUTPATIENT
Start: 2022-10-24 | End: 2022-10-24

## 2022-10-24 RX ORDER — TETRACAINE HYDROCHLORIDE 5 MG/ML
SOLUTION OPHTHALMIC AS NEEDED
Status: DISCONTINUED | OUTPATIENT
Start: 2022-10-24 | End: 2022-10-24 | Stop reason: HOSPADM

## 2022-10-24 RX ORDER — BALANCED SALT SOLUTION 6.4; .75; .48; .3; 3.9; 1.7 MG/ML; MG/ML; MG/ML; MG/ML; MG/ML; MG/ML
SOLUTION OPHTHALMIC AS NEEDED
Status: DISCONTINUED | OUTPATIENT
Start: 2022-10-24 | End: 2022-10-24 | Stop reason: HOSPADM

## 2022-10-24 RX ORDER — GATIFLOXACIN 5 MG/ML
SOLUTION/ DROPS OPHTHALMIC AS NEEDED
Status: DISCONTINUED | OUTPATIENT
Start: 2022-10-24 | End: 2022-10-24 | Stop reason: HOSPADM

## 2022-10-24 RX ORDER — LIDOCAINE HYDROCHLORIDE 20 MG/ML
1 JELLY TOPICAL
Status: COMPLETED | OUTPATIENT
Start: 2022-10-24 | End: 2022-10-24

## 2022-10-24 RX ORDER — LIDOCAINE HYDROCHLORIDE 10 MG/ML
INJECTION, SOLUTION EPIDURAL; INFILTRATION; INTRACAUDAL; PERINEURAL AS NEEDED
Status: DISCONTINUED | OUTPATIENT
Start: 2022-10-24 | End: 2022-10-24 | Stop reason: HOSPADM

## 2022-10-24 RX ADMIN — TETRACAINE HYDROCHLORIDE 1 DROP: 5 SOLUTION OPHTHALMIC at 08:15

## 2022-10-24 RX ADMIN — PHENYLEPHRINE HYDROCHLORIDE 1 DROP: 25 SOLUTION/ DROPS OPHTHALMIC at 08:15

## 2022-10-24 RX ADMIN — LIDOCAINE HYDROCHLORIDE 1 APPLICATION: 20 JELLY TOPICAL at 08:45

## 2022-10-24 RX ADMIN — KETOROLAC TROMETHAMINE 1 DROP: 5 SOLUTION OPHTHALMIC at 08:30

## 2022-10-24 RX ADMIN — KETOROLAC TROMETHAMINE 1 DROP: 5 SOLUTION OPHTHALMIC at 09:00

## 2022-10-24 RX ADMIN — CYCLOPENTOLATE HYDROCHLORIDE 1 DROP: 10 SOLUTION/ DROPS OPHTHALMIC at 08:30

## 2022-10-24 RX ADMIN — CYCLOPENTOLATE HYDROCHLORIDE 1 DROP: 10 SOLUTION/ DROPS OPHTHALMIC at 08:45

## 2022-10-24 RX ADMIN — CYCLOPENTOLATE HYDROCHLORIDE 1 DROP: 10 SOLUTION/ DROPS OPHTHALMIC at 09:00

## 2022-10-24 RX ADMIN — LIDOCAINE HYDROCHLORIDE 1 APPLICATION: 20 JELLY TOPICAL at 08:30

## 2022-10-24 RX ADMIN — MIDAZOLAM 2 MG: 1 INJECTION INTRAMUSCULAR; INTRAVENOUS at 09:16

## 2022-10-24 RX ADMIN — KETOROLAC TROMETHAMINE 1 DROP: 5 SOLUTION OPHTHALMIC at 08:15

## 2022-10-24 RX ADMIN — LIDOCAINE HYDROCHLORIDE 1 APPLICATION: 20 JELLY TOPICAL at 08:15

## 2022-10-24 RX ADMIN — CYCLOPENTOLATE HYDROCHLORIDE 1 DROP: 10 SOLUTION/ DROPS OPHTHALMIC at 08:15

## 2022-10-24 RX ADMIN — KETOROLAC TROMETHAMINE 1 DROP: 5 SOLUTION OPHTHALMIC at 08:45

## 2022-10-24 RX ADMIN — PHENYLEPHRINE HYDROCHLORIDE 1 DROP: 25 SOLUTION/ DROPS OPHTHALMIC at 08:30

## 2022-10-24 RX ADMIN — PHENYLEPHRINE HYDROCHLORIDE 1 DROP: 25 SOLUTION/ DROPS OPHTHALMIC at 09:00

## 2022-10-24 RX ADMIN — PHENYLEPHRINE HYDROCHLORIDE 1 DROP: 25 SOLUTION/ DROPS OPHTHALMIC at 08:45

## 2022-10-24 NOTE — ANESTHESIA PREPROCEDURE EVALUATION
Procedure:  EXTRACTION EXTRACAPSULAR CATARACT PHACO INTRAOCULAR LENS (IOL) (Left Eye)    Relevant Problems   CARDIO   (+) Benign essential hypertension      GI/HEPATIC   (+) GERD (gastroesophageal reflux disease)      MUSCULOSKELETAL   (+) Chronic bilateral low back pain with bilateral sciatica   (+) Low back pain   (+) Lumbar degenerative disc disease      NEURO/PSYCH   (+) Chronic bilateral low back pain with bilateral sciatica   (+) Chronic pain syndrome   (+) Diabetic polyneuropathy associated with type 2 diabetes mellitus (HCC)        Physical Exam    Airway    Mallampati score: II  TM Distance: >3 FB  Neck ROM: full     Dental   No notable dental hx     Cardiovascular      Pulmonary      Other Findings        Anesthesia Plan  ASA Score- 2     Anesthesia Type- IV sedation with anesthesia with ASA Monitors  Additional Monitors:   Airway Plan:           Plan Factors-Exercise tolerance (METS): <4 METS  Chart reviewed  Existing labs reviewed  Patient summary reviewed  Patient is not a current smoker  Induction- intravenous  Postoperative Plan-     Informed Consent- Anesthetic plan and risks discussed with patient  I personally reviewed this patient with the CRNA  Discussed and agreed on the Anesthesia Plan with the CRNA  Dae Rodriguez

## 2022-10-24 NOTE — PERIOPERATIVE NURSING NOTE
Patient states she will be staying at [facility] to transport patient from [place] [address] to patients home  Nurses spoke with friend to confirm arrangements post procedure

## 2022-10-24 NOTE — OP NOTE
OPERATIVE REPORT    PATIENT NAME: Mita Acosta    :  1940  MRN: [id number]  Pt Location: [facility] OR ROOM 01    Surgery Date: 10/24/2022    Surgeon(s) and Role:     * Sloan Hughes MD - Primary    Age-related nuclear cataract, left eye [H25 12]    Post-Op Diagnosis Codes:     * Age-related nuclear cataract, left eye [H25 12]    Procedure(s):  EXTRACTION EXTRACAPSULAR CATARACT PHACO INTRAOCULAR LENS (IOL)    Anesthesia Type:   IV Sedation with Anesthesia    Operative Indications:  Age-related nuclear cataract, left eye [H25 12]  Decreased vision to 20/40  With problems driving  Pt requested cataract sx the left eye    Procedure and Technique:    Procedure Details     The patient was brought in the OR in stable condition and placed on the operative table  The left eye was prepped and draped in the usual sterile manner  Attention was directed to the left eye where a lid speculum was placed  A 2 4 mm clear corneal incision was made temperally  1/2 cc of 1% MPF Lidocaine was irrigated into the anterior chamber followed by viscoat  The side port incision was placed superiorly  The capsularrhexis was made and the nucleus was hydrodissected with BSS  The nucleus was then removed with the phaco handpiece followed by removal of the cortical material with the I/A handpiece  The capsular bag was then filled with Provisc  The IOL was folded and placed in to the capsular bag and centered well  The remaining Provisc was removed from the eye with the I/A  The wounds were hydrated with BSS and found to be water tight  The lid speculum was removed and 2 drops of Gatifloxicin were placed over the cornea  A protective eye shield was taped over the eye and the patient went to PACU in stable condition  I will see the patient in the office tomorrow and the expected post op period is a few weeks         Complications: None        Disposition: PACU   Condition: Stable    SIGNATURE: Sloan Hughes MD  DATE:  2022  TIME: 9:43 AM

## 2022-10-24 NOTE — DISCHARGE INSTRUCTIONS
Dr Bubba Rider Cataract Instructions    Activity:     1  No Driving until instructed   2  Keep shield on until seen tomorrow except when administering drops   3  No heavy lifting   4  No water in eye     Diet:     1  Resume normal diet    Normal Symptoms:     1  Mild Headache   2  Scratchy or picky feeling around eye    Call the office if:     1  You have any questions or concerns   2  If eye pain is not relieved by extra strength tylenol    Office phone number:  [phone]      Next appointment:     1  See Dr Bubba Rider at his office tomorrow as scheduled   _____11:15 am________   2  Bring blue eye kit with you and eyedrops to the office    A new set of comprehensive instructions will be given and reviewed with you during your office visit tomorrow

## 2022-10-24 NOTE — ANESTHESIA POSTPROCEDURE EVALUATION
Post-Op Assessment Note    CV Status:  Stable       Mental Status:  Sleepy   Hydration Status:  Stable   PONV Controlled:  Controlled   Airway Patency:  Patent      Post Op Vitals Reviewed: Yes      Staff: CRNA         No complications documented      BP   170/94   Temp     Pulse  61   Resp   20   SpO2   97

## 2022-10-28 ENCOUNTER — PROCEDURE VISIT (OUTPATIENT)
Dept: OBGYN CLINIC | Facility: CLINIC | Age: 82
End: 2022-10-28

## 2022-10-28 VITALS — WEIGHT: 205 LBS | HEIGHT: 64 IN | BODY MASS INDEX: 35 KG/M2

## 2022-10-28 DIAGNOSIS — M17.0 PRIMARY OSTEOARTHRITIS OF BOTH KNEES: Primary | ICD-10-CM

## 2022-10-28 RX ORDER — HYALURONATE SODIUM 10 MG/ML
20 SYRINGE (ML) INTRAARTICULAR
Status: COMPLETED | OUTPATIENT
Start: 2022-10-28 | End: 2022-10-28

## 2022-10-28 RX ADMIN — Medication 20 MG: at 13:08

## 2022-10-28 NOTE — PROGRESS NOTES
Assessment/Plan:  1  Primary osteoarthritis of both knees         Follow-up 1 week  Subjective:   Noel Aguirre is a 81 y o  female who presents today for euflexxa #2 bilateral knees  Review of Systems      Past Medical History:   Diagnosis Date   • Arthritis     DJD- left thumb DJD,most joints   • Borderline diabetes    • Change in bowel habits    • Chronic pain disorder     lumbar   • Colon polyp    • Constipation     unable to expell the stool completely with BM   • COVID-19 2020    in the winter   • Deviated septum    • Diabetes mellitus ([facility] [address] )    • Diverticulosis     diverticulitis- with resection-colostomy then reversed   • DJD (degenerative joint disease)     pt does get injections to the knee with Dr Phuong Silver   • GERD (gastroesophageal reflux disease)    • Glaucoma    • Hearing aid worn     bilateral ears for background noise   • Hyperlipidemia    • Irregular heart beat     "skips a beat"   • Low back pain    • Lumbar degenerative disc disease 09/16/2019   • Muscle spasm     legs   • Peripheral neuropathy    • PND (post-nasal drip)    • Presence of upper and lower permanent dental bridges    • Sciatica    • Spinal stenosis    • Wears glasses        Past Surgical History:   Procedure Laterality Date   • BREAST SURGERY  2005    bilateral reduction   • COLON SURGERY  2014    resection with colostomy-ruptured "intestine"   • COLONOSCOPY N/A 07/11/2016    Procedure: COLONOSCOPY;  Surgeon: Fredy Krishnan MD;  Location: [facility] GI LAB; Service:    • COLOSTOMY CLOSURE  2015   • COMBINED REDUCTION MAMMAPLASTY W/ LIPOSUCTION     • EPIDURAL BLOCK INJECTION N/A 11/15/2019    Procedure: L4 L5 Lumbar Epidural Steroid Injection (53577); Surgeon: Stefanie Renee MD;  Location: [facility] MAIN OR;  Service: Pain Management    • EPIDURAL BLOCK INJECTION N/A 03/12/2020    Procedure: L4 L5 Lumbar Epidural Steroid Injection (93727);   Surgeon: Stefanie Renee MD;  Location: [facility] MAIN OR;  Service: Pain Management    • EPIDURAL BLOCK INJECTION N/A 2020    Procedure: L4-L5 LUMBAR EPIDURAL STEROID INJECTION (#2) (55706); Surgeon: Marylou Kemp MD;  Location: [facility] MAIN OR;  Service: Pain Management    • HERNIA MESH REMOVAL  2022    hernia repair, mesh removal reconstruction at [facility] Dr Keena Fernandes   • HERNIA REPAIR  2016    x3   • AR XCAPSL CTRC RMVL INSJ IO LENS PROSTH W/O ECP Right 2022    Procedure: EXTRACTION EXTRACAPSULAR CATARACT PHACO INTRAOCULAR LENS (IOL); Surgeon: Walter Joyner MD;  Location: [facility] MAIN OR;  Service: Ophthalmology   • AR XCAPSL CTRC RMVL INSJ IO LENS PROSTH W/O ECP Left 10/24/2022    Procedure: EXTRACTION EXTRACAPSULAR CATARACT PHACO INTRAOCULAR LENS (IOL);   Surgeon: Walter Joyner MD;  Location: [facility] MAIN OR;  Service: Ophthalmology   • TUBAL LIGATION         Family History   Problem Relation Age of Onset   • Heart disease Mother    • Dementia Father    • No Known Problems Sister    • No Known Problems Brother    • No Known Problems Maternal Aunt    • No Known Problems Maternal Uncle    • No Known Problems Paternal Aunt    • No Known Problems Paternal Uncle    • No Known Problems Maternal Grandmother    • No Known Problems Maternal Grandfather    • No Known Problems Paternal Grandmother    • No Known Problems Paternal Grandfather        Social History     Occupational History   • Not on file   Tobacco Use   • Smoking status: Former Smoker     Quit date: 1976     Years since quittin 3   • Smokeless tobacco: Never Used   Vaping Use   • Vaping Use: Never used   Substance and Sexual Activity   • Alcohol use: No   • Drug use: Not Currently     Types: Marijuana     Comment: stopped in    • Sexual activity: Not on file         Current Outpatient Medications:   •  ammonium lactate (LAC-HYDRIN) 12 % cream, Apply topically as needed for dry skin, Disp: 385 g, Rfl: 0  •  aspirin (ECOTRIN LOW STRENGTH) 81 mg EC tablet, 81 mg daily at bedtime, Disp: , Rfl:   •  atorvastatin (LIPITOR) 40 mg tablet, Take 1 tablet (40 mg total) by mouth daily (Patient taking differently: Take 40 mg by mouth daily at bedtime), Disp: 90 tablet, Rfl: 3  •  azelastine (ASTELIN) 0 1 % nasal spray, 1 spray into each nostril 2 (two) times a day as needed for rhinitis Use in each nostril as directed, Disp: , Rfl:   •  b complex vitamins capsule, Take 1 capsule by mouth daily, Disp: , Rfl:   •  GEE SEED PO, Take by mouth in the morning, Disp: , Rfl:   •  ciprofloxacin (CILOXAN) 0 3 % ophthalmic solution, Administer 1 drop to the right eye 4 (four) times a day (Patient taking differently: Administer 1 drop into the left eye 4 (four) times a day), Disp: 5 mL, Rfl: 0  •  Coenzyme Q10 (Co Q 10) 100 MG CAPS, Take by mouth daily with lunch, Disp: , Rfl:   •  COMFORT EZ PEN NEEDLES 31G X 6 MM MISC, daily , Disp: , Rfl:   •  Flaxseed, Linseed, (FLAX SEEDS PO), Take by mouth in the morning, Disp: , Rfl:   •  ketorolac (ACULAR) 0 5 % ophthalmic solution, Administer 1 drop to the right eye 4 (four) times a day (Patient taking differently: Administer 1 drop into the left eye 4 (four) times a day), Disp: 5 mL, Rfl: 0  •  latanoprost (XALATAN) 0 005 % ophthalmic solution, Administer to both eyes daily at bedtime, Disp: , Rfl:   •  liraglutide (VICTOZA) injection, Inject 0 6 mg under the skin daily after breakfast, Disp: , Rfl:   •  metoprolol succinate (TOPROL-XL) 50 mg 24 hr tablet, Take 1 tablet (50 mg total) by mouth daily (Patient taking differently: Take 50 mg by mouth daily at bedtime), Disp: 90 tablet, Rfl: 3  •  montelukast (SINGULAIR) 10 mg tablet, 10 mg every morning , Disp: , Rfl:   •  multivitamin (THERAGRAN) TABS, Take 1 tablet by mouth daily, Disp: , Rfl:   •  omeprazole (PriLOSEC) 40 MG capsule, Take 1 capsule (40 mg total) by mouth every morning, Disp: 90 capsule, Rfl: 0  •  ONE TOUCH ULTRA TEST test strip, daily as needed , Disp: , Rfl: 1  •  TURMERIC PO, Take by mouth in the morning, Disp: , Rfl:   •  VITAMIN D PO, Take by mouth in the morning, Disp: , Rfl:     Allergies   Allergen Reactions   • Metformin Vomiting     N/V, dizziness       Objective: There were no vitals filed for this visit  Ortho Exam    Physical Exam    Large joint arthrocentesis: L knee  Universal Protocol:  Risks and benefits: risks, benefits and alternatives were discussed  Consent given by: patient  Timeout called at: 10/28/2022 1:07 PM   Site marked: the operative site was marked  Supporting Documentation  Indications: pain   Procedure Details  Location: knee - L knee  Preparation: Patient was prepped and draped in the usual sterile fashion (Alcohol prep)  Needle size: 22 G  Ultrasound guidance: no  Approach: anterolateral  Medications administered: 20 mg Sodium Hyaluronate 20 MG/2ML    Patient tolerance: patient tolerated the procedure well with no immediate complications  Dressing:  Sterile dressing applied    Large joint arthrocentesis: R knee  Universal Protocol:  Risks and benefits: risks, benefits and alternatives were discussed  Consent given by: patient  Timeout called at: 10/28/2022 1:07 PM   Site marked: the operative site was marked  Supporting Documentation  Indications: pain   Procedure Details  Location: knee - R knee  Preparation: Patient was prepped and draped in the usual sterile fashion (Alcohol prep)  Needle size: 22 G  Ultrasound guidance: no  Approach: anterolateral  Medications administered: 20 mg Sodium Hyaluronate 20 MG/2ML    Patient tolerance: patient tolerated the procedure well with no immediate complications  Dressing:  Sterile dressing applied            This document was created using speech voice recognition software  Grammatical errors, random word insertions, pronoun errors, and incomplete sentences are an occasional consequence of this system due to software limitations, ambient noise, and hardware issues     Any formal questions or concerns about content, text, or information contained within the body of this dictation should be directly addressed to the provider for clarification

## 2022-11-04 ENCOUNTER — PROCEDURE VISIT (OUTPATIENT)
Dept: OBGYN CLINIC | Facility: CLINIC | Age: 82
End: 2022-11-04

## 2022-11-04 VITALS — HEIGHT: 64 IN | BODY MASS INDEX: 35 KG/M2 | WEIGHT: 205 LBS

## 2022-11-04 DIAGNOSIS — M17.0 PRIMARY OSTEOARTHRITIS OF BOTH KNEES: Primary | ICD-10-CM

## 2022-11-04 RX ORDER — HYALURONATE SODIUM 10 MG/ML
20 SYRINGE (ML) INTRAARTICULAR
Status: COMPLETED | OUTPATIENT
Start: 2022-11-04 | End: 2022-11-04

## 2022-11-04 RX ADMIN — Medication 20 MG: at 13:17

## 2022-11-04 NOTE — PROGRESS NOTES
Assessment/Plan:  1  Primary osteoarthritis of both knees         Follow-up prn  Subjective:   Dima Goodwin is a 81 y o  female who presents today for euflexxa #3 bilateral knees  Review of Systems      Past Medical History:   Diagnosis Date   • Arthritis     DJD- left thumb DJD,most joints   • Borderline diabetes    • Change in bowel habits    • Chronic pain disorder     lumbar   • Colon polyp    • Constipation     unable to expell the stool completely with BM   • COVID-19 2020    in the winter   • Deviated septum    • Diabetes mellitus ([facility] [address] )    • Diverticulosis     diverticulitis- with resection-colostomy then reversed   • DJD (degenerative joint disease)     pt does get injections to the knee with Dr Fox Lin   • GERD (gastroesophageal reflux disease)    • Glaucoma    • Hearing aid worn     bilateral ears for background noise   • Hyperlipidemia    • Irregular heart beat     "skips a beat"   • Low back pain    • Lumbar degenerative disc disease 09/16/2019   • Muscle spasm     legs   • Peripheral neuropathy    • PND (post-nasal drip)    • Presence of upper and lower permanent dental bridges    • Sciatica    • Spinal stenosis    • Wears glasses        Past Surgical History:   Procedure Laterality Date   • BREAST SURGERY  2005    bilateral reduction   • COLON SURGERY  2014    resection with colostomy-ruptured "intestine"   • COLONOSCOPY N/A 07/11/2016    Procedure: COLONOSCOPY;  Surgeon: Yarelis Alexander MD;  Location: [facility] GI LAB; Service:    • COLOSTOMY CLOSURE  2015   • COMBINED REDUCTION MAMMAPLASTY W/ LIPOSUCTION     • EPIDURAL BLOCK INJECTION N/A 11/15/2019    Procedure: L4 L5 Lumbar Epidural Steroid Injection (11960); Surgeon: Krystian Claudio MD;  Location: [facility] MAIN OR;  Service: Pain Management    • EPIDURAL BLOCK INJECTION N/A 03/12/2020    Procedure: L4 L5 Lumbar Epidural Steroid Injection (09304);   Surgeon: Krystian Claudio MD;  Location: [facility] MAIN OR;  Service: Pain Management    • EPIDURAL BLOCK INJECTION N/A 2020    Procedure: L4-L5 LUMBAR EPIDURAL STEROID INJECTION (#2) (53744); Surgeon: Flor Chin MD;  Location: [facility] MAIN OR;  Service: Pain Management    • HERNIA MESH REMOVAL  2022    hernia repair, mesh removal reconstruction at [facility] Dr Yolanda Perez   • HERNIA REPAIR  2016    x3   • TX XCAPSL CTRC RMVL INSJ IO LENS PROSTH W/O ECP Right 2022    Procedure: EXTRACTION EXTRACAPSULAR CATARACT PHACO INTRAOCULAR LENS (IOL); Surgeon: Vivian Garnett MD;  Location: [facility] MAIN OR;  Service: Ophthalmology   • TX XCAPSL CTRC RMVL INSJ IO LENS PROSTH W/O ECP Left 10/24/2022    Procedure: EXTRACTION EXTRACAPSULAR CATARACT PHACO INTRAOCULAR LENS (IOL);   Surgeon: Vivian Garnett MD;  Location: [facility] MAIN OR;  Service: Ophthalmology   • TUBAL LIGATION         Family History   Problem Relation Age of Onset   • Heart disease Mother    • Dementia Father    • No Known Problems Sister    • No Known Problems Brother    • No Known Problems Maternal Aunt    • No Known Problems Maternal Uncle    • No Known Problems Paternal Aunt    • No Known Problems Paternal Uncle    • No Known Problems Maternal Grandmother    • No Known Problems Maternal Grandfather    • No Known Problems Paternal Grandmother    • No Known Problems Paternal Grandfather        Social History     Occupational History   • Not on file   Tobacco Use   • Smoking status: Former Smoker     Quit date: 1976     Years since quittin 3   • Smokeless tobacco: Never Used   Vaping Use   • Vaping Use: Never used   Substance and Sexual Activity   • Alcohol use: No   • Drug use: Not Currently     Types: Marijuana     Comment: stopped in    • Sexual activity: Not on file         Current Outpatient Medications:   •  ammonium lactate (LAC-HYDRIN) 12 % cream, Apply topically as needed for dry skin, Disp: 385 g, Rfl: 0  •  aspirin (ECOTRIN LOW STRENGTH) 81 mg EC tablet, 81 mg daily at bedtime, Disp: , Rfl:   •  atorvastatin (LIPITOR) 40 mg tablet, Take 1 tablet (40 mg total) by mouth daily (Patient taking differently: Take 40 mg by mouth daily at bedtime), Disp: 90 tablet, Rfl: 3  •  azelastine (ASTELIN) 0 1 % nasal spray, 1 spray into each nostril 2 (two) times a day as needed for rhinitis Use in each nostril as directed, Disp: , Rfl:   •  b complex vitamins capsule, Take 1 capsule by mouth daily, Disp: , Rfl:   •  GEE SEED PO, Take by mouth in the morning, Disp: , Rfl:   •  ciprofloxacin (CILOXAN) 0 3 % ophthalmic solution, Administer 1 drop to the right eye 4 (four) times a day (Patient taking differently: Administer 1 drop into the left eye 4 (four) times a day), Disp: 5 mL, Rfl: 0  •  Coenzyme Q10 (Co Q 10) 100 MG CAPS, Take by mouth daily with lunch, Disp: , Rfl:   •  COMFORT EZ PEN NEEDLES 31G X 6 MM MISC, daily , Disp: , Rfl:   •  Flaxseed, Linseed, (FLAX SEEDS PO), Take by mouth in the morning, Disp: , Rfl:   •  ketorolac (ACULAR) 0 5 % ophthalmic solution, Administer 1 drop to the right eye 4 (four) times a day (Patient taking differently: Administer 1 drop into the left eye 4 (four) times a day), Disp: 5 mL, Rfl: 0  •  latanoprost (XALATAN) 0 005 % ophthalmic solution, Administer to both eyes daily at bedtime, Disp: , Rfl:   •  liraglutide (VICTOZA) injection, Inject 0 6 mg under the skin daily after breakfast, Disp: , Rfl:   •  metoprolol succinate (TOPROL-XL) 50 mg 24 hr tablet, Take 1 tablet (50 mg total) by mouth daily (Patient taking differently: Take 50 mg by mouth daily at bedtime), Disp: 90 tablet, Rfl: 3  •  montelukast (SINGULAIR) 10 mg tablet, 10 mg every morning , Disp: , Rfl:   •  multivitamin (THERAGRAN) TABS, Take 1 tablet by mouth daily, Disp: , Rfl:   •  omeprazole (PriLOSEC) 40 MG capsule, Take 1 capsule (40 mg total) by mouth every morning, Disp: 90 capsule, Rfl: 0  •  ONE TOUCH ULTRA TEST test strip, daily as needed , Disp: , Rfl: 1  •  TURMERIC PO, Take by mouth in the morning, Disp: , Rfl:   •  VITAMIN D PO, Take by mouth in the morning, Disp: , Rfl:     Allergies   Allergen Reactions   • Metformin Vomiting     N/V, dizziness       Objective: There were no vitals filed for this visit  Ortho Exam    Physical Exam    Large joint arthrocentesis: L knee  Universal Protocol:  Risks and benefits: risks, benefits and alternatives were discussed  Consent given by: patient  Timeout called at: 11/4/2022 1:16 PM   Site marked: the operative site was marked  Supporting Documentation  Indications: pain   Procedure Details  Location: knee - L knee  Preparation: Patient was prepped and draped in the usual sterile fashion (Alcohol prep)  Needle size: 22 G  Ultrasound guidance: no  Approach: anterolateral  Medications administered: 20 mg Sodium Hyaluronate 20 MG/2ML    Patient tolerance: patient tolerated the procedure well with no immediate complications  Dressing:  Sterile dressing applied    Large joint arthrocentesis: R knee  Universal Protocol:  Risks and benefits: risks, benefits and alternatives were discussed  Consent given by: patient  Timeout called at: 11/4/2022 1:16 PM   Site marked: the operative site was marked  Supporting Documentation  Indications: pain   Procedure Details  Location: knee - R knee  Preparation: Patient was prepped and draped in the usual sterile fashion (Alcohol prep)  Needle size: 22 G  Ultrasound guidance: no  Approach: anterolateral  Medications administered: 20 mg Sodium Hyaluronate 20 MG/2ML    Patient tolerance: patient tolerated the procedure well with no immediate complications  Dressing:  Sterile dressing applied              This document was created using speech voice recognition software  Grammatical errors, random word insertions, pronoun errors, and incomplete sentences are an occasional consequence of this system due to software limitations, ambient noise, and hardware issues     Any formal questions or concerns about content, text, or information contained within the body of this dictation should be directly addressed to the provider for clarification

## 2023-02-23 ENCOUNTER — OFFICE VISIT (OUTPATIENT)
Dept: OBGYN CLINIC | Facility: CLINIC | Age: 83
End: 2023-02-23

## 2023-02-23 ENCOUNTER — APPOINTMENT (OUTPATIENT)
Dept: RADIOLOGY | Facility: CLINIC | Age: 83
End: 2023-02-23

## 2023-02-23 VITALS
DIASTOLIC BLOOD PRESSURE: 80 MMHG | BODY MASS INDEX: 35.51 KG/M2 | SYSTOLIC BLOOD PRESSURE: 130 MMHG | WEIGHT: 208 LBS | HEART RATE: 74 BPM | HEIGHT: 64 IN

## 2023-02-23 DIAGNOSIS — M54.42 CHRONIC BILATERAL LOW BACK PAIN WITH BILATERAL SCIATICA: ICD-10-CM

## 2023-02-23 DIAGNOSIS — M25.561 CHRONIC PAIN OF BOTH KNEES: ICD-10-CM

## 2023-02-23 DIAGNOSIS — M17.0 PRIMARY OSTEOARTHRITIS OF BOTH KNEES: Primary | ICD-10-CM

## 2023-02-23 DIAGNOSIS — K21.9 GASTROESOPHAGEAL REFLUX DISEASE, UNSPECIFIED WHETHER ESOPHAGITIS PRESENT: ICD-10-CM

## 2023-02-23 DIAGNOSIS — G89.29 CHRONIC PAIN OF BOTH KNEES: ICD-10-CM

## 2023-02-23 DIAGNOSIS — R35.0 INCREASED FREQUENCY OF URINATION: ICD-10-CM

## 2023-02-23 DIAGNOSIS — M25.562 CHRONIC PAIN OF BOTH KNEES: ICD-10-CM

## 2023-02-23 DIAGNOSIS — R00.2 PALPITATION: ICD-10-CM

## 2023-02-23 DIAGNOSIS — Z01.818 PRE-OPERATIVE EXAMINATION: ICD-10-CM

## 2023-02-23 DIAGNOSIS — E11.42 DIABETIC POLYNEUROPATHY ASSOCIATED WITH TYPE 2 DIABETES MELLITUS (HCC): ICD-10-CM

## 2023-02-23 DIAGNOSIS — G89.29 CHRONIC BILATERAL LOW BACK PAIN WITH BILATERAL SCIATICA: ICD-10-CM

## 2023-02-23 DIAGNOSIS — M17.0 PRIMARY OSTEOARTHRITIS OF BOTH KNEES: ICD-10-CM

## 2023-02-23 DIAGNOSIS — M54.12 CERVICAL RADICULOPATHY: ICD-10-CM

## 2023-02-23 DIAGNOSIS — E66.01 OBESITY, MORBID (HCC): ICD-10-CM

## 2023-02-23 DIAGNOSIS — I10 BENIGN ESSENTIAL HYPERTENSION: ICD-10-CM

## 2023-02-23 DIAGNOSIS — M54.41 CHRONIC BILATERAL LOW BACK PAIN WITH BILATERAL SCIATICA: ICD-10-CM

## 2023-02-23 RX ORDER — MELATONIN
1000 DAILY
Qty: 30 TABLET | Refills: 0 | Status: SHIPPED | OUTPATIENT
Start: 2023-02-23

## 2023-02-23 RX ORDER — FERROUS SULFATE TAB EC 324 MG (65 MG FE EQUIVALENT) 324 (65 FE) MG
324 TABLET DELAYED RESPONSE ORAL
Qty: 30 TABLET | Refills: 0 | Status: SHIPPED | OUTPATIENT
Start: 2023-02-23

## 2023-02-23 RX ORDER — CHLORHEXIDINE GLUCONATE 0.12 MG/ML
15 RINSE ORAL ONCE
OUTPATIENT
Start: 2023-02-23 | End: 2023-02-23

## 2023-02-23 RX ORDER — ZINC SULFATE 50(220)MG
220 CAPSULE ORAL DAILY
Qty: 30 CAPSULE | Refills: 0 | Status: SHIPPED | OUTPATIENT
Start: 2023-02-23

## 2023-02-23 RX ORDER — FOLIC ACID 1 MG/1
1 TABLET ORAL DAILY
Qty: 30 TABLET | Refills: 0 | Status: SHIPPED | OUTPATIENT
Start: 2023-02-23 | End: 2023-03-25

## 2023-02-23 RX ORDER — ACETAMINOPHEN 325 MG/1
975 TABLET ORAL ONCE
OUTPATIENT
Start: 2023-02-23 | End: 2023-02-23

## 2023-02-23 RX ORDER — GABAPENTIN 300 MG/1
300 CAPSULE ORAL ONCE
OUTPATIENT
Start: 2023-02-23 | End: 2023-02-23

## 2023-02-23 NOTE — PROGRESS NOTES
Assessment/Plan:  1  Primary osteoarthritis of both knees  XR knee 3 vw left non injury    XR knee 3 vw right non injury    Case request operating room: ARTHROPLASTY KNEE TOTAL W ROBOT - cemented, overnight    Comprehensive metabolic panel    Hemoglobin A1C W/EAG Estimation    CBC and differential    If Symptomatic, order: UA w Reflex to Microscopic w Reflex to Culture    Protime-INR    APTT    MRSA culture    Ambulatory referral to Cardiology    Ambulatory referral to [facility]    Ambulatory referral to Physical Therapy    EKG 12 lead    XR chest pa & lateral    Case request operating room: ARTHROPLASTY KNEE TOTAL W ROBOT - cemented, overnight    zinc sulfate (ZINCATE) 220 mg capsule    folic acid (FOLVITE) 1 mg tablet    ferrous sulfate 324 (65 Fe) mg    cholecalciferol (VITAMIN D3) 1,000 units tablet    ascorbic Acid (VITAMIN C) 500 MG CPCR      2  Chronic pain of both knees  Case request operating room: ARTHROPLASTY KNEE TOTAL W ROBOT - cemented, overnight    Comprehensive metabolic panel    Hemoglobin A1C W/EAG Estimation    CBC and differential    If Symptomatic, order: UA w Reflex to Microscopic w Reflex to Culture    Protime-INR    APTT    MRSA culture    Ambulatory referral to Cardiology    Ambulatory referral to [facility]    Ambulatory referral to Physical Therapy    EKG 12 lead    XR chest pa & lateral    Case request operating room: ARTHROPLASTY KNEE TOTAL W ROBOT - cemented, overnight    zinc sulfate (ZINCATE) 220 mg capsule    folic acid (FOLVITE) 1 mg tablet    ferrous sulfate 324 (65 Fe) mg    cholecalciferol (VITAMIN D3) 1,000 units tablet    ascorbic Acid (VITAMIN C) 500 MG CPCR      3  Diabetic polyneuropathy associated with type 2 diabetes mellitus [facility])  Ambulatory referral to Cardiology    Ambulatory referral to [facility]      4   Gastroesophageal reflux disease, unspecified whether esophagitis present  Ambulatory referral to Cardiology    Ambulatory referral to [facility] 5  Benign essential hypertension  Ambulatory referral to Cardiology    Ambulatory referral to [facility]      6  Chronic bilateral low back pain with bilateral sciatica  Ambulatory referral to Cardiology    Ambulatory referral to [facility]      7  Cervical radiculopathy  Ambulatory referral to Cardiology    Ambulatory referral to [facility]      8  Palpitation  Ambulatory referral to Cardiology    Ambulatory referral to [facility]      9  Increased frequency of urination  Ambulatory referral to Cardiology    Ambulatory referral to [facility]      10  Obesity, morbid ([address] )        11  Pre-operative examination  Comprehensive metabolic panel    Hemoglobin A1C W/EAG Estimation    CBC and differential    If Symptomatic, order: UA w Reflex to Microscopic w Reflex to Culture    Protime-INR    APTT    MRSA culture    Ambulatory referral to Cardiology    Ambulatory referral to [facility]    Ambulatory referral to Physical Therapy    EKG 12 lead    XR chest pa & lateral        Scribe Attestation    I,:  Jacob Pepper PA-C am acting as a scribe while in the presence of the attending physician :       I,:  Leonel Ramirez, DO personally performed the services described in this documentation    as scribed in my presence :         Samantha Woody is a pleasant 27-year-old female presenting today for evaluation of her activity related bilateral knee pain, right greater than left  After reviewing her updated imaging, history, physical exam, believe that she is symptomatic of her severe end-stage underlying right knee osteoarthritis and severe left knee osteoarthritis  Unfortunately, she is already attempted and failed all forms of conservative treatment  We discussed the pre-, swati-, and postoperative expectations for a robotic assisted right total knee arthroplasty    Risks of surgery including but not limited to bleeding, infection, stiffness, need for further surgery, persistent limp, persistent pain, damage to vessels or nerves, blood clots, heart attack, stroke, and death were discussed  Consents were signed and placed into the chart for a robotic assisted right total knee arthroplasty  We will plan to use a cemented prosthesis and keep her overnight for monitoring  She does have support at home with her daughter, but states that neither her or her daughter drive and she relies on logistic care  We discussed that we have had issues with this in the past and may not be the most reliable transportation, but she would still like to utilize it for outpatient physical therapy  She will need clearance from her primary care physician and a cardiologist prior to the intended procedure  She denies any history of MRSA infection, malignancy, DVT or PE, gastric ulcer or GI bleed, or smoking or nicotine use  She does use tumeric but understands she needs to stop 30 days prior to the intended procedure  She was introduced today to our surgical scheduler for further planning and we look forward to taking care of her in the near future  Subjective: Bilateral knee pain    Patient ID: Sherie Suarez is a 81 y o  female presenting today on referral from our colleague, Dr Aziza Ferrari, for subspecialty of joint replacement and reconstruction  She has had ongoing bilateral knee pain for many years  She has been treated with Tylenol, NSAIDs, weight modification, ambulatory assistive devices, braces, cortisone injections, and most recently viscosupplementation injections that completed in November  Unfortunately, they will fail to provide significant long-lasting relief of her symptoms  She continues to be limited in her ability to perform activities of daily living and enjoyment  Her right knee bothers her more than her left and can be up to 10 out of 10 on a daily basis  She denies any distal paresthesias, locking, buckling, or giving way  Review of Systems   Constitutional: Positive for activity change  HENT: Negative  Eyes: Negative  Respiratory: Negative  Cardiovascular: Negative  Gastrointestinal: Negative  Endocrine: Negative  Genitourinary: Negative  Musculoskeletal: Positive for arthralgias, gait problem, joint swelling and myalgias  Skin: Negative  Allergic/Immunologic: Negative  Hematological: Negative  Psychiatric/Behavioral: Negative  Past Medical History:   Diagnosis Date   • Arthritis     DJD- left thumb DJD,most joints   • Borderline diabetes    • Change in bowel habits    • Chronic pain disorder     lumbar   • Colon polyp    • Constipation     unable to expell the stool completely with BM   • COVID-19 2020    in the winter   • Deviated septum    • Diabetes mellitus ([facility] [address] )    • Diverticulosis     diverticulitis- with resection-colostomy then reversed   • DJD (degenerative joint disease)     pt does get injections to the knee with Dr Corrinne Pates   • GERD (gastroesophageal reflux disease)    • Glaucoma    • Hearing aid worn     bilateral ears for background noise   • Hyperlipidemia    • Irregular heart beat     "skips a beat"   • Low back pain    • Lumbar degenerative disc disease 09/16/2019   • Muscle spasm     legs   • Peripheral neuropathy    • PND (post-nasal drip)    • Presence of upper and lower permanent dental bridges    • Sciatica    • Spinal stenosis    • Wears glasses        Past Surgical History:   Procedure Laterality Date   • BREAST SURGERY  2005    bilateral reduction   • COLON SURGERY  2014    resection with colostomy-ruptured "intestine"   • COLONOSCOPY N/A 07/11/2016    Procedure: COLONOSCOPY;  Surgeon: Audrey Luther MD;  Location: [place] [address] GI LAB; Service:    • COLOSTOMY CLOSURE  2015   • COMBINED REDUCTION MAMMAPLASTY W/ LIPOSUCTION     • EPIDURAL BLOCK INJECTION N/A 11/15/2019    Procedure: L4 L5 Lumbar Epidural Steroid Injection (72235);   Surgeon: Roro Gaspar MD;  Location: [facility] MAIN OR;  Service: Pain Management    • EPIDURAL BLOCK INJECTION N/A 2020    Procedure: L4 L5 Lumbar Epidural Steroid Injection (09672); Surgeon: Vivien Barnhart MD;  Location: [facility] MAIN OR;  Service: Pain Management    • EPIDURAL BLOCK INJECTION N/A 2020    Procedure: L4-L5 LUMBAR EPIDURAL STEROID INJECTION (#2) (10846); Surgeon: Vivien Barnhart MD;  Location: [facility] MAIN OR;  Service: Pain Management    • HERNIA MESH REMOVAL  2022    hernia repair, mesh removal reconstruction at [facility] Dr Rain Seymuor   • HERNIA REPAIR  2016    x3   • AK XCAPSL CTRC RMVL INSJ IO LENS PROSTH W/O ECP Right 2022    Procedure: EXTRACTION EXTRACAPSULAR CATARACT PHACO INTRAOCULAR LENS (IOL); Surgeon: Courtney Urias MD;  Location: [facility] MAIN OR;  Service: Ophthalmology   • AK XCAPSL CTRC RMVL INSJ IO LENS PROSTH W/O ECP Left 10/24/2022    Procedure: EXTRACTION EXTRACAPSULAR CATARACT PHACO INTRAOCULAR LENS (IOL);   Surgeon: Courtney Urias MD;  Location: [facility] MAIN OR;  Service: Ophthalmology   • TUBAL LIGATION         Family History   Problem Relation Age of Onset   • Heart disease Mother    • Dementia Father    • No Known Problems Sister    • No Known Problems Brother    • No Known Problems Maternal Aunt    • No Known Problems Maternal Uncle    • No Known Problems Paternal Aunt    • No Known Problems Paternal Uncle    • No Known Problems Maternal Grandmother    • No Known Problems Maternal Grandfather    • No Known Problems Paternal Grandmother    • No Known Problems Paternal Grandfather        Social History     Occupational History   • Not on file   Tobacco Use   • Smoking status: Former     Types: Cigarettes     Quit date: 1976     Years since quittin 6   • Smokeless tobacco: Never   Vaping Use   • Vaping Use: Never used   Substance and Sexual Activity   • Alcohol use: No   • Drug use: Not Currently     Types: Marijuana     Comment: stopped in    • Sexual activity: Not on file         Current Outpatient Medications:   •  ascorbic Acid (VITAMIN C) 500 MG CPCR, Take 1 capsule (500 mg total) by mouth 2 (two) times a day, Disp: 60 capsule, Rfl: 0  •  cholecalciferol (VITAMIN D3) 1,000 units tablet, Take 1 tablet (1,000 Units total) by mouth daily, Disp: 30 tablet, Rfl: 0  •  ferrous sulfate 324 (65 Fe) mg, Take 1 tablet (324 mg total) by mouth daily before breakfast, Disp: 30 tablet, Rfl: 0  •  folic acid (FOLVITE) 1 mg tablet, Take 1 tablet (1 mg total) by mouth daily, Disp: 30 tablet, Rfl: 0  •  zinc sulfate (ZINCATE) 220 mg capsule, Take 1 capsule (220 mg total) by mouth daily, Disp: 30 capsule, Rfl: 0  •  ammonium lactate (LAC-HYDRIN) 12 % cream, Apply topically as needed for dry skin, Disp: 385 g, Rfl: 0  •  aspirin (ECOTRIN LOW STRENGTH) 81 mg EC tablet, 81 mg daily at bedtime, Disp: , Rfl:   •  atorvastatin (LIPITOR) 40 mg tablet, Take 1 tablet (40 mg total) by mouth daily (Patient taking differently: Take 40 mg by mouth daily at bedtime), Disp: 90 tablet, Rfl: 3  •  azelastine (ASTELIN) 0 1 % nasal spray, 1 spray into each nostril 2 (two) times a day as needed for rhinitis Use in each nostril as directed, Disp: , Rfl:   •  b complex vitamins capsule, Take 1 capsule by mouth daily, Disp: , Rfl:   •  GEE SEED PO, Take by mouth in the morning, Disp: , Rfl:   •  ciprofloxacin (CILOXAN) 0 3 % ophthalmic solution, Administer 1 drop to the right eye 4 (four) times a day (Patient taking differently: Administer 1 drop into the left eye 4 (four) times a day), Disp: 5 mL, Rfl: 0  •  Coenzyme Q10 (Co Q 10) 100 MG CAPS, Take by mouth daily with lunch, Disp: , Rfl:   •  COMFORT EZ PEN NEEDLES 31G X 6 MM MISC, daily , Disp: , Rfl:   •  Flaxseed, Linseed, (FLAX SEEDS PO), Take by mouth in the morning, Disp: , Rfl:   •  ketorolac (ACULAR) 0 5 % ophthalmic solution, Administer 1 drop to the right eye 4 (four) times a day (Patient taking differently: Administer 1 drop into the left eye 4 (four) times a day), Disp: 5 mL, Rfl: 0  •  latanoprost (XALATAN) 0 005 % ophthalmic solution, Administer to both eyes daily at bedtime, Disp: , Rfl:   •  liraglutide (VICTOZA) injection, Inject 0 6 mg under the skin daily after breakfast, Disp: , Rfl:   •  metoprolol succinate (TOPROL-XL) 50 mg 24 hr tablet, Take 1 tablet (50 mg total) by mouth daily (Patient taking differently: Take 50 mg by mouth daily at bedtime), Disp: 90 tablet, Rfl: 3  •  montelukast (SINGULAIR) 10 mg tablet, 10 mg every morning , Disp: , Rfl:   •  multivitamin (THERAGRAN) TABS, Take 1 tablet by mouth daily, Disp: , Rfl:   •  omeprazole (PriLOSEC) 40 MG capsule, Take 1 capsule (40 mg total) by mouth every morning, Disp: 90 capsule, Rfl: 0  •  ONE TOUCH ULTRA TEST test strip, daily as needed , Disp: , Rfl: 1  •  TURMERIC PO, Take by mouth in the morning, Disp: , Rfl:   •  VITAMIN D PO, Take by mouth in the morning, Disp: , Rfl:     Allergies   Allergen Reactions   • Metformin Vomiting     N/V, dizziness       Objective:  Vitals:    02/23/23 1459   BP: 130/80   Pulse: 74       Body mass index is 35 7 kg/m²  Right Knee Exam     Muscle Strength   The patient has normal right knee strength  Tenderness   The patient is experiencing tenderness in the MCL, medial joint line and medial retinaculum  Range of Motion   Extension:  5 abnormal   Flexion:  130 normal     Tests   Varus: negative Valgus: negative  Drawer:  Anterior - negative      Patellar apprehension: negative    Other   Erythema: absent  Scars: absent  Sensation: normal  Pulse: present  Swelling: none  Effusion: effusion present    Comments:  Stable 5, 30, 90  Positive patellofemoral crepitance and grind  Thigh and calf soft and nontender  Ambulates with an antalgic gait on the right  Grossly distally neurovascular intact        Left Knee Exam     Muscle Strength   The patient has normal left knee strength  Tenderness   The patient is experiencing tenderness in the medial joint line and MCL      Range of Motion   Extension:  5 abnormal   Flexion:  130 normal     Tests   Varus: negative Valgus: negative  Lachman:  Anterior - negative      Drawer:  Anterior - negative       Patellar apprehension: negative    Other   Erythema: absent  Scars: absent  Sensation: normal  Pulse: present  Swelling: none  Effusion: no effusion present          Observations   Left Knee   Negative for effusion  Right Knee   Positive for effusion  Physical Exam  Vitals and nursing note reviewed  Constitutional:       Appearance: Normal appearance  She is well-developed  Comments: Body mass index is 35 7 kg/m²  HENT:      Head: Normocephalic and atraumatic  Right Ear: External ear normal       Left Ear: External ear normal    Eyes:      Extraocular Movements: Extraocular movements intact  Conjunctiva/sclera: Conjunctivae normal    Cardiovascular:      Rate and Rhythm: Normal rate  Pulses: Normal pulses  Pulmonary:      Effort: Pulmonary effort is normal    Musculoskeletal:      Cervical back: Normal range of motion  Right knee: Effusion present  Left knee: No effusion  Comments: See ortho exam   Skin:     General: Skin is warm and dry  Neurological:      General: No focal deficit present  Mental Status: She is alert and oriented to person, place, and time  Mental status is at baseline  Psychiatric:         Mood and Affect: Mood normal          Behavior: Behavior normal          Thought Content: Thought content normal          Judgment: Judgment normal        I have personally reviewed pertinent films in PACS of the updated weightbearing mag marker x-rays taken today of both knees  She has end-stage degenerative changes of the right knee and severe of the left  She has varus deformities with bone-on-bone appearance of the medial compartment of the right knee  It also has significant osteophytosis and sclerosis  This document was created using speech voice recognition software     Grammatical errors, random word insertions, pronoun errors, and incomplete sentences are an occasional consequence of this system due to software limitations, ambient noise, and hardware issues  Any formal questions or concerns about content, text, or information contained within the body of this dictation should be directly addressed to the provider for clarification

## 2023-03-06 ENCOUNTER — TELEPHONE (OUTPATIENT)
Dept: OBGYN CLINIC | Facility: CLINIC | Age: 83
End: 2023-03-06

## 2023-03-07 NOTE — TELEPHONE ENCOUNTER
Called patient informed about the tattoo, she verbalized understanding  Patient proceeded to state that "she is not ready for surgery and would prefer to push off to September, she has other important things to attend to first"  She will call for an appointment in August   All surgical appointments will be cancelled

## 2023-03-23 ENCOUNTER — OFFICE VISIT (OUTPATIENT)
Dept: CARDIOLOGY CLINIC | Facility: CLINIC | Age: 83
End: 2023-03-23

## 2023-03-23 VITALS
WEIGHT: 208 LBS | HEIGHT: 64 IN | BODY MASS INDEX: 35.51 KG/M2 | HEART RATE: 71 BPM | SYSTOLIC BLOOD PRESSURE: 110 MMHG | OXYGEN SATURATION: 96 % | DIASTOLIC BLOOD PRESSURE: 74 MMHG

## 2023-03-23 DIAGNOSIS — E11.42 DIABETIC POLYNEUROPATHY ASSOCIATED WITH TYPE 2 DIABETES MELLITUS (HCC): ICD-10-CM

## 2023-03-23 DIAGNOSIS — E78.5 DYSLIPIDEMIA: ICD-10-CM

## 2023-03-23 DIAGNOSIS — I10 PRIMARY HYPERTENSION: Primary | ICD-10-CM

## 2023-03-23 DIAGNOSIS — Z01.818 PRE-OPERATIVE EXAMINATION: ICD-10-CM

## 2023-03-23 DIAGNOSIS — I10 BENIGN ESSENTIAL HYPERTENSION: ICD-10-CM

## 2023-03-23 DIAGNOSIS — R00.2 PALPITATION: ICD-10-CM

## 2023-03-23 NOTE — PROGRESS NOTES
Cardiology   Celso Mosher DO, Grecia Kumar MD, Kristie Tsai MD, Namja Hernández MD, [facility] - [place]  -------------------------------------------------------------------  [facility] ORTHOPEDIC [facility]  [address], [facility] [place] [address], [facility] [facility]  [phone]    Cardiology Follow Up  Radha Mancuso  1940  [id number]          Assessment/Plan:    1  Primary hypertension    2  Diabetic polyneuropathy associated with type 2 diabetes mellitus ([facility] [address] )    3  Benign essential hypertension    4  Palpitation    5  Pre-operative examination    6  Dyslipidemia      - Patient with intermittent palpitations - There was a 3% PVC burden during her last   - Will continue metoprolol 50 mg daily  - continue atorvastatin   - Had blood work with her PCP   - She has postponed surgery  She should be seen prior to procedure if over 3 months from now  Interval History:     Radha Mancuso is 81 y o  female here for followup of palpitations and to discuss risk of knee arthroplasty  Was scheduled for knee arthroplasty but she has canceled procedure because she does not wish to have recovery over summer  She will likely have it done in the fall  She denies any chest pain or shortness of breath  She has occasional palpitations  She had a stress test and echocardiogram in 2022 which were normal   Previous 2 week event monitor showed a 3 9% PVC burden  She had symptoms during monitoring which corresponded to PVCs  She had occasional SVT episodes as well  Current medication regimen includes Toprol XL 50 mg daily  Previously, she was using diltiazem  She has intermittent palpitations  Denies any syncope or near syncope  Holter monitor was done on March 10, 2022 which showed frequent PVCs which totaled 16 8% of all monitored beats without any episodes of ventricular tachycardia    There were 4 episodes of supraventricular tachycardia with the longest lasting for 5 beats  Patient also had a stress test done which showed an EF of 71% with no ischemia or infarction noted  A 2D echocardiogram showed ejection fraction of 55% with no valve disease    TSH was normal         Past Medical History:   Diagnosis Date   • Arthritis     DJD- left thumb DJD,most joints   • Borderline diabetes    • Change in bowel habits    • Chronic pain disorder     lumbar   • Colon polyp    • Constipation     unable to expell the stool completely with BM   • COVID-19     in the winter   • Deviated septum    • Diabetes mellitus ([facility] [address] )    • Diverticulosis     diverticulitis- with resection-colostomy then reversed   • DJD (degenerative joint disease)     pt does get injections to the knee with Dr Lazarus Riis   • GERD (gastroesophageal reflux disease)    • Glaucoma    • Hearing aid worn     bilateral ears for background noise   • Hyperlipidemia    • Irregular heart beat     "skips a beat"   • Low back pain    • Lumbar degenerative disc disease 2019   • Muscle spasm     legs   • Peripheral neuropathy    • PND (post-nasal drip)    • Presence of upper and lower permanent dental bridges    • Sciatica    • Spinal stenosis    • Wears glasses      Social History     Socioeconomic History   • Marital status:      Spouse name: Not on file   • Number of children: Not on file   • Years of education: Not on file   • Highest education level: Not on file   Occupational History   • Not on file   Tobacco Use   • Smoking status: Former     Types: Cigarettes     Quit date: 1976     Years since quittin 7   • Smokeless tobacco: Never   Vaping Use   • Vaping Use: Never used   Substance and Sexual Activity   • Alcohol use: No   • Drug use: Not Currently     Types: Marijuana     Comment: stopped in    • Sexual activity: Not on file   Other Topics Concern   • Not on file   Social History Narrative   • Not on file     Social Determinants of Health     Financial Resource Strain: Not on file Food Insecurity: Not on file   Transportation Needs: Not on file   Physical Activity: Not on file   Stress: Not on file   Social Connections: Not on file   Intimate Partner Violence: Not on file   Housing Stability: Not on file      Family History   Problem Relation Age of Onset   • Heart disease Mother    • Dementia Father    • No Known Problems Sister    • No Known Problems Brother    • No Known Problems Maternal Aunt    • No Known Problems Maternal Uncle    • No Known Problems Paternal Aunt    • No Known Problems Paternal Uncle    • No Known Problems Maternal Grandmother    • No Known Problems Maternal Grandfather    • No Known Problems Paternal Grandmother    • No Known Problems Paternal Grandfather      Past Surgical History:   Procedure Laterality Date   • BREAST SURGERY  2005    bilateral reduction   • COLON SURGERY  2014    resection with colostomy-ruptured "intestine"   • COLONOSCOPY N/A 07/11/2016    Procedure: COLONOSCOPY;  Surgeon: Odalys Lomax MD;  Location: [facility] GI LAB; Service:    • COLOSTOMY CLOSURE  2015   • COMBINED REDUCTION MAMMAPLASTY W/ LIPOSUCTION     • EPIDURAL BLOCK INJECTION N/A 11/15/2019    Procedure: L4 L5 Lumbar Epidural Steroid Injection (17913); Surgeon: Matt Padilla MD;  Location: [facility] MAIN OR;  Service: Pain Management    • EPIDURAL BLOCK INJECTION N/A 03/12/2020    Procedure: L4 L5 Lumbar Epidural Steroid Injection (63347); Surgeon: Matt Padilla MD;  Location: [facility] MAIN OR;  Service: Pain Management    • EPIDURAL BLOCK INJECTION N/A 07/23/2020    Procedure: L4-L5 LUMBAR EPIDURAL STEROID INJECTION (#2) (67470);   Surgeon: Matt Padilla MD;  Location: [facility] MAIN OR;  Service: Pain Management    • HERNIA MESH REMOVAL  06/22/2022    hernia repair, mesh removal reconstruction at [facility]ick[facility]   • HERNIA REPAIR  2016    x3   • CO XCAPSL CTRC RMVL INSJ IO LENS PROSTH W/O ECP Right 08/22/2022    Procedure: EXTRACTION EXTRACAPSULAR CATARACT PHACO INTRAOCULAR LENS (IOL); Surgeon: Marilynn Ames MD;  Location: [facility] MAIN OR;  Service: Ophthalmology   • CA XCAPSL CTRC RMVL INSJ IO LENS PROSTH W/O ECP Left 10/24/2022    Procedure: EXTRACTION EXTRACAPSULAR CATARACT PHACO INTRAOCULAR LENS (IOL);   Surgeon: Marilynn Ames MD;  Location: [facility] MAIN OR;  Service: Ophthalmology   • TUBAL LIGATION         Current Outpatient Medications:   •  ammonium lactate (LAC-HYDRIN) 12 % cream, Apply topically as needed for dry skin, Disp: 385 g, Rfl: 0  •  ascorbic Acid (VITAMIN C) 500 MG CPCR, Take 1 capsule (500 mg total) by mouth 2 (two) times a day, Disp: 60 capsule, Rfl: 0  •  aspirin (ECOTRIN LOW STRENGTH) 81 mg EC tablet, 81 mg daily at bedtime, Disp: , Rfl:   •  atorvastatin (LIPITOR) 40 mg tablet, Take 1 tablet (40 mg total) by mouth daily (Patient taking differently: Take 40 mg by mouth daily at bedtime), Disp: 90 tablet, Rfl: 3  •  azelastine (ASTELIN) 0 1 % nasal spray, 1 spray into each nostril 2 (two) times a day as needed for rhinitis Use in each nostril as directed, Disp: , Rfl:   •  b complex vitamins capsule, Take 1 capsule by mouth daily, Disp: , Rfl:   •  GEE SEED PO, Take by mouth in the morning, Disp: , Rfl:   •  cholecalciferol (VITAMIN D3) 1,000 units tablet, Take 1 tablet (1,000 Units total) by mouth daily, Disp: 30 tablet, Rfl: 0  •  ciprofloxacin (CILOXAN) 0 3 % ophthalmic solution, Administer 1 drop to the right eye 4 (four) times a day (Patient taking differently: Administer 1 drop into the left eye 4 (four) times a day), Disp: 5 mL, Rfl: 0  •  Coenzyme Q10 (Co Q 10) 100 MG CAPS, Take by mouth daily with lunch, Disp: , Rfl:   •  COMFORT EZ PEN NEEDLES 31G X 6 MM MISC, daily , Disp: , Rfl:   •  ferrous sulfate 324 (65 Fe) mg, Take 1 tablet (324 mg total) by mouth daily before breakfast, Disp: 30 tablet, Rfl: 0  •  Flaxseed, Linseed, (FLAX SEEDS PO), Take by mouth in the morning, Disp: , Rfl:   •  folic acid (FOLVITE) 1 mg tablet, Take 1 tablet (1 mg total) by mouth daily, Disp: 30 tablet, Rfl: 0  •  ketorolac (ACULAR) 0 5 % ophthalmic solution, Administer 1 drop to the right eye 4 (four) times a day (Patient taking differently: Administer 1 drop into the left eye 4 (four) times a day), Disp: 5 mL, Rfl: 0  •  latanoprost (XALATAN) 0 005 % ophthalmic solution, Administer to both eyes daily at bedtime, Disp: , Rfl:   •  liraglutide (VICTOZA) injection, Inject 0 6 mg under the skin daily after breakfast, Disp: , Rfl:   •  metoprolol succinate (TOPROL-XL) 50 mg 24 hr tablet, Take 1 tablet (50 mg total) by mouth daily (Patient taking differently: Take 50 mg by mouth daily at bedtime), Disp: 90 tablet, Rfl: 3  •  montelukast (SINGULAIR) 10 mg tablet, 10 mg every morning , Disp: , Rfl:   •  multivitamin (THERAGRAN) TABS, Take 1 tablet by mouth daily, Disp: , Rfl:   •  omeprazole (PriLOSEC) 40 MG capsule, Take 1 capsule (40 mg total) by mouth every morning, Disp: 90 capsule, Rfl: 0  •  ONE TOUCH ULTRA TEST test strip, daily as needed , Disp: , Rfl: 1  •  TURMERIC PO, Take by mouth in the morning, Disp: , Rfl:   •  VITAMIN D PO, Take by mouth in the morning, Disp: , Rfl:   •  zinc sulfate (ZINCATE) 220 mg capsule, Take 1 capsule (220 mg total) by mouth daily, Disp: 30 capsule, Rfl: 0        Review of Systems:  Review of Systems   Constitutional: Negative for fatigue  Respiratory: Negative for shortness of breath  Cardiovascular: Positive for palpitations  Negative for chest pain and leg swelling  Musculoskeletal: Positive for arthralgias  All other systems reviewed and are negative  Physical Exam:  Vitals:  Vitals:    03/23/23 1310   BP: 110/74   BP Location: Left arm   Patient Position: Sitting   Cuff Size: Standard   Pulse: 71   SpO2: 96%   Weight: 94 3 kg (208 lb)   Height: 5' 4" (1 626 m)     Physical Exam   Constitutional: She appears healthy  No distress  Eyes: Pupils are equal, round, and reactive to light  Conjunctivae are normal    Neck: No JVD present  Cardiovascular: Normal rate, regular rhythm and normal heart sounds  Exam reveals no gallop and no friction rub  No murmur heard  Pulmonary/Chest: Effort normal and breath sounds normal  She has no wheezes  She has no rales  Musculoskeletal:         General: No tenderness, deformity or edema  Cervical back: Normal range of motion and neck supple  Neurological: She is alert and oriented to person, place, and time  Skin: Skin is warm and dry  Cardiographics:  EKG: Personally reviewed NSR   Last known EF: 55%    This note was completed in part utilizing M-Modal Fluency Direct Software  Grammatical errors, random word insertions, spelling mistakes, and incomplete sentences can be an occasional consequence of this system secondary to software limitations, ambient noise, and hardware issues  If you have any questions or concerns about the content, text, or information contained within the body of this dictation, please contact the provider for clarification

## 2023-05-02 ENCOUNTER — OFFICE VISIT (OUTPATIENT)
Dept: ENDOCRINOLOGY | Facility: CLINIC | Age: 83
End: 2023-05-02

## 2023-05-02 VITALS
SYSTOLIC BLOOD PRESSURE: 130 MMHG | DIASTOLIC BLOOD PRESSURE: 80 MMHG | HEART RATE: 73 BPM | HEIGHT: 64 IN | BODY MASS INDEX: 35.17 KG/M2 | WEIGHT: 206 LBS

## 2023-05-02 DIAGNOSIS — E11.69 TYPE 2 DIABETES MELLITUS WITH OTHER SPECIFIED COMPLICATION, WITHOUT LONG-TERM CURRENT USE OF INSULIN (HCC): Primary | ICD-10-CM

## 2023-05-02 DIAGNOSIS — E11.42 TYPE 2 DIABETES MELLITUS WITH DIABETIC POLYNEUROPATHY, WITHOUT LONG-TERM CURRENT USE OF INSULIN (HCC): Primary | ICD-10-CM

## 2023-05-02 DIAGNOSIS — R63.5 WEIGHT GAIN: ICD-10-CM

## 2023-05-02 DIAGNOSIS — Z13.220 SCREENING FOR LIPID DISORDERS: ICD-10-CM

## 2023-05-02 DIAGNOSIS — R20.2 NUMBNESS AND TINGLING: ICD-10-CM

## 2023-05-02 DIAGNOSIS — E11.42 DIABETIC POLYNEUROPATHY ASSOCIATED WITH TYPE 2 DIABETES MELLITUS (HCC): ICD-10-CM

## 2023-05-02 DIAGNOSIS — R20.0 NUMBNESS AND TINGLING: ICD-10-CM

## 2023-05-02 DIAGNOSIS — I10 BENIGN ESSENTIAL HYPERTENSION: ICD-10-CM

## 2023-05-02 LAB — SL AMB POCT HEMOGLOBIN AIC: 5.5 (ref ?–6.5)

## 2023-05-02 PROCEDURE — 83036 HEMOGLOBIN GLYCOSYLATED A1C: CPT

## 2023-05-02 NOTE — PATIENT INSTRUCTIONS
Continue Victoza 0 6 mg subcutaneously daily for now  Please check blood sugars least twice a day before meals and at bedtime, keep a log  Trial off of Victoza, please check blood sugars, document  Labs as ordered  You have been referred for diabetes education  Also referral placed for vascular surgery, podiatry  Follow-up in 6 weeks

## 2023-05-02 NOTE — PROGRESS NOTES
Linda Trujillo 81 y o  female MRN: [id number]    Encounter: [id number]  Referring Provider  No referring provider defined for this encounter  Assessment/Plan     1  Type 2 diabetes mellitus not on long term insulin therapy   2  Obesity, BMI 35   3  Neuropathy   Well Controlled with point-of-care A1c 5 5%  Tightly controlled for age, on Victoza 0 6 mg subcutaneously daily only  Discussed with patient that she would likely be well controlled with lifestyle modifications only  Patient was hesitant but is willing to check blood sugars with Victoza and then do a trial off the medication     Recommend the following at this time  Continue Victoza 0 6 mg subcutaneously daily  Stagger check blood sugars twice a day before meals and at bedtime, keep a log  Trial off of Victoza for a few weeks, if patient notices her blood sugars are trending up, she will let me know, send log for review  Follow up in 6 weeks   Labs as ordered including vitamin B12    -Patient has been referred to podiatry, vascular surgery, diabetes education    - Recommended a consistent carbohydrate diet   - weight control and exercise as discussed ( ideal is 30 min, at least 5 times a week)   - home glucose monitoring and goals emphasized, requested patient bring in glucose monitor or log sheets to next visit   - discussed signs and symptoms of hypoglycemia and how to correct them appropriately  - counseled about the long term complications of uncontrolled diabetes, including, Nephropathy, Neuropathy, CVD, Retinopathy and importance  of adherence to diet, treatment plan and life style modifications   - importance of following up with Opthalmology and podiatry   - glycohemoglobin and other lab monitoring discussed, A1c goal value reviewed  - long term diabetic complications discussed  - labs immediately prior to next visit     4   Hyperlipidemia  - continue statin therapy        CC: Diabetes    History of Present Illness     HPI:  Linda Trujillo "is a 81 y o  female presents for a new visit regarding diabetes management  Also has a h/o hypertension, neuropathy, GERD, obesity    Says that she was having a study fir her nerves around 2018/ 2019 in [place], had high BP, needing to be taken to the hospital   Was also told that she has diabetes mellitus   Did not tolerate metformin (did not try XR), was started on Victoza 0 6 mg daily which she is still on  Feels her BG have been trending up recently  Has made major dietary changes, less crabs  Lost 20 lbs initially when she started victoza, had hernia surgery in 2022, gained weight back at that time  FH : brother, sister   No known complications of CAD/ CVA/CKD  Last Eye exam: Dr Stephanie Daniels, No DR per history   Has h/o glaucoma, has had cataract surgery     Current regimen:   Vixctoza 0 6 mg subcutaneously daily    Statin: Lipitor 40  ACE-I/ARB:  --    Appetite is good  has numbness or tingling in the hands  Has knee pain and feet   Says she has \"poor circulation\", feet become cold and occasional cramps   No chest pain  Occasional SOB on exertion  No diarrhea/ constipation  Not much activity/ exercise due to the knees   Occasional feels very  Hot at night , increased sweating     Home glucose monitoring:on recall   Before breakfast:  110-117 mg/dl, 100 mg/dl this morning   Will check with she has tiredness or if she overate   Symptoms of hypoglycemia :  No low BG     All other systems were reviewed and are negative      Review of Systems      Historical Information   Past Medical History:   Diagnosis Date    Arthritis     DJD- left thumb DJD,most joints    Borderline diabetes     Change in bowel habits     Chronic pain disorder     lumbar    Colon polyp     Constipation     unable to expell the stool completely with BM    COVID-19 2020    in the winter    Deviated septum     Diabetes mellitus ([facility] [address] )     Diverticulosis     diverticulitis- with resection-colostomy then reversed    DJD " "(degenerative joint disease)     pt does get injections to the knee with Dr Sameer Arana GERD (gastroesophageal reflux disease)     Glaucoma     Hearing aid worn     bilateral ears for background noise    Hyperlipidemia     Irregular heart beat     \"skips a beat\"    Low back pain     Lumbar degenerative disc disease 09/16/2019    Muscle spasm     legs    Peripheral neuropathy     PND (post-nasal drip)     Presence of upper and lower permanent dental bridges     Sciatica     Spinal stenosis     Wears glasses      Past Surgical History:   Procedure Laterality Date    BREAST SURGERY  2005    bilateral reduction    COLON SURGERY  2014    resection with colostomy-ruptured \"intestine\"    COLONOSCOPY N/A 07/11/2016    Procedure: COLONOSCOPY;  Surgeon: Nehemiah Stein MD;  Location: [facility] GI LAB; Service:     COLOSTOMY CLOSURE  2015    COMBINED REDUCTION MAMMAPLASTY W/ LIPOSUCTION      EPIDURAL BLOCK INJECTION N/A 11/15/2019    Procedure: L4 L5 Lumbar Epidural Steroid Injection (15052); Surgeon: Sravani Earl MD;  Location: [facility] OR;  Service: Pain Management     EPIDURAL BLOCK INJECTION N/A 03/12/2020    Procedure: L4 L5 Lumbar Epidural Steroid Injection (68720); Surgeon: Sravani Earl MD;  Location: [facility] MAIN OR;  Service: Pain Management     EPIDURAL BLOCK INJECTION N/A 07/23/2020    Procedure: L4-L5 LUMBAR EPIDURAL STEROID INJECTION (#2) (32839); Surgeon: Sravani Earl MD;  Location: [facility] OR;  Service: Pain Management     HERNIA MESH REMOVAL  06/22/2022    hernia repair, mesh removal [facility] at [facility] Dr Jenni Alvarez  2016    x3    [place] XCAPSL CTRC RMVL INSJ IO LENS PROSTH W/O ECP Right 08/22/2022    Procedure: EXTRACTION EXTRACAPSULAR CATARACT PHACO INTRAOCULAR LENS (IOL);   Surgeon: Webb Krabbe, MD;  Location: [facility] MAIN OR;  Service: Ophthalmology    [place] XCAPSL CTRC RMVL INSJ IO LENS PROSTH W/O ECP Left 10/24/2022    Procedure: EXTRACTION EXTRACAPSULAR " CATARACT PHACO INTRAOCULAR LENS (IOL);   Surgeon: Nikky Jackson MD;  Location: [facility] MAIN OR;  Service: Ophthalmology    TUBAL LIGATION       Social History   Social History     Substance and Sexual Activity   Alcohol Use No     Social History     Substance and Sexual Activity   Drug Use Not Currently    Types: Marijuana    Comment: stopped in      Social History     Tobacco Use   Smoking Status Former    Types: Cigarettes    Quit date: 1976    Years since quittin 8   Smokeless Tobacco Never     Family History:   Family History   Problem Relation Age of Onset    Heart disease Mother     Dementia Father     No Known Problems Sister     Diabetes type II Brother     No Known Problems Maternal Aunt     No Known Problems Maternal Uncle     No Known Problems Paternal Aunt     No Known Problems Paternal Uncle     No Known Problems Maternal Grandmother     No Known Problems Maternal Grandfather     No Known Problems Paternal Grandmother     No Known Problems Paternal Grandfather        Meds/Allergies   Current Outpatient Medications   Medication Sig Dispense Refill    ascorbic Acid (VITAMIN C) 500 MG CPCR Take 1 capsule (500 mg total) by mouth 2 (two) times a day 60 capsule 0    aspirin (ECOTRIN LOW STRENGTH) 81 mg EC tablet 81 mg daily at bedtime      atorvastatin (LIPITOR) 40 mg tablet Take 1 tablet (40 mg total) by mouth daily (Patient taking differently: Take 40 mg by mouth daily at bedtime) 90 tablet 3    azelastine (ASTELIN) 0 1 % nasal spray 1 spray into each nostril 2 (two) times a day as needed for rhinitis Use in each nostril as directed      b complex vitamins capsule Take 1 capsule by mouth daily      cholecalciferol (VITAMIN D3) 1,000 units tablet Take 1 tablet (1,000 Units total) by mouth daily 30 tablet 0    Coenzyme Q10 (Co Q 10) 100 MG CAPS Take by mouth daily with lunch      COMFORT EZ PEN NEEDLES 31G X 6 MM MISC daily       liraglutide (VICTOZA) injection Inject 0 6 mg under the skin daily after breakfast      metoprolol succinate (TOPROL-XL) 50 mg 24 hr tablet Take 1 tablet (50 mg total) by mouth daily (Patient taking differently: Take 50 mg by mouth daily at bedtime) 90 tablet 3    multivitamin (THERAGRAN) TABS Take 1 tablet by mouth daily      omeprazole (PriLOSEC) 40 MG capsule Take 1 capsule (40 mg total) by mouth every morning 90 capsule 0    ONE TOUCH ULTRA TEST test strip daily as needed   1    TURMERIC PO Take by mouth in the morning      VITAMIN D PO Take by mouth in the morning      ammonium lactate (LAC-HYDRIN) 12 % cream Apply topically as needed for dry skin 385 g 0    GEE SEED PO Take by mouth in the morning (Patient not taking: Reported on 5/2/2023)      ciprofloxacin (CILOXAN) 0 3 % ophthalmic solution Administer 1 drop to the right eye 4 (four) times a day (Patient not taking: Reported on 5/2/2023) 5 mL 0    ferrous sulfate 324 (65 Fe) mg Take 1 tablet (324 mg total) by mouth daily before breakfast (Patient not taking: Reported on 5/2/2023) 30 tablet 0    Flaxseed, Linseed, (FLAX SEEDS PO) Take by mouth in the morning (Patient not taking: Reported on 8/0/7137)      folic acid (FOLVITE) 1 mg tablet Take 1 tablet (1 mg total) by mouth daily (Patient not taking: Reported on 5/2/2023) 30 tablet 0    ketorolac (ACULAR) 0 5 % ophthalmic solution Administer 1 drop to the right eye 4 (four) times a day (Patient not taking: Reported on 5/2/2023) 5 mL 0    latanoprost (XALATAN) 0 005 % ophthalmic solution Administer to both eyes daily at bedtime (Patient not taking: Reported on 5/2/2023)      montelukast (SINGULAIR) 10 mg tablet 10 mg every morning  (Patient not taking: Reported on 5/2/2023)      zinc sulfate (ZINCATE) 220 mg capsule Take 1 capsule (220 mg total) by mouth daily (Patient not taking: Reported on 5/2/2023) 30 capsule 0     No current facility-administered medications for this visit       Allergies   Allergen Reactions    Metformin "Vomiting     N/V, dizziness       Objective   Vitals: Blood pressure 130/80, pulse 73, height 5' 4\" (1 626 m), weight 93 4 kg (206 lb)  Physical Exam  Constitutional:       Appearance: She is well-developed  HENT:      Head: Normocephalic and atraumatic  Eyes:      Conjunctiva/sclera: Conjunctivae normal       Pupils: Pupils are equal, round, and reactive to light  Neck:      Thyroid: No thyromegaly  Cardiovascular:      Rate and Rhythm: Normal rate and regular rhythm  Pulses: no weak pulses          Dorsalis pedis pulses are 2+ on the right side and 2+ on the left side  Heart sounds: Normal heart sounds  No murmur heard  Pulmonary:      Effort: Pulmonary effort is normal       Breath sounds: Normal breath sounds  No wheezing  Abdominal:      General: There is no distension  Palpations: Abdomen is soft  Tenderness: There is no abdominal tenderness  Musculoskeletal:         General: Normal range of motion  Cervical back: Normal range of motion and neck supple  Feet:      Right foot:      Skin integrity: No ulcer, skin breakdown, callus or dry skin  Left foot:      Skin integrity: No ulcer, skin breakdown, callus or dry skin  Skin:     General: Skin is warm and dry  Neurological:      Mental Status: She is alert and oriented to person, place, and time  Psychiatric:         Behavior: Behavior normal      Diabetic Foot Exam    Patient's shoes and socks removed  Right Foot/Ankle   Right Foot Inspection  Skin Exam: skin normal and abnormal color  Skin not intact, no dry skin, no callus, no maceration, no pre-ulcer, no ulcer and no callus  Sensory   Vibration: diminished  Proprioception: intact  Monofilament testing: intact    Vascular  Capillary refills: < 3 seconds  The right DP pulse is 2+  Left Foot/Ankle  Left Foot Inspection  Skin Exam: skin normal and abnormal color  Skin not intact, no dry skin, no maceration, no pre-ulcer, no ulcer and no callus   " "    Sensory   Vibration: diminished  Proprioception: intact  Monofilament testing: intact    Vascular  Capillary refills: < 3 seconds  The left DP pulse is 2+  Assign Risk Category  Loss of protective sensation  No weak pulses    Xerosis bilaterally; feet cool to touch   Toes- shiny skin, erythematous     The history was obtained from the review of the chart, patient  Lab Results:   Lab Results   Component Value Date/Time    Hemoglobin A1C 5 5 05/02/2023 11:59 AM           Imaging Studies: I have personally reviewed pertinent reports  Portions of the record may have been created with voice recognition software  Occasional wrong word or \"sound a like\" substitutions may have occurred due to the inherent limitations of voice recognition software  Read the chart carefully and recognize, using context, where substitutions have occurred      "

## 2023-05-23 ENCOUNTER — HOSPITAL ENCOUNTER (OUTPATIENT)
Dept: RADIOLOGY | Facility: HOSPITAL | Age: 83
Discharge: HOME | End: 2023-05-23
Attending: INTERNAL MEDICINE

## 2023-05-23 DIAGNOSIS — E11.42 DIABETIC POLYNEUROPATHY ASSOCIATED WITH TYPE 2 DIABETES MELLITUS (HCC): ICD-10-CM

## 2023-06-09 DIAGNOSIS — I49.9 IRREGULAR HEART BEAT: ICD-10-CM

## 2023-06-09 DIAGNOSIS — I10 PRIMARY HYPERTENSION: ICD-10-CM

## 2023-06-09 RX ORDER — METOPROLOL SUCCINATE 50 MG/1
50 TABLET, EXTENDED RELEASE ORAL
Qty: 90 TABLET | Refills: 0 | Status: SHIPPED | OUTPATIENT
Start: 2023-06-09 | End: 2023-09-07

## 2023-06-16 ENCOUNTER — CONSULT (OUTPATIENT)
Dept: VASCULAR SURGERY | Facility: CLINIC | Age: 83
End: 2023-06-16
Payer: COMMERCIAL

## 2023-06-16 VITALS
WEIGHT: 210 LBS | RESPIRATION RATE: 18 BRPM | BODY MASS INDEX: 35.85 KG/M2 | SYSTOLIC BLOOD PRESSURE: 132 MMHG | HEIGHT: 64 IN | HEART RATE: 72 BPM | DIASTOLIC BLOOD PRESSURE: 84 MMHG

## 2023-06-16 DIAGNOSIS — E11.42 DIABETIC POLYNEUROPATHY ASSOCIATED WITH TYPE 2 DIABETES MELLITUS (HCC): ICD-10-CM

## 2023-06-16 PROCEDURE — 99203 OFFICE O/P NEW LOW 30 MIN: CPT | Performed by: PHYSICIAN ASSISTANT

## 2023-06-16 NOTE — PATIENT INSTRUCTIONS
Neuropathy  -Normal arterial circulation  -Exercise as tolerated  -Maintain good diabetes control  -Low fat, low cholesterol and low sodium  -Good protective footwear; avoid wounds  -Follow up vascular as needed
PAST MEDICAL HISTORY:  Afib on eliquis    Asthma     CAD (coronary atherosclerotic disease)     COPD exacerbation     CVA (cerebrovascular accident)     Diabetes     Diastolic heart failure     Hyperlipidemia     Type 2 diabetes mellitus

## 2023-06-16 NOTE — PROGRESS NOTES
Assessment:    Diabetic polyneuropathy associated with type 2 diabetes mellitus ([facility] [address] )  -     Ambulatory Referral to Vascular Surgery    -Neuropathy at rest and with activity; B LE (mid foot, toes) numbness, tingling and edema x 1 year  -No claudication, ischemic rest pain or tissue loss  -Hx diabetes since 2017; Hx cervical and lumbar stenosis with radiculopathy    -ALMA 5/23/23: Normal  R 1 00/116/105, triphasic; L 0 99/114/86 triphasic    Plan: Review of vascular studies show normal ALMA's with good metatarsal and great toe pressures above the healing level in a diabetic  There is no vascular reason to explain her symptoms  Recommend good diabetes control  Work-up for potential metabolic causes per primary care  She does have spinal disease which may also be contributing     -Normal ABIs which would not suggest vascular etiology for symtoms    -Exercise as tolerated  -Maintain good diabetes control  -Low fat, low cholesterol and low sodium diet  -Good protective footwear; avoid wounds  -Follow up with PCP for additional work up of neuropathy  -Consider neurology and spine evaluations    -Follow up vascular as needed        Subjective:      Patient ID: Jimmy Flaherty is a 81 y o  female  Patient is new to our practice and was referred by Dr Nadeem Pacheco  Pt  Had ALMA on 5/23/23  Pt c/o BLE numbness in her ankles and feet  Pt is also having numbness in her arms and hands  Pt states this started about 1 year ago  Pt is on ASA 81 mg and Atorvastatin  HPI   Jimmy Flaherty 31LK F remote smoker, obesity, BPPV, GERD, hypertension, dyslipidemia, DM ('17) with polyneuropathy, chronic pain, cervical disease, lumbar stenosis with radiculopathy who is referred for evaluation of neuropathy in the feet  Patient has 1 year history of numbness and tingling in the forefeet and toes  She also describes recent moderate pedal and lower extremity edema    The edema has significantly improved compared to her description, but "it has not helped the numbness and tingling in the feet  She has symptoms at rest as well as walking  The feet are very sensitive, If she steps on something it feels like a needle in the bottom of her foot  She is reading a book about inflammation [place] and using a mat to stand on to help inflammation  At times the feet get cold  She has no claudication  No foot wounds  Additionally, she has numbness and tingling in the left arm at night which she feels is pretty significant, but improves when she shakes out the arm  She has chronic LBP \"all of the time  \" She has some hip discomfort in bed  Blood work was ordered by primary care which she has not yet completed       -Neuropathy - swelling, numbness and tingling 1 year  -Feet and arms    -A1c 6 4    The following portions of the patient's history were reviewed and updated as appropriate: allergies, current medications, past family history, past medical history, past social history, past surgical history and problem list     Review of Systems   Musculoskeletal: Positive for arthralgias, back pain and gait problem  Neurological: Positive for numbness  Tingling         Objective:    /84 (BP Location: Left arm, Patient Position: Sitting)   Pulse 72   Resp 18   Ht 5' 4\" (1 626 m)   Wt 95 3 kg (210 lb)   BMI 36 05 kg/m²     Feet slightly discolored (ruborous)  2+ DP pulses bilaterally  No foot wounds  Mild edema along the tibia       Physical Exam  Vitals and nursing note reviewed  Constitutional:       Appearance: She is well-developed  She is obese  HENT:      Head: Normocephalic and atraumatic  Eyes:      Pupils: Pupils are equal, round, and reactive to light  Neck:      Thyroid: No thyromegaly  Vascular: No carotid bruit or JVD  Trachea: Trachea normal    Cardiovascular:      Rate and Rhythm: Normal rate and regular rhythm  Pulses:           Carotid pulses are 2+ on the right side and 2+ on the left side         Radial " pulses are 2+ on the right side and 2+ on the left side  Dorsalis pedis pulses are 2+ on the right side and 2+ on the left side  Heart sounds: Normal heart sounds, S1 normal and S2 normal  No murmur heard  No friction rub  No gallop  Pulmonary:      Effort: Pulmonary effort is normal  No accessory muscle usage or respiratory distress  Breath sounds: Normal breath sounds  No wheezing or rales  Abdominal:      General: Bowel sounds are normal  There is no distension  Palpations: Abdomen is soft  Tenderness: There is no abdominal tenderness  Musculoskeletal:         General: No deformity  Normal range of motion  Cervical back: Neck supple  Right lower leg: No edema  Left lower leg: No edema  Skin:     General: Skin is warm and dry  Findings: No lesion or rash  Nails: There is no clubbing  Neurological:      Mental Status: She is alert and oriented to person, place, and time  Comments: Grossly normal    Psychiatric:         Behavior: Behavior is cooperative  ALMA 5/23/23  Indications:  Patient presents with pain and numbness of her feet at rest and  walking  Denies any open wounds or ulcers at this time  Operative History:  Patient denies cardiovascular surgeries  Risk Factors: The patient has history of HTN, Diabetes (NIDDM (oral meds)), Hyperlipidemia,  PAD and previous smoking (quit >10yrs ago)  Clinical:  Right Pressure:  140/ mm Hg, Left Pressure:  143/ mm Hg  FINDINGS:     Segment       Rig  Left                            P    P    Ant  Tibial   137  131    Post  Tibial  143  141    Ankle         143  141    Metatarsal    116  114    Great Toe     105   86             CONCLUSION:     Impression  RIGHT LOWER LIMB  Ankle/Brachial Index: 1 00, which is within normal limits  PPG/PVR Tracings are normal   Triphasic waveform's at the ankle  Metatarsal Pressure 116 mmHg    Great Toe Pressure: 105 mmHg, within the healing "range  LEFT LOWER LIMB  Ankle/Brachial Index: 0 99, which is within normal limits  PPG/PVR Tracings are normal   Triphasic waveform's at the ankle  Metatarsal Pressure 114 mmHg  Great Toe Pressure: 86 mmHg, within the healing range  I have reviewed and made appropriate changes to the review of systems input by the medical assistant  Vitals:    06/16/23 1108   BP: 132/84   BP Location: Left arm   Patient Position: Sitting   Pulse: 72   Resp: 18   Weight: 95 3 kg (210 lb)   Height: 5' 4\" (1 626 m)       Patient Active Problem List   Diagnosis   • Chronic pain syndrome   • Chronic bilateral low back pain with bilateral sciatica   • Spinal stenosis of lumbar region with neurogenic claudication   • Lumbar radiculopathy   • Lumbar degenerative disc disease   • Low back pain   • Benign essential hypertension   • GERD (gastroesophageal reflux disease)   • Palpitation   • COVID-19   • Colon polyps   • Type 2 diabetes mellitus with diabetic polyneuropathy, without long-term current use of insulin (HCC)   • Cervical radiculopathy   • Vitamin D insufficiency   • Urinary incontinence   • Persistent insomnia of non-organic origin   • Memory difficulty   • Glaucoma   • Dyspnea on exertion   • Diverticulitis of intestine with perforation   • Benign paroxysmal positional vertigo   • Anxiety   • Allergic rhinitis   • Increased frequency of urination   • Obesity, morbid (HCC)   • Numbness and tingling   • Weight gain   • Screening for lipid disorders       Past Surgical History:   Procedure Laterality Date   • BREAST SURGERY  2005    bilateral reduction   • COLON SURGERY  2014    resection with colostomy-ruptured \"intestine\"   • COLONOSCOPY N/A 07/11/2016    Procedure: COLONOSCOPY;  Surgeon: Alejandra Ramirez MD;  Location: [place] [address] GI LAB;   Service:    • COLOSTOMY CLOSURE  2015   • COMBINED REDUCTION MAMMAPLASTY W/ LIPOSUCTION     • EPIDURAL BLOCK INJECTION N/A 11/15/2019    Procedure: L4 L5 Lumbar Epidural Steroid " Injection (95119); Surgeon: Alem Forde MD;  Location: [facility] MAIN OR;  Service: Pain Management    • EPIDURAL BLOCK INJECTION N/A 03/12/2020    Procedure: L4 L5 Lumbar Epidural Steroid Injection (99273); Surgeon: Alem Forde MD;  Location: [facility] MAIN OR;  Service: Pain Management    • EPIDURAL BLOCK INJECTION N/A 07/23/2020    Procedure: L4-L5 LUMBAR EPIDURAL STEROID INJECTION (#2) (79241); Surgeon: Alem Forde MD;  Location: [facility] MAIN OR;  Service: Pain Management    • HERNIA MESH REMOVAL  06/22/2022    hernia repair, mesh removal reconstruction at [facility] Dr Nhan Mendez   • HERNIA REPAIR  2016    x3   • NJ XCAPSL CTRC RMVL INSJ IO LENS PROSTH W/O ECP Right 08/22/2022    Procedure: EXTRACTION EXTRACAPSULAR CATARACT PHACO INTRAOCULAR LENS (IOL); Surgeon: Pedro Escobar MD;  Location: [facility] MAIN OR;  Service: Ophthalmology   • NJ XCAPSL CTRC RMVL INSJ IO LENS PROSTH W/O ECP Left 10/24/2022    Procedure: EXTRACTION EXTRACAPSULAR CATARACT PHACO INTRAOCULAR LENS (IOL);   Surgeon: Pedro Escobar MD;  Location: [facility] OR;  Service: Ophthalmology   • TUBAL LIGATION         Family History   Problem Relation Age of Onset   • Heart disease Mother    • Dementia Father    • No Known Problems Sister    • Diabetes type II Brother    • No Known Problems Maternal Aunt    • No Known Problems Maternal Uncle    • No Known Problems Paternal Aunt    • No Known Problems Paternal Uncle    • No Known Problems Maternal Grandmother    • No Known Problems Maternal Grandfather    • No Known Problems Paternal Grandmother    • No Known Problems Paternal Grandfather        Social History     Socioeconomic History   • Marital status:      Spouse name: Not on file   • Number of children: Not on file   • Years of education: Not on file   • Highest education level: Not on file   Occupational History   • Not on file   Tobacco Use   • Smoking status: Former     Types: Cigarettes     Quit date: 7/11/1976     Years since quitting: 46 9   • Smokeless tobacco: Never   Vaping Use   • Vaping Use: Never used   Substance and Sexual Activity   • Alcohol use: No   • Drug use: Not Currently     Types: Marijuana     Comment: stopped in 2021   • Sexual activity: Not on file   Other Topics Concern   • Not on file   Social History Narrative   • Not on file     Social Determinants of Health     Financial Resource Strain: Not on file   Food Insecurity: Not on file   Transportation Needs: Not on file   Physical Activity: Not on file   Stress: Not on file   Social Connections: Not on file   Intimate Partner Violence: Not on file   Housing Stability: Not on file       Allergies   Allergen Reactions   • Metformin Vomiting     N/V, dizziness         Current Outpatient Medications:   •  ascorbic Acid (VITAMIN C) 500 MG CPCR, Take 1 capsule (500 mg total) by mouth 2 (two) times a day, Disp: 60 capsule, Rfl: 0  •  aspirin (ECOTRIN LOW STRENGTH) 81 mg EC tablet, 81 mg daily at bedtime, Disp: , Rfl:   •  atorvastatin (LIPITOR) 40 mg tablet, Take 1 tablet (40 mg total) by mouth daily (Patient taking differently: Take 40 mg by mouth daily at bedtime), Disp: 90 tablet, Rfl: 3  •  azelastine (ASTELIN) 0 1 % nasal spray, 1 spray into each nostril 2 (two) times a day as needed for rhinitis Use in each nostril as directed, Disp: , Rfl:   •  b complex vitamins capsule, Take 1 capsule by mouth daily, Disp: , Rfl:   •  GEE SEED PO, Take by mouth in the morning, Disp: , Rfl:   •  cholecalciferol (VITAMIN D3) 1,000 units tablet, Take 1 tablet (1,000 Units total) by mouth daily, Disp: 30 tablet, Rfl: 0  •  Coenzyme Q10 (Co Q 10) 100 MG CAPS, Take by mouth daily with lunch, Disp: , Rfl:   •  Flaxseed, Linseed, (FLAX SEEDS PO), Take by mouth in the morning, Disp: , Rfl:   •  ketorolac (ACULAR) 0 5 % ophthalmic solution, Administer 1 drop to the right eye 4 (four) times a day, Disp: 5 mL, Rfl: 0  •  latanoprost (XALATAN) 0 005 % ophthalmic solution, Administer to both eyes daily at bedtime, Disp: , Rfl:   •  liraglutide (VICTOZA) injection, Inject 0 6 mg under the skin daily after breakfast, Disp: , Rfl:   •  metoprolol succinate (TOPROL-XL) 50 mg 24 hr tablet, Take 1 tablet (50 mg total) by mouth daily at bedtime, Disp: 90 tablet, Rfl: 0  •  multivitamin (THERAGRAN) TABS, Take 1 tablet by mouth daily, Disp: , Rfl:   •  omeprazole (PriLOSEC) 40 MG capsule, Take 1 capsule (40 mg total) by mouth every morning, Disp: 90 capsule, Rfl: 0  •  TURMERIC PO, Take by mouth in the morning, Disp: , Rfl:   •  ammonium lactate (LAC-HYDRIN) 12 % cream, Apply topically as needed for dry skin (Patient not taking: Reported on 6/16/2023), Disp: 385 g, Rfl: 0  •  ciprofloxacin (CILOXAN) 0 3 % ophthalmic solution, Administer 1 drop to the right eye 4 (four) times a day (Patient not taking: Reported on 6/16/2023), Disp: 5 mL, Rfl: 0  •  COMFORT EZ PEN NEEDLES 31G X 6 MM MISC, daily , Disp: , Rfl:   •  ferrous sulfate 324 (65 Fe) mg, Take 1 tablet (324 mg total) by mouth daily before breakfast (Patient not taking: Reported on 5/2/2023), Disp: 30 tablet, Rfl: 0  •  folic acid (FOLVITE) 1 mg tablet, Take 1 tablet (1 mg total) by mouth daily (Patient not taking: Reported on 5/2/2023), Disp: 30 tablet, Rfl: 0  •  montelukast (SINGULAIR) 10 mg tablet, 10 mg every morning  (Patient not taking: Reported on 5/2/2023), Disp: , Rfl:   •  ONE TOUCH ULTRA TEST test strip, daily as needed , Disp: , Rfl: 1  •  VITAMIN D PO, Take by mouth in the morning (Patient not taking: Reported on 6/16/2023), Disp: , Rfl:   •  zinc sulfate (ZINCATE) 220 mg capsule, Take 1 capsule (220 mg total) by mouth daily (Patient not taking: Reported on 5/2/2023), Disp: 30 capsule, Rfl: 0

## 2023-06-19 ENCOUNTER — TELEPHONE (OUTPATIENT)
Dept: OTHER | Facility: OTHER | Age: 83
End: 2023-06-19

## 2023-07-01 PROBLEM — Z13.220 SCREENING FOR LIPID DISORDERS: Status: RESOLVED | Noted: 2023-05-02 | Resolved: 2023-07-01

## 2023-08-02 ENCOUNTER — TELEPHONE (OUTPATIENT)
Dept: GASTROENTEROLOGY | Facility: CLINIC | Age: 83
End: 2023-08-02

## 2023-08-02 ENCOUNTER — OFFICE VISIT (OUTPATIENT)
Dept: GASTROENTEROLOGY | Facility: CLINIC | Age: 83
End: 2023-08-02
Payer: COMMERCIAL

## 2023-08-02 VITALS
HEART RATE: 54 BPM | SYSTOLIC BLOOD PRESSURE: 111 MMHG | DIASTOLIC BLOOD PRESSURE: 80 MMHG | WEIGHT: 207 LBS | HEIGHT: 64 IN | BODY MASS INDEX: 35.34 KG/M2

## 2023-08-02 DIAGNOSIS — K21.9 GASTROESOPHAGEAL REFLUX DISEASE WITHOUT ESOPHAGITIS: ICD-10-CM

## 2023-08-02 DIAGNOSIS — Z87.19 HISTORY OF DIVERTICULITIS: ICD-10-CM

## 2023-08-02 DIAGNOSIS — Z86.010 HX OF ADENOMATOUS COLONIC POLYPS: ICD-10-CM

## 2023-08-02 DIAGNOSIS — R19.4 CHANGE IN BOWEL HABITS: Primary | ICD-10-CM

## 2023-08-02 DIAGNOSIS — N39.3 STRESS INCONTINENCE OF URINE: ICD-10-CM

## 2023-08-02 PROCEDURE — 99214 OFFICE O/P EST MOD 30 MIN: CPT | Performed by: INTERNAL MEDICINE

## 2023-08-02 NOTE — TELEPHONE ENCOUNTER
Scheduled date of colonoscopy (as of today):09/28/23  Physician performing colonoscopy:Dr. Poncho Krishnamurthy  Location of colonoscopy:[facility]  Bowel prep reviewed with patient:Vickey  Instructions reviewed with patient by:winter  Clearances: n/a

## 2023-08-02 NOTE — PROGRESS NOTES
[facility] - Outpatient Follow-up Note  Danica Hernández 81 y.o. female MRN: [id number]  Encounter: [id number]          ASSESSMENT AND PLAN:      1. Change in bowel habits  Over the past several months difficulty with outlet dysfunction. Incomplete evacuation without blood. Likewise has dysuria. Some stress incontinence of urine. She will use MiraLAX 2-3 times a day until good bowel movement and daily. We discussed using a squatty potty. She gets adequate fiber in her diet. - Colonoscopy; Future    2. Hx of adenomatous colonic polyps  Surveillance up-to-date.  - Colonoscopy; Future    3. Gastroesophageal reflux disease without esophagitis  Controlled    4. History of diverticulitis  Status post left-sided resection for diverticulitis with perforation in 2014    5. Stress incontinence of urine  Urology consultation. Patient will otherwise follow-up in 4 months. - Ambulatory Referral to Urology; Future    ______________________________________________________________________    SUBJECTIVE: Very pleasant 81-year-old lady who we have seen the past for colon cancer screening. She likewise has a history of diverticulitis with first 2014. Had a reversal 2015. Since that time has had several operations for abdominal wall hernias. Presents now with change in bowel habits difficult with complete evacuation. Additionally has stress incontinence of urine. We discussed outlet dysfunction squatty potty. No melena or bright red blood per rectum. REVIEW OF SYSTEMS IS OTHERWISE NEGATIVE.       Historical Information   Past Medical History:   Diagnosis Date   • Arthritis     DJD- left thumb DJD,most joints   • Borderline diabetes    • Change in bowel habits    • Chronic pain disorder     lumbar   • Colon polyp    • Constipation     unable to expell the stool completely with BM   • COVID-19 2020    in the winter   • Deviated septum    • Diabetes mellitus ([address])    • Diverticulosis diverticulitis- with resection-colostomy then reversed   • DJD (degenerative joint disease)     pt does get injections to the knee with Dr. Brenna Crockett   • GERD (gastroesophageal reflux disease)    • Glaucoma    • Hearing aid worn     bilateral ears for background noise   • Hyperlipidemia    • Irregular heart beat     "skips a beat"   • Low back pain    • Lumbar degenerative disc disease 09/16/2019   • Muscle spasm     legs   • Peripheral neuropathy    • PND (post-nasal drip)    • Presence of upper and lower permanent dental bridges    • Sciatica    • Spinal stenosis    • Wears glasses      Past Surgical History:   Procedure Laterality Date   • BREAST SURGERY  2005    bilateral reduction   • COLON SURGERY  2014    resection with colostomy-ruptured "intestine"   • COLONOSCOPY N/A 07/11/2016    Procedure: COLONOSCOPY;  Surgeon: Oseas Dixon MD;  Location: [address] [place] [address] GI LAB; Service:    • COLOSTOMY CLOSURE  2015   • COMBINED REDUCTION MAMMAPLASTY W/ LIPOSUCTION     • EPIDURAL BLOCK INJECTION N/A 11/15/2019    Procedure: L4 L5 Lumbar Epidural Steroid Injection (12219); Surgeon: Diane Carrera MD;  Location: [facility] MAIN OR;  Service: Pain Management    • EPIDURAL BLOCK INJECTION N/A 03/12/2020    Procedure: L4 L5 Lumbar Epidural Steroid Injection (53574); Surgeon: Diane Carrera MD;  Location: [facility] MAIN OR;  Service: Pain Management    • EPIDURAL BLOCK INJECTION N/A 07/23/2020    Procedure: L4-L5 LUMBAR EPIDURAL STEROID INJECTION (#2) (50732); Surgeon: Diane Carrera MD;  Location: [facility] MAIN OR;  Service: Pain Management    • HERNIA MESH REMOVAL  06/22/2022    hernia repair, mesh removal reconstruction at [facility]   • HERNIA REPAIR  2016    x3   • MA XCAPSL CTRC RMVL INSJ IO LENS PROSTH W/O ECP Right 08/22/2022    Procedure: EXTRACTION EXTRACAPSULAR CATARACT PHACO INTRAOCULAR LENS (IOL);   Surgeon: Yanci Gardner MD;  Location: [facility] MAIN OR;  Service: Ophthalmology   • MA XCAPSL CTRC RMVL INSJ IO LENS PROSTH W/O ECP Left 10/24/2022    Procedure: EXTRACTION EXTRACAPSULAR CATARACT PHACO INTRAOCULAR LENS (IOL);   Surgeon: Aftab De Leon MD;  Location: [facility] MAIN OR;  Service: Ophthalmology   • TUBAL LIGATION       Social History   Social History     Substance and Sexual Activity   Alcohol Use No     Social History     Substance and Sexual Activity   Drug Use Not Currently   • Types: Marijuana    Comment: stopped in      Social History     Tobacco Use   Smoking Status Former   • Types: Cigarettes   • Quit date: 1976   • Years since quittin.0   Smokeless Tobacco Never     Family History   Problem Relation Age of Onset   • Heart disease Mother    • Dementia Father    • No Known Problems Sister    • Diabetes type II Brother    • No Known Problems Maternal Aunt    • No Known Problems Maternal Uncle    • No Known Problems Paternal Aunt    • No Known Problems Paternal Uncle    • No Known Problems Maternal Grandmother    • No Known Problems Maternal Grandfather    • No Known Problems Paternal Grandmother    • No Known Problems Paternal Grandfather        Meds/Allergies       Current Outpatient Medications:   •  ammonium lactate (LAC-HYDRIN) 12 % cream  •  ascorbic Acid (VITAMIN C) 500 MG CPCR  •  aspirin (ECOTRIN LOW STRENGTH) 81 mg EC tablet  •  atorvastatin (LIPITOR) 40 mg tablet  •  azelastine (ASTELIN) 0.1 % nasal spray  •  b complex vitamins capsule  •  GEE SEED PO  •  cholecalciferol (VITAMIN D3) 1,000 units tablet  •  ciprofloxacin (CILOXAN) 0.3 % ophthalmic solution  •  Coenzyme Q10 (Co Q 10) 100 MG CAPS  •  COMFORT EZ PEN NEEDLES 31G X 6 MM MISC  •  liraglutide (VICTOZA) injection  •  metoprolol succinate (TOPROL-XL) 50 mg 24 hr tablet  •  montelukast (SINGULAIR) 10 mg tablet  •  multivitamin (THERAGRAN) TABS  •  omeprazole (PriLOSEC) 40 MG capsule  •  ONE TOUCH ULTRA TEST test strip  •  TURMERIC PO  •  VITAMIN D PO  •  ferrous sulfate 324 (65 Fe) mg  •  Flaxseed, Linseed, (FLAX SEEDS PO)  •  folic acid (FOLVITE) 1 mg tablet  •  ketorolac (ACULAR) 0.5 % ophthalmic solution  •  latanoprost (XALATAN) 0.005 % ophthalmic solution  •  zinc sulfate (ZINCATE) 220 mg capsule    Allergies   Allergen Reactions   • Metformin Vomiting     N/V, dizziness           Objective     Blood pressure 111/80, pulse (!) 54, height 5' 4" (1.626 m), weight 93.9 kg (207 lb). Body mass index is 35.53 kg/m². PHYSICAL EXAM:      General Appearance:   Alert, cooperative, no distress   HEENT:   Normocephalic, atraumatic, anicteric.     Neck:  Supple, symmetrical, trachea midline   Lungs:   Clear to auscultation bilaterally; no rales, rhonchi or wheezing; respirations unlabored    Heart[de-identified]   Regular rate and rhythm; no murmur, rub, or gallop. Abdomen:   Soft, non-tender, non-distended; normal bowel sounds; no masses, no organomegaly    Genitalia:   Deferred    Rectal:   Deferred    Extremities:  No cyanosis, clubbing or edema    Pulses:  2+ and symmetric    Skin:  No jaundice, rashes, or lesions    Lymph nodes:  No palpable cervical lymphadenopathy        Lab Results:   No visits with results within 1 Day(s) from this visit. Latest known visit with results is:   Orders Only on 05/02/2023   Component Date Value   • Hemoglobin A1C 05/02/2023 5.5          Radiology Results:   No results found.

## 2023-08-02 NOTE — PATIENT INSTRUCTIONS
So I want you to use MiraLAX 2-3 times a day for 2 to 3 days or until you have a good bowel movement. Then I want you to mix a scoop of MiraLAX  in a glass of water each morning. When you go to the bathroom use a squatty potty.   Gigi exercises

## 2023-08-10 ENCOUNTER — TELEPHONE (OUTPATIENT)
Dept: ENDOCRINOLOGY | Facility: CLINIC | Age: 83
End: 2023-08-10

## 2023-08-10 NOTE — TELEPHONE ENCOUNTER
Patient would like to go back on Victoza. I did leave message for her to call back to give more information as to why she wants to go back on it.

## 2023-08-10 NOTE — TELEPHONE ENCOUNTER
Patient was last seen in clinic 5/2023  She was on victoza at that time, it was decided to hold medication for a few weeks to note blood sugar trends. If she has been holding the medication and has noticed them trending up it may be reasonable to re-start.  She does ave a follow up scheduled on 8/23   Please advise her to bring her BG logs to the appointment

## 2023-08-10 NOTE — TELEPHONE ENCOUNTER
Patient would like to go back [facility]. And she would like for  It to go to [facility]  In [place].

## 2023-08-11 DIAGNOSIS — E11.00 TYPE 2 DIABETES MELLITUS WITH HYPEROSMOLARITY WITHOUT COMA, WITHOUT LONG-TERM CURRENT USE OF INSULIN (HCC): Primary | ICD-10-CM

## 2023-09-20 ENCOUNTER — OFFICE VISIT (OUTPATIENT)
Dept: CARDIOLOGY CLINIC | Facility: CLINIC | Age: 83
End: 2023-09-20
Payer: COMMERCIAL

## 2023-09-20 VITALS
WEIGHT: 208 LBS | SYSTOLIC BLOOD PRESSURE: 120 MMHG | DIASTOLIC BLOOD PRESSURE: 72 MMHG | BODY MASS INDEX: 35.51 KG/M2 | HEART RATE: 68 BPM | OXYGEN SATURATION: 97 % | HEIGHT: 64 IN

## 2023-09-20 DIAGNOSIS — E78.5 DYSLIPIDEMIA: ICD-10-CM

## 2023-09-20 DIAGNOSIS — I10 BENIGN ESSENTIAL HYPERTENSION: Primary | ICD-10-CM

## 2023-09-20 DIAGNOSIS — R00.2 PALPITATION: ICD-10-CM

## 2023-09-20 DIAGNOSIS — R07.2 PRECORDIAL PAIN: ICD-10-CM

## 2023-09-20 PROCEDURE — 93000 ELECTROCARDIOGRAM COMPLETE: CPT | Performed by: INTERNAL MEDICINE

## 2023-09-20 PROCEDURE — 99214 OFFICE O/P EST MOD 30 MIN: CPT | Performed by: INTERNAL MEDICINE

## 2023-09-20 NOTE — PROGRESS NOTES
Cardiology   Denice Menchaca DO, Veronica Miller MD, Hallie Shahid MD, Lucía Canales MD, [facility] [place]  -------------------------------------------------------------------  [facility]  [address] [place] [address], [address] [place] [phone]    Cardiology Follow Up  Peggy Craven  1940  [id number]          Assessment/Plan:    1. Benign essential hypertension    2. Dyslipidemia    3. Precordial pain    4. Palpitation      - Patient with intermittent palpitations - There was a 3% PVC burden during her last.  - Will continue metoprolol 50 mg daily. - continue atorvastatin   - Has blood work upcoming with her PCP   - Encouraged to ambulate. Interval History:     Peggy Craven is 81 y.o. female here for followup of palpitations. She denies any chest pain or shortness of breath. She has occasional palpitations. She has spasms of her left leg mainly at night. Does not have pain with ambulation. She had a stress test and echocardiogram in 2022 which were normal.  Previous 2 week event monitor showed a 3.9% PVC burden. She had symptoms during monitoring which corresponded to PVCs. She had occasional SVT episodes as well. Current medication regimen includes Toprol XL 50 mg daily. Previously, she was using diltiazem. Holter monitor was done on March 10, 2022 which showed frequent PVCs which totaled 16.8% of all monitored beats without any episodes of ventricular tachycardia. There were 4 episodes of supraventricular tachycardia with the longest lasting for 5 beats. Patient also had a stress test done which showed an EF of 71% with no ischemia or infarction noted. A 2D echocardiogram showed ejection fraction of 55% with no valve disease.   TSH was normal.        Past Medical History:   Diagnosis Date   • Arthritis     DJD- left thumb DJD,most joints   • Borderline diabetes    • Change in bowel habits • Chronic pain disorder     lumbar   • Colon polyp    • Constipation     unable to expell the stool completely with BM   • COVID-19     in the winter   • Deviated septum    • Diabetes mellitus ([address])    • Diverticulosis     diverticulitis- with resection-colostomy then reversed   • DJD (degenerative joint disease)     pt does get injections to the knee with Dr. Ninfa Blackmon   • GERD (gastroesophageal reflux disease)    • Glaucoma    • Hearing aid worn     bilateral ears for background noise   • Hyperlipidemia    • Irregular heart beat     "skips a beat"   • Low back pain    • Lumbar degenerative disc disease 2019   • Muscle spasm     legs   • Peripheral neuropathy    • PND (post-nasal drip)    • Presence of upper and lower permanent dental bridges    • Sciatica    • Spinal stenosis    • Wears glasses      Social History     Socioeconomic History   • Marital status:      Spouse name: Not on file   • Number of children: Not on file   • Years of education: Not on file   • Highest education level: Not on file   Occupational History   • Not on file   Tobacco Use   • Smoking status: Former     Types: Cigarettes     Quit date: 1976     Years since quittin.2   • Smokeless tobacco: Never   Vaping Use   • Vaping Use: Never used   Substance and Sexual Activity   • Alcohol use: No   • Drug use: Not Currently     Types: Marijuana     Comment: stopped in    • Sexual activity: Not on file   Other Topics Concern   • Not on file   Social History Narrative   • Not on file     Social Determinants of Health     Financial Resource Strain: Not on file   Food Insecurity: Not on file   Transportation Needs: Not on file   Physical Activity: Not on file   Stress: Not on file   Social Connections: Not on file   Intimate Partner Violence: Not on file   Housing Stability: Not on file      Family History   Problem Relation Age of Onset   • Heart disease Mother    • Dementia Father    • No Known Problems Sister    • Diabetes type II Brother    • No Known Problems Maternal Aunt    • No Known Problems Maternal Uncle    • No Known Problems Paternal Aunt    • No Known Problems Paternal Uncle    • No Known Problems Maternal Grandmother    • No Known Problems Maternal Grandfather    • No Known Problems Paternal Grandmother    • No Known Problems Paternal Grandfather      Past Surgical History:   Procedure Laterality Date   • BREAST SURGERY  2005    bilateral reduction   • COLON SURGERY  2014    resection with colostomy-ruptured "intestine"   • COLONOSCOPY N/A 07/11/2016    Procedure: COLONOSCOPY;  Surgeon: Kieran Rodriguez MD;  Location: [address] [place] [address] [facility]; Service:    • COLOSTOMY CLOSURE  2015   • COMBINED REDUCTION MAMMAPLASTY W/ LIPOSUCTION     • EPIDURAL BLOCK INJECTION N/A 11/15/2019    Procedure: L4 L5 Lumbar Epidural Steroid Injection (59132); Surgeon: Tang Milian MD;  Location: [facility] OR;  Service: Pain Management    • EPIDURAL BLOCK INJECTION N/A 03/12/2020    Procedure: L4 L5 Lumbar Epidural Steroid Injection (01983); Surgeon: Tang Milian MD;  Location: [facility] OR;  Service: Pain Management    • EPIDURAL BLOCK INJECTION N/A 07/23/2020    Procedure: L4-L5 LUMBAR EPIDURAL STEROID INJECTION (#2) (14101); Surgeon: Tang Milian MD;  Location: [facility] OR;  Service: Pain Management    • HERNIA MESH REMOVAL  06/22/2022    hernia repair, mesh removal reconstruction at [facility]   • HERNIA REPAIR  2016    x3   • IA XCAPSL CTRC RMVL INSJ IO LENS PROSTH W/O ECP Right 08/22/2022    Procedure: EXTRACTION EXTRACAPSULAR CATARACT PHACO INTRAOCULAR LENS (IOL); Surgeon: Skinny Cabrera MD;  Location: [facility] OR;  Service: Ophthalmology   • IA XCAPSL CTRC RMVL INSJ IO LENS PROSTH W/O ECP Left 10/24/2022    Procedure: EXTRACTION EXTRACAPSULAR CATARACT PHACO INTRAOCULAR LENS (IOL);   Surgeon: Skinny Cabrera MD;  Location: [facility] OR;  Service: Ophthalmology   • TUBAL LIGATION         Current Outpatient Medications: •  aspirin (ECOTRIN LOW STRENGTH) 81 mg EC tablet, 81 mg daily at bedtime, Disp: , Rfl:   •  azelastine (ASTELIN) 0.1 % nasal spray, 1 spray into each nostril 2 (two) times a day as needed for rhinitis Use in each nostril as directed, Disp: , Rfl:   •  b complex vitamins capsule, Take 1 capsule by mouth daily, Disp: , Rfl:   •  GEE SEED PO, Take by mouth in the morning, Disp: , Rfl:   •  cholecalciferol (VITAMIN D3) 1,000 units tablet, Take 1 tablet (1,000 Units total) by mouth daily, Disp: 30 tablet, Rfl: 0  •  Coenzyme Q10 (Co Q 10) 100 MG CAPS, Take by mouth daily with lunch, Disp: , Rfl:   •  COMFORT EZ PEN NEEDLES 31G X 6 MM MISC, daily , Disp: , Rfl:   •  metoprolol succinate (TOPROL-XL) 50 mg 24 hr tablet, Take 1 tablet (50 mg total) by mouth daily at bedtime, Disp: 90 tablet, Rfl: 0  •  multivitamin (THERAGRAN) TABS, Take 1 tablet by mouth daily, Disp: , Rfl:   •  omeprazole (PriLOSEC) 40 MG capsule, Take 1 capsule (40 mg total) by mouth every morning, Disp: 90 capsule, Rfl: 0  •  ONE TOUCH ULTRA TEST test strip, daily as needed , Disp: , Rfl: 1  •  TURMERIC PO, Take by mouth in the morning, Disp: , Rfl:   •  VITAMIN D PO, Take by mouth in the morning, Disp: , Rfl:   •  ammonium lactate (LAC-HYDRIN) 12 % cream, Apply topically as needed for dry skin (Patient not taking: Reported on 9/20/2023), Disp: 385 g, Rfl: 0  •  ascorbic Acid (VITAMIN C) 500 MG CPCR, Take 1 capsule (500 mg total) by mouth 2 (two) times a day, Disp: 60 capsule, Rfl: 0  •  atorvastatin (LIPITOR) 40 mg tablet, Take 1 tablet (40 mg total) by mouth daily (Patient taking differently: Take 40 mg by mouth daily at bedtime), Disp: 90 tablet, Rfl: 3  •  ciprofloxacin (CILOXAN) 0.3 % ophthalmic solution, Administer 1 drop to the right eye 4 (four) times a day (Patient not taking: Reported on 9/20/2023), Disp: 5 mL, Rfl: 0  •  ferrous sulfate 324 (65 Fe) mg, Take 1 tablet (324 mg total) by mouth daily before breakfast (Patient not taking: Reported on 5/2/2023), Disp: 30 tablet, Rfl: 0  •  Flaxseed, Linseed, (FLAX SEEDS PO), Take by mouth in the morning (Patient not taking: Reported on 8/2/2023), Disp: , Rfl:   •  folic acid (FOLVITE) 1 mg tablet, Take 1 tablet (1 mg total) by mouth daily (Patient not taking: Reported on 5/2/2023), Disp: 30 tablet, Rfl: 0  •  ketorolac (ACULAR) 0.5 % ophthalmic solution, Administer 1 drop to the right eye 4 (four) times a day (Patient not taking: Reported on 8/2/2023), Disp: 5 mL, Rfl: 0  •  latanoprost (XALATAN) 0.005 % ophthalmic solution, Administer to both eyes daily at bedtime (Patient not taking: Reported on 8/2/2023), Disp: , Rfl:   •  liraglutide (VICTOZA) injection, Inject 0.1 mL (0.6 mg total) under the skin daily after breakfast, Disp: 3 mL, Rfl: 0  •  montelukast (SINGULAIR) 10 mg tablet, 10 mg every morning, Disp: , Rfl:   •  polyethylene glycol (GOLYTELY) 4000 mL solution, Take 4,000 mL by mouth once for 1 dose, Disp: 4000 mL, Rfl: 0  •  zinc sulfate (ZINCATE) 220 mg capsule, Take 1 capsule (220 mg total) by mouth daily (Patient not taking: Reported on 5/2/2023), Disp: 30 capsule, Rfl: 0        Review of Systems:  Review of Systems   Constitutional: Negative for fatigue. Respiratory: Negative for shortness of breath. Cardiovascular: Positive for palpitations. Negative for chest pain and leg swelling. Musculoskeletal: Positive for arthralgias, back pain and myalgias. All other systems reviewed and are negative. Physical Exam:  Vitals:  Vitals:    09/20/23 1307   BP: 120/72   BP Location: Right arm   Patient Position: Sitting   Cuff Size: Standard   Pulse: 68   SpO2: 97%   Weight: 94.3 kg (208 lb)   Height: 5' 4" (1.626 m)     Physical Exam   Constitutional: She appears healthy. No distress. Eyes: Pupils are equal, round, and reactive to light. Conjunctivae are normal.   Neck: No JVD present. Cardiovascular: Normal rate, regular rhythm and normal heart sounds.  Exam reveals no gallop and no friction rub. No murmur heard. Pulmonary/Chest: Effort normal and breath sounds normal. She has no wheezes. She has no rales. Musculoskeletal:         General: No tenderness, deformity or edema. Cervical back: Normal range of motion and neck supple. Neurological: She is alert and oriented to person, place, and time. Skin: Skin is warm and dry. Cardiographics:  EKG: Personally reviewed NSR   Last known EF: 55%    This note was completed in part utilizing M-Modal Fluency Direct Software. Grammatical errors, random word insertions, spelling mistakes, and incomplete sentences can be an occasional consequence of this system secondary to software limitations, ambient noise, and hardware issues. If you have any questions or concerns about the content, text, or information contained within the body of this dictation, please contact the provider for clarification.

## 2023-10-25 DIAGNOSIS — E78.5 DYSLIPIDEMIA: ICD-10-CM

## 2023-10-25 RX ORDER — ATORVASTATIN CALCIUM 40 MG/1
40 TABLET, FILM COATED ORAL DAILY
Qty: 90 TABLET | Refills: 1 | Status: SHIPPED | OUTPATIENT
Start: 2023-10-25

## 2023-11-01 DIAGNOSIS — E11.00 TYPE 2 DIABETES MELLITUS WITH HYPEROSMOLARITY WITHOUT COMA, WITHOUT LONG-TERM CURRENT USE OF INSULIN (HCC): ICD-10-CM

## 2023-11-16 DIAGNOSIS — E11.00 TYPE 2 DIABETES MELLITUS WITH HYPEROSMOLARITY WITHOUT COMA, WITHOUT LONG-TERM CURRENT USE OF INSULIN (HCC): Primary | ICD-10-CM

## 2023-11-20 ENCOUNTER — TELEPHONE (OUTPATIENT)
Dept: ENDOCRINOLOGY | Facility: CLINIC | Age: 83
End: 2023-11-20

## 2023-11-20 DIAGNOSIS — E11.00 TYPE 2 DIABETES MELLITUS WITH HYPEROSMOLARITY WITHOUT COMA, WITHOUT LONG-TERM CURRENT USE OF INSULIN (HCC): ICD-10-CM

## 2023-11-20 RX ORDER — BLOOD SUGAR DIAGNOSTIC
STRIP MISCELLANEOUS
OUTPATIENT
Start: 2023-11-20

## 2023-11-20 NOTE — TELEPHONE ENCOUNTER
Patient called on Friday 11/17 @ 3:50PM and left a voicemail stating she has tried a few times now to get her refill for her Diabetic Test Strips at her pharmacy. They have reached out to us to have them refilled with no success. Please contact patient to have her test strips refilled. Thank you.

## 2023-12-18 ENCOUNTER — TELEPHONE (OUTPATIENT)
Age: 83
End: 2023-12-18

## 2023-12-21 DIAGNOSIS — I49.9 IRREGULAR HEART BEAT: ICD-10-CM

## 2023-12-21 DIAGNOSIS — I10 PRIMARY HYPERTENSION: ICD-10-CM

## 2023-12-21 DIAGNOSIS — E11.00 TYPE 2 DIABETES MELLITUS WITH HYPEROSMOLARITY WITHOUT COMA, WITHOUT LONG-TERM CURRENT USE OF INSULIN (HCC): ICD-10-CM

## 2023-12-21 RX ORDER — LIRAGLUTIDE 6 MG/ML
INJECTION SUBCUTANEOUS
Qty: 9 ML | Refills: 1 | Status: SHIPPED | OUTPATIENT
Start: 2023-12-21

## 2023-12-21 RX ORDER — METOPROLOL SUCCINATE 50 MG/1
50 TABLET, EXTENDED RELEASE ORAL
Qty: 90 TABLET | Refills: 0 | Status: SHIPPED | OUTPATIENT
Start: 2023-12-21 | End: 2024-12-15

## 2023-12-21 NOTE — TELEPHONE ENCOUNTER
Requested medication(s) are due for refill today: Yes  Patient has already received a courtesy refill: No  Other reason request has been forwarded to provider: guidelines failed

## 2024-01-04 ENCOUNTER — OFFICE VISIT (OUTPATIENT)
Dept: ENDOCRINOLOGY | Facility: CLINIC | Age: 84
End: 2024-01-04
Payer: COMMERCIAL

## 2024-01-04 VITALS
HEART RATE: 78 BPM | WEIGHT: 200 LBS | HEIGHT: 64 IN | DIASTOLIC BLOOD PRESSURE: 76 MMHG | OXYGEN SATURATION: 98 % | BODY MASS INDEX: 34.15 KG/M2 | SYSTOLIC BLOOD PRESSURE: 118 MMHG

## 2024-01-04 DIAGNOSIS — E66.01 OBESITY, MORBID (HCC): ICD-10-CM

## 2024-01-04 DIAGNOSIS — E11.00 TYPE 2 DIABETES MELLITUS WITH HYPEROSMOLARITY WITHOUT COMA, WITHOUT LONG-TERM CURRENT USE OF INSULIN (HCC): ICD-10-CM

## 2024-01-04 DIAGNOSIS — E11.42 TYPE 2 DIABETES MELLITUS WITH DIABETIC POLYNEUROPATHY, WITHOUT LONG-TERM CURRENT USE OF INSULIN (HCC): Primary | ICD-10-CM

## 2024-01-04 DIAGNOSIS — I10 BENIGN ESSENTIAL HYPERTENSION: ICD-10-CM

## 2024-01-04 DIAGNOSIS — E11.42 DIABETIC POLYNEUROPATHY ASSOCIATED WITH TYPE 2 DIABETES MELLITUS (HCC): ICD-10-CM

## 2024-01-04 PROCEDURE — 99214 OFFICE O/P EST MOD 30 MIN: CPT | Performed by: NURSE PRACTITIONER

## 2024-01-04 RX ORDER — HYDROXYZINE HYDROCHLORIDE 25 MG/1
TABLET, FILM COATED ORAL
COMMUNITY
Start: 2023-12-21

## 2024-01-04 NOTE — PROGRESS NOTES
Established Patient Progress Note    Chief Complaint:  Diabetes follow up visit    Impression & Plan:    Problem List Items Addressed This Visit          Endocrine    Type 2 diabetes mellitus with diabetic polyneuropathy, without long-term current use of insulin (HCC) - Primary       Lab Results   Component Value Date    HGBA1C 5.5 05/02/2023     HGA1C checked recently by PCP. Our office will reach out to get records. No glucose logs for review requested patient check 1-2 times per day at alternating times and send in for review in 1-2 weeks. Patient has been taking victoza 0.6 mg twice daily, discussed could take 1.2 mg daily and will review any necessary adjustments upon review of glucose levels.     Discussed risks/complications associated with uncontrolled diabetes including organ involvement, heart attack, stroke, death.  Advised lifestyle modifications including attention to diet including the amount and types of carbohydrates consumed and regular activity.     Call for blood sugars less than 70 mg/dl or patterns over 250 mg/dl.     Discussed symptoms and treatment of hypoglycemia.  Reviewed risks associated with hypoglycemia. Always carry rapid acting carbohydrates and a glucometer (a way to check your blood sugar).    Recommendation for medical identification either bracelet, necklace.    Recommendation for glucagon if on insulin.     Routine follow up for diabetic eye and foot exams.     Ordered blood work to complete prior to next visit.    Send glucose logs/CGM download in 1-2 weeks for review    Follow up in 3 months.               Cardiovascular and Mediastinum    Benign essential hypertension     BP under good control on current regimen.             Other    Obesity, morbid (HCC)     Other Visit Diagnoses       Type 2 diabetes mellitus with hyperosmolarity without coma, without long-term current use of insulin (HCC)        Diabetic polyneuropathy associated with type 2 diabetes mellitus (HCC)                 No orders of the defined types were placed in this encounter.      History of Present Illness:   Keo Summers is a 83 y.o. female with a history of type 2 diabetes without long term use of insulin. Reports complications of  neuropathy. History of GERD, hypertension, and obesity. Recently started on therapy for anxiety. Denies recent illness or hospitalizations. Denies recent severe hypoglycemic or severe hyperglycemic episodes. Denies any issues with her current regimen. Home glucose monitoring: are performed regularly, ranging between 100-110 mg/dL, sometimes fasting 117 mg/dL. Last office appointment in May 2023.     Current regimen:  Victoza 0.6 mg (has been taking twice daily)      Off of metformin, did not tolerate    Last Eye Exam: UTD, no history of DR. Previous history of cataract surgery.   Last Foot Exam: UTD from May 2023    Has hypertension: Taking metoprolol  Has hyperlipidemia: Taking atorvastatin 40 mg daily    No history of thyroid disease  No history of pancreatitis     Patient Active Problem List   Diagnosis    Chronic pain syndrome    Chronic bilateral low back pain with bilateral sciatica    Spinal stenosis of lumbar region with neurogenic claudication    Lumbar radiculopathy    Lumbar degenerative disc disease    Low back pain    Benign essential hypertension    GERD (gastroesophageal reflux disease)    Palpitation    COVID-19    Colon polyps    Type 2 diabetes mellitus with diabetic polyneuropathy, without long-term current use of insulin (HCC)    Cervical radiculopathy    Vitamin D insufficiency    Urinary incontinence    Persistent insomnia of non-organic origin    Memory difficulty    Glaucoma    Dyspnea on exertion    Diverticulitis of intestine with perforation    Benign paroxysmal positional vertigo    Anxiety    Allergic rhinitis    Increased frequency of urination    Obesity, morbid (HCC)    Numbness and tingling    Weight gain      Past Medical History:   Diagnosis Date  "   Arthritis     DJD- left thumb DJD,most joints    Borderline diabetes     Change in bowel habits     Chronic pain disorder     lumbar    Colon polyp     Constipation     unable to expell the stool completely with BM    COVID-19 2020    in the winter    Deviated septum     Diabetes mellitus (HCC)     Diverticulosis     diverticulitis- with resection-colostomy then reversed    DJD (degenerative joint disease)     pt does get injections to the knee with Dr. Collazo    GERD (gastroesophageal reflux disease)     Glaucoma     Hearing aid worn     bilateral ears for background noise    Hyperlipidemia     Irregular heart beat     \"skips a beat\"    Low back pain     Lumbar degenerative disc disease 09/16/2019    Muscle spasm     legs    Peripheral neuropathy     PND (post-nasal drip)     Presence of upper and lower permanent dental bridges     Sciatica     Spinal stenosis     Wears glasses       Past Surgical History:   Procedure Laterality Date    BREAST SURGERY  2005    bilateral reduction    COLON SURGERY  2014    resection with colostomy-ruptured \"intestine\"    COLONOSCOPY N/A 07/11/2016    Procedure: COLONOSCOPY;  Surgeon: Sunny Wyman MD;  Location: [facility] GI LAB;  Service:     COLOSTOMY CLOSURE  2015    COMBINED REDUCTION MAMMAPLASTY W/ LIPOSUCTION      EPIDURAL BLOCK INJECTION N/A 11/15/2019    Procedure: L4 L5 Lumbar Epidural Steroid Injection (93320);  Surgeon: Sherman Dinero MD;  Location: [facility] MAIN OR;  Service: Pain Management     EPIDURAL BLOCK INJECTION N/A 03/12/2020    Procedure: L4 L5 Lumbar Epidural Steroid Injection (54445);  Surgeon: Sherman Dinero MD;  Location: [facility] MAIN OR;  Service: Pain Management     EPIDURAL BLOCK INJECTION N/A 07/23/2020    Procedure: L4-L5 LUMBAR EPIDURAL STEROID INJECTION (#2) (98111);  Surgeon: Sherman Dinero MD;  Location: [facility] MAIN OR;  Service: Pain Management     HERNIA MESH REMOVAL  06/22/2022    hernia repair, mesh removal reconstruction at [facility] Dr [facility]    " HERNIA REPAIR  2016    x3    SC XCAPSL CTRC RMVL INSJ IO LENS PROSTH W/O ECP Right 2022    Procedure: EXTRACTION EXTRACAPSULAR CATARACT PHACO INTRAOCULAR LENS (IOL);  Surgeon: Cristian Ly MD;  Location: [facility] MAIN OR;  Service: Ophthalmology    SC XCAPSL CTRC RMVL INSJ IO LENS PROSTH W/O ECP Left 10/24/2022    Procedure: EXTRACTION EXTRACAPSULAR CATARACT PHACO INTRAOCULAR LENS (IOL);  Surgeon: Cristian Ly MD;  Location: [facility] MAIN OR;  Service: Ophthalmology    TUBAL LIGATION        Family History   Problem Relation Age of Onset    Heart disease Mother     Dementia Father     No Known Problems Sister     Diabetes type II Brother     No Known Problems Maternal Aunt     No Known Problems Maternal Uncle     No Known Problems Paternal Aunt     No Known Problems Paternal Uncle     No Known Problems Maternal Grandmother     No Known Problems Maternal Grandfather     No Known Problems Paternal Grandmother     No Known Problems Paternal Grandfather      Social History     Tobacco Use    Smoking status: Former     Current packs/day: 0.00     Types: Cigarettes     Quit date: 1976     Years since quittin.5    Smokeless tobacco: Never   Substance Use Topics    Alcohol use: No     Allergies   Allergen Reactions    Metformin Vomiting     N/V, dizziness         Current Outpatient Medications:     aspirin (ECOTRIN LOW STRENGTH) 81 mg EC tablet, 81 mg daily at bedtime, Disp: , Rfl:     atorvastatin (LIPITOR) 40 mg tablet, TAKE 1 TABLET (40 MG TOTAL) BY MOUTH DAILY, Disp: 90 tablet, Rfl: 1    b complex vitamins capsule, Take 1 capsule by mouth daily, Disp: , Rfl:     Coenzyme Q10 (Co Q 10) 100 MG CAPS, Take by mouth daily with lunch, Disp: , Rfl:     hydrOXYzine HCL (ATARAX) 25 mg tablet, , Disp: , Rfl:     liraglutide (Victoza) injection, INJECT 0.1 ML (0.6 MG TOTAL) UNDER THE SKIN DAILY AFTER BREAKFAST, Disp: 9 mL, Rfl: 1    metoprolol succinate (TOPROL-XL) 50 mg 24 hr tablet, Take 1 tablet (50 mg total) by  mouth daily at bedtime, Disp: 90 tablet, Rfl: 0    multivitamin (THERAGRAN) TABS, Take 1 tablet by mouth daily, Disp: , Rfl:     omeprazole (PriLOSEC) 40 MG capsule, Take 1 capsule (40 mg total) by mouth every morning, Disp: 90 capsule, Rfl: 0    TURMERIC PO, Take by mouth in the morning, Disp: , Rfl:     VITAMIN D PO, Take by mouth in the morning, Disp: , Rfl:     ammonium lactate (LAC-HYDRIN) 12 % cream, Apply topically as needed for dry skin (Patient not taking: Reported on 9/20/2023), Disp: 385 g, Rfl: 0    amoxicillin-clavulanate (AUGMENTIN) 875-125 mg per tablet, , Disp: , Rfl:     ascorbic Acid (VITAMIN C) 500 MG CPCR, Take 1 capsule (500 mg total) by mouth 2 (two) times a day (Patient not taking: Reported on 1/4/2024), Disp: 60 capsule, Rfl: 0    azelastine (ASTELIN) 0.1 % nasal spray, 1 spray into each nostril 2 (two) times a day as needed for rhinitis Use in each nostril as directed, Disp: , Rfl:     GEE SEED PO, Take by mouth in the morning (Patient not taking: Reported on 1/4/2024), Disp: , Rfl:     cholecalciferol (VITAMIN D3) 1,000 units tablet, Take 1 tablet (1,000 Units total) by mouth daily, Disp: 30 tablet, Rfl: 0    ciprofloxacin (CILOXAN) 0.3 % ophthalmic solution, Administer 1 drop to the right eye 4 (four) times a day (Patient not taking: Reported on 9/20/2023), Disp: 5 mL, Rfl: 0    COMFORT EZ PEN NEEDLES 31G X 6 MM MISC, daily , Disp: , Rfl:     cyclobenzaprine (FLEXERIL) 10 mg tablet, , Disp: , Rfl:     ferrous sulfate 324 (65 Fe) mg, Take 1 tablet (324 mg total) by mouth daily before breakfast (Patient not taking: Reported on 5/2/2023), Disp: 30 tablet, Rfl: 0    Flaxseed, Linseed, (FLAX SEEDS PO), Take by mouth in the morning (Patient not taking: Reported on 8/2/2023), Disp: , Rfl:     folic acid (FOLVITE) 1 mg tablet, Take 1 tablet (1 mg total) by mouth daily (Patient not taking: Reported on 5/2/2023), Disp: 30 tablet, Rfl: 0    glucose blood (ONE TOUCH ULTRA TEST) test strip, Use 1 each  "in the morning for 90 doses Check sugar once daily, Disp: 90 each, Rfl: 0    ketorolac (ACULAR) 0.5 % ophthalmic solution, Administer 1 drop to the right eye 4 (four) times a day (Patient not taking: Reported on 8/2/2023), Disp: 5 mL, Rfl: 0    latanoprost (XALATAN) 0.005 % ophthalmic solution, Administer to both eyes daily at bedtime (Patient not taking: Reported on 8/2/2023), Disp: , Rfl:     montelukast (SINGULAIR) 10 mg tablet, 10 mg every morning, Disp: , Rfl:     polyethylene glycol (GOLYTELY) 4000 mL solution, Take 4,000 mL by mouth once for 1 dose, Disp: 4000 mL, Rfl: 0    zinc sulfate (ZINCATE) 220 mg capsule, Take 1 capsule (220 mg total) by mouth daily (Patient not taking: Reported on 5/2/2023), Disp: 30 capsule, Rfl: 0    Review of Systems  See HPI.   All other systems reviewed and are negative.      Physical Exam:  Body mass index is 34.33 kg/m².  /76 (BP Location: Left arm, Patient Position: Sitting, Cuff Size: Standard)   Pulse 78   Ht 5' 4\" (1.626 m)   Wt 90.7 kg (200 lb)   SpO2 98%   BMI 34.33 kg/m²    Wt Readings from Last 3 Encounters:   01/04/24 90.7 kg (200 lb)   09/20/23 94.3 kg (208 lb)   08/02/23 93.9 kg (207 lb)     Physical Exam  Vitals reviewed.   Constitutional:       Appearance: Normal appearance.   Cardiovascular:      Rate and Rhythm: Normal rate and regular rhythm.      Pulses: Normal pulses.      Heart sounds: Normal heart sounds.   Pulmonary:      Effort: Pulmonary effort is normal.      Breath sounds: Normal breath sounds.   Skin:     General: Skin is warm and dry.      Capillary Refill: Capillary refill takes less than 2 seconds.   Neurological:      General: No focal deficit present.      Mental Status: She is alert and oriented to person, place, and time.   Psychiatric:         Mood and Affect: Mood normal.         Behavior: Behavior normal.     Labs:   Lab Results   Component Value Date    HGBA1C 5.5 05/02/2023    HGBA1C 5.4 11/30/2021     Lab Results   Component " "Value Date    CREATININE 0.64 02/16/2021    CREATININE 0.87 01/01/2021    CREATININE 0.60 06/03/2016    BUN 22 02/16/2021     10/18/2013    K 4.2 02/16/2021     (H) 02/16/2021    CO2 28 02/16/2021     eGFR   Date Value Ref Range Status   02/16/2021 85 ml/min/1.73sq m Final     Lab Results   Component Value Date    HDL 47 04/04/2016    TRIG 132 04/04/2016     Lab Results   Component Value Date    ALT 24 01/01/2021    AST 25 01/01/2021    ALKPHOS 91 01/01/2021    BILITOT 0.6 10/18/2013     Lab Results   Component Value Date    HOQ4VFPORUNN 2.441 04/04/2016    DFR6BDOGHBTS 2.07 10/18/2013     No results found for: \"FREET4\", \"TSI\"    Discussed with the patient and all questioned fully answered. She will call me if any problems arise.    Follow-up appointment in 3 months.     Counseled patient on diagnostic results, prognosis, risk and benefit of treatment options, instruction for management, importance of treatment compliance, Risk  factor reduction and impressions    JOSSE Sterling    "

## 2024-01-04 NOTE — ASSESSMENT & PLAN NOTE
Lab Results   Component Value Date    HGBA1C 5.5 05/02/2023     HGA1C checked recently by PCP. Our office will reach out to get records. No glucose logs for review requested patient check 1-2 times per day at alternating times and send in for review in 1-2 weeks. Patient has been taking victoza 0.6 mg twice daily, discussed could take 1.2 mg daily and will review any necessary adjustments upon review of glucose levels.     Discussed risks/complications associated with uncontrolled diabetes including organ involvement, heart attack, stroke, death.  Advised lifestyle modifications including attention to diet including the amount and types of carbohydrates consumed and regular activity.     Call for blood sugars less than 70 mg/dl or patterns over 250 mg/dl.     Discussed symptoms and treatment of hypoglycemia.  Reviewed risks associated with hypoglycemia. Always carry rapid acting carbohydrates and a glucometer (a way to check your blood sugar).    Recommendation for medical identification either bracelet, necklace.    Recommendation for glucagon if on insulin.     Routine follow up for diabetic eye and foot exams.     Ordered blood work to complete prior to next visit.    Send glucose logs/CGM download in 1-2 weeks for review    Follow up in 3 months.

## 2024-02-02 DIAGNOSIS — E11.00 TYPE 2 DIABETES MELLITUS WITH HYPEROSMOLARITY WITHOUT COMA, WITHOUT LONG-TERM CURRENT USE OF INSULIN (HCC): ICD-10-CM

## 2024-02-02 RX ORDER — PERPHENAZINE 16 MG/1
TABLET, FILM COATED ORAL
Qty: 100 STRIP | Refills: 3 | Status: SHIPPED | OUTPATIENT
Start: 2024-02-02

## 2024-03-02 NOTE — OP NOTE
OPERATIVE REPORT    PATIENT NAME: Ag Hernández    :  1940  MRN: [id number]  Pt Location: [facility] OR ROOM 01    Surgery Date: 2022    Surgeon(s) and Role:     * Pamela Mason MD - Primary    Age-related nuclear cataract, right eye [H25 11]    Post-Op Diagnosis Codes:     * Age-related nuclear cataract, right eye [H25 11]    Procedure(s):  EXTRACTION EXTRACAPSULAR CATARACT PHACO INTRAOCULAR LENS (IOL)    Anesthesia Type:   IV Sedation with Anesthesia    Operative Indications:  Age-related nuclear cataract, right eye [H25 11]  Decreased vision to 20/40  With problems driving  Pt requested cataract sx the right eye    Procedure and Technique:    Procedure Details     The patient was brought in the OR in stable condition and placed on the operative table  The right eye was prepped and draped in the usual sterile manner  Attention was directed to the right eye where a lid speculum was placed  A 2 4 mm clear corneal incision was made temperally  1/2 cc of 1% MPF Lidocaine was irrigated into the anterior chamber followed by viscoat  The side port incision was placed superiorly  The capsularrhexis was made and the nucleus was hydrodissected with BSS  The nucleus was then removed with the phaco handpiece followed by removal of the cortical material with the I/A handpiece  The capsular bag was then filled with Provisc  The IOL was folded and placed in to the capsular bag and centered well  The remaining Provisc was removed from the eye with the I/A  The wounds were hydrated with BSS and found to be water tight  The lid speculum was removed and 2 drops of Gatifloxicin were placed over the cornea  A protective eye shield was taped over the eye and the patient went to PACU in stable condition  I will see the patient in the office tomorrow and the expected post op period is a few weeks         Complications: None        Disposition: PACU   Condition: Stable    SIGNATURE: Pamela Mason MD  DATE:  2022  TIME: 10:04 AM Statement Selected

## 2024-03-07 ENCOUNTER — OFFICE VISIT (OUTPATIENT)
Dept: CARDIOLOGY CLINIC | Facility: CLINIC | Age: 84
End: 2024-03-07
Payer: COMMERCIAL

## 2024-03-07 VITALS
BODY MASS INDEX: 34.31 KG/M2 | OXYGEN SATURATION: 96 % | HEART RATE: 77 BPM | DIASTOLIC BLOOD PRESSURE: 72 MMHG | WEIGHT: 201 LBS | HEIGHT: 64 IN | SYSTOLIC BLOOD PRESSURE: 112 MMHG

## 2024-03-07 DIAGNOSIS — I10 BENIGN ESSENTIAL HYPERTENSION: Primary | ICD-10-CM

## 2024-03-07 DIAGNOSIS — E78.5 DYSLIPIDEMIA: ICD-10-CM

## 2024-03-07 DIAGNOSIS — I49.9 IRREGULAR HEART BEAT: ICD-10-CM

## 2024-03-07 DIAGNOSIS — R00.2 PALPITATIONS: ICD-10-CM

## 2024-03-07 PROCEDURE — 99214 OFFICE O/P EST MOD 30 MIN: CPT | Performed by: INTERNAL MEDICINE

## 2024-03-07 NOTE — PROGRESS NOTES
Cardiology   Nicole Pride DO, [facility]  Warren Clark MD, [facility]  Dmitri Brooks MD, [facility]  Lissette Galindo MD, [facility]  -------------------------------------------------------------------  [facility]  [address], [address], [address]  [place], [place], [address]  [phone]    Cardiology Follow Up  Keo Summers  1940  [id number]          Assessment/Plan:    1. Benign essential hypertension    2. Dyslipidemia    3. Palpitations    4. Irregular heart beat      - Patient with intermittent palpitations - There was a 3% PVC burden during her last.  - Will continue metoprolol 50 mg daily.     - continue atorvastatin   - Has blood work upcoming with her PCP   - Encouraged to ambulate.         Interval History:     Keo Summers is 83 y.o. female here for followup of palpitations.    Since her last visit, she has been feeling well.  she denies any chest pain, shortness of breath, LE edema, orthopnea or PND.   She has rare episodes of palpitations.    Diet is overall unchanged.  She has lost 7 pounds since her last visit.      She had a stress test and echocardiogram in 2022 which were normal.  Previous 2 week event monitor showed a 3.9% PVC burden.  She had symptoms during monitoring which corresponded to PVCs.  She had occasional SVT episodes as well.  Current medication regimen includes Toprol XL 50 mg daily.  Previously, she was using diltiazem.   Holter monitor was done on March 10, 2022 which showed frequent PVCs which totaled 16.8% of all monitored beats without any episodes of ventricular tachycardia.  There were 4 episodes of supraventricular tachycardia with the longest lasting for 5 beats.  Patient also had a stress test done which showed an EF of 71% with no ischemia or infarction noted.  A 2D echocardiogram showed ejection fraction of 55% with no valve disease.  TSH was normal.        Past Medical History:   Diagnosis Date    Arthritis     DJD- left  "thumb DJD,most joints    Borderline diabetes     Change in bowel habits     Chronic pain disorder     lumbar    Colon polyp     Constipation     unable to expell the stool completely with BM    COVID-19     in the winter    Deviated septum     Diabetes mellitus (HCC)     Diverticulosis     diverticulitis- with resection-colostomy then reversed    DJD (degenerative joint disease)     pt does get injections to the knee with Dr. Collazo    GERD (gastroesophageal reflux disease)     Glaucoma     Hearing aid worn     bilateral ears for background noise    Hyperlipidemia     Irregular heart beat     \"skips a beat\"    Low back pain     Lumbar degenerative disc disease 2019    Muscle spasm     legs    Peripheral neuropathy     PND (post-nasal drip)     Presence of upper and lower permanent dental bridges     Sciatica     Spinal stenosis     Wears glasses      Social History     Socioeconomic History    Marital status:      Spouse name: Not on file    Number of children: Not on file    Years of education: Not on file    Highest education level: Not on file   Occupational History    Not on file   Tobacco Use    Smoking status: Former     Current packs/day: 0.00     Types: Cigarettes     Quit date: 1976     Years since quittin.6    Smokeless tobacco: Never   Vaping Use    Vaping status: Never Used   Substance and Sexual Activity    Alcohol use: No    Drug use: Not Currently     Types: Marijuana     Comment: stopped in     Sexual activity: Not on file   Other Topics Concern    Not on file   Social History Narrative    Not on file     Social Determinants of Health     Financial Resource Strain: Not on file   Food Insecurity: Not on file   Transportation Needs: Not on file   Physical Activity: Not on file   Stress: Not on file   Social Connections: Not on file   Intimate Partner Violence: Not on file   Housing Stability: Not on file      Family History   Problem Relation Age of Onset    Heart " "disease Mother     Dementia Father     No Known Problems Sister     Diabetes type II Brother     No Known Problems Maternal Aunt     No Known Problems Maternal Uncle     No Known Problems Paternal Aunt     No Known Problems Paternal Uncle     No Known Problems Maternal Grandmother     No Known Problems Maternal Grandfather     No Known Problems Paternal Grandmother     No Known Problems Paternal Grandfather      Past Surgical History:   Procedure Laterality Date    BREAST SURGERY  2005    bilateral reduction    COLON SURGERY  2014    resection with colostomy-ruptured \"intestine\"    COLONOSCOPY N/A 07/11/2016    Procedure: COLONOSCOPY;  Surgeon: Sunny Wyman MD;  Location: [facility];  Service:     COLOSTOMY CLOSURE  2015    COMBINED REDUCTION MAMMAPLASTY W/ LIPOSUCTION      EPIDURAL BLOCK INJECTION N/A 11/15/2019    Procedure: L4 L5 Lumbar Epidural Steroid Injection (17127);  Surgeon: Sherman Dinero MD;  Location: [facility] MA[facility] OR;  Service: Pain Management     EPIDURAL BLOCK INJECTION N/A 03/12/2020    Procedure: L4 L5 Lumbar Epidural Steroid Injection (37014);  Surgeon: Sherman Dinero MD;  Location: [facility] MA[facility] OR;  Service: Pain Management     EPIDURAL BLOCK INJECTION N/A 07/23/2020    Procedure: L4-L5 LUMBAR EPIDURAL STEROID INJECTION (#2) (88266);  Surgeon: Sherman Dinero MD;  Location: [facility] MA[facility] OR;  Service: Pain Management     HERNIA MESH REMOVAL  06/22/2022    hernia repair, mesh removal reconstruction at [facility]    HERNIA REPAIR  2016    x3    FL XCAPSL CTRC RMVL INSJ IO LENS PROSTH W/O ECP Right 08/22/2022    Procedure: EXTRACTION EXTRACAPSULAR CATARACT PHACO INTRAOCULAR LENS (IOL);  Surgeon: Cristian Ly MD;  Location: [facility] MA[facility] OR;  Service: Ophthalmology    FL XCAPSL CTR RMVL INSJ IO LENS PROSTH W/O ECP Left 10/24/2022    Procedure: EXTRACTION EXTRACAPSULAR CATARACT PHACO INTRAOCULAR LENS (IOL);  Surgeon: Cristian Ly MD;  Location: [facility] MA[facility] OR;  Service: Ophthalmology    TUBAL " LIGATION         Current Outpatient Medications:     amoxicillin-clavulanate (AUGMENTIN) 875-125 mg per tablet, , Disp: , Rfl:     aspirin (ECOTRIN LOW STRENGTH) 81 mg EC tablet, 81 mg daily at bedtime, Disp: , Rfl:     atorvastatin (LIPITOR) 40 mg tablet, TAKE 1 TABLET (40 MG TOTAL) BY MOUTH DAILY, Disp: 90 tablet, Rfl: 1    azelastine (ASTELIN) 0.1 % nasal spray, 1 spray into each nostril 2 (two) times a day as needed for rhinitis Use in each nostril as directed, Disp: , Rfl:     cholecalciferol (VITAMIN D3) 1,000 units tablet, Take 1 tablet (1,000 Units total) by mouth daily, Disp: 30 tablet, Rfl: 0    Coenzyme Q10 (Co Q 10) 100 MG CAPS, Take by mouth daily with lunch, Disp: , Rfl:     COMFORT EZ PEN NEEDLES 31G X 6 MM MISC, daily , Disp: , Rfl:     cyclobenzaprine (FLEXERIL) 10 mg tablet, , Disp: , Rfl:     glucose blood (Contour Next Test) test strip, USE 1 EACH IN THE MORNING FOR 90 DOSES CHECK SUGAR ONCE DAILY, Disp: 100 strip, Rfl: 3    hydrOXYzine HCL (ATARAX) 25 mg tablet, , Disp: , Rfl:     liraglutide (Victoza) injection, INJECT 0.1 ML (0.6 MG TOTAL) UNDER THE SKIN DAILY AFTER BREAKFAST, Disp: 9 mL, Rfl: 1    metoprolol succinate (TOPROL-XL) 50 mg 24 hr tablet, Take 1 tablet (50 mg total) by mouth daily at bedtime, Disp: 90 tablet, Rfl: 0    multivitamin (THERAGRAN) TABS, Take 1 tablet by mouth daily, Disp: , Rfl:     omeprazole (PriLOSEC) 40 MG capsule, Take 1 capsule (40 mg total) by mouth every morning, Disp: 90 capsule, Rfl: 0    TURMERIC PO, Take by mouth in the morning, Disp: , Rfl:     VITAMIN D PO, Take by mouth in the morning, Disp: , Rfl:     ascorbic Acid (VITAMIN C) 500 MG CPCR, Take 1 capsule (500 mg total) by mouth 2 (two) times a day (Patient not taking: Reported on 1/4/2024), Disp: 60 capsule, Rfl: 0    b complex vitamins capsule, Take 1 capsule by mouth daily, Disp: , Rfl:     GEE SEED PO, Take by mouth in the morning (Patient not taking: Reported on 1/4/2024), Disp: , Rfl:     ferrous  "sulfate 324 (65 Fe) mg, Take 1 tablet (324 mg total) by mouth daily before breakfast (Patient not taking: Reported on 5/2/2023), Disp: 30 tablet, Rfl: 0    Flaxseed, Linseed, (FLAX SEEDS PO), Take by mouth in the morning (Patient not taking: Reported on 8/2/2023), Disp: , Rfl:     folic acid (FOLVITE) 1 mg tablet, Take 1 tablet (1 mg total) by mouth daily (Patient not taking: Reported on 5/2/2023), Disp: 30 tablet, Rfl: 0    latanoprost (XALATAN) 0.005 % ophthalmic solution, Administer to both eyes daily at bedtime (Patient not taking: Reported on 8/2/2023), Disp: , Rfl:     montelukast (SINGULAIR) 10 mg tablet, 10 mg every morning (Patient not taking: Reported on 3/7/2024), Disp: , Rfl:     zinc sulfate (ZINCATE) 220 mg capsule, Take 1 capsule (220 mg total) by mouth daily (Patient not taking: Reported on 5/2/2023), Disp: 30 capsule, Rfl: 0        Review of Systems:  Review of Systems   Constitutional:  Positive for fatigue.   Cardiovascular:  Positive for palpitations. Negative for chest pain and leg swelling.   Musculoskeletal:  Positive for arthralgias.   Neurological:  Positive for headaches.   Psychiatric/Behavioral:  The patient is nervous/anxious.    All other systems reviewed and are negative.        Physical Exam:  Vitals:  Vitals:    03/07/24 1419   BP: 112/72   BP Location: Right arm   Patient Position: Sitting   Cuff Size: Large   Pulse: 77   SpO2: 96%   Weight: 91.2 kg (201 lb)   Height: 5' 4\" (1.626 m)     Physical Exam   Constitutional: She appears healthy. No distress.   Eyes: Pupils are equal, round, and reactive to light. Conjunctivae are normal.   Neck: No JVD present.   Cardiovascular: Normal rate, regular rhythm and normal heart sounds. Exam reveals no gallop and no friction rub.   No murmur heard.  Pulmonary/Chest: Effort normal and breath sounds normal. She has no wheezes. She has no rales.   Musculoskeletal:         General: No tenderness, deformity or edema.      Cervical back: Normal range " of motion and neck supple.   Neurological: She is alert and oriented to person, place, and time.   Skin: Skin is warm and dry.        Cardiographics:  EKG: Personally reviewed NSR   Last known EF: 55%    This note was completed in part utilizing M-Modal Fluency Direct Software.  Grammatical errors, random word insertions, spelling mistakes, and incomplete sentences can be an occasional consequence of this system secondary to software limitations, ambient noise, and hardware issues.  If you have any questions or concerns about the content, text, or information contained within the body of this dictation, please contact the provider for clarification.

## 2024-03-13 ENCOUNTER — TELEPHONE (OUTPATIENT)
Age: 84
End: 2024-03-13

## 2024-03-13 NOTE — TELEPHONE ENCOUNTER
Attempted to call patient and LVM on machine.     When patient calls back please tell her that I did get ahold of the office and she can keep her apt as schedule for 3/15/24 with  at 2:15 pm.

## 2024-03-15 NOTE — TELEPHONE ENCOUNTER
Pt called and stated she has a headache from not sleeping the night before and just r/s appt but would like a call back in regards to if appt will be more than just a consult.    Pt call back-[phone]

## 2024-03-15 NOTE — TELEPHONE ENCOUNTER
Pt called and asked if her appt today will be an examination or just a discussion appt due to never being seen in our practice. Pt stated that she is having a severe incontinence issue going on and would like to be examined and if not she will r/s for an appt that she will have an exam at the appt    Pt call back-[phone]

## 2024-03-16 NOTE — TELEPHONE ENCOUNTER
Advised MA to tell patient most likely would be just a discussion the first time.  Maybe a urine test or bladder scan but no scope or pelvis exam.

## 2024-03-28 DIAGNOSIS — I10 PRIMARY HYPERTENSION: ICD-10-CM

## 2024-03-28 DIAGNOSIS — E11.00 TYPE 2 DIABETES MELLITUS WITH HYPEROSMOLARITY WITHOUT COMA, WITHOUT LONG-TERM CURRENT USE OF INSULIN (HCC): ICD-10-CM

## 2024-03-28 DIAGNOSIS — I49.9 IRREGULAR HEART BEAT: ICD-10-CM

## 2024-03-28 RX ORDER — METOPROLOL SUCCINATE 50 MG/1
50 TABLET, EXTENDED RELEASE ORAL
Qty: 90 TABLET | Refills: 0 | Status: SHIPPED | OUTPATIENT
Start: 2024-03-28 | End: 2025-03-23

## 2024-03-28 RX ORDER — LIRAGLUTIDE 6 MG/ML
INJECTION SUBCUTANEOUS
Qty: 9 ML | Refills: 0 | Status: SHIPPED | OUTPATIENT
Start: 2024-03-28

## 2024-04-04 ENCOUNTER — OFFICE VISIT (OUTPATIENT)
Dept: ENDOCRINOLOGY | Facility: CLINIC | Age: 84
End: 2024-04-04
Payer: COMMERCIAL

## 2024-04-04 VITALS
SYSTOLIC BLOOD PRESSURE: 136 MMHG | DIASTOLIC BLOOD PRESSURE: 80 MMHG | HEIGHT: 64 IN | WEIGHT: 204 LBS | HEART RATE: 78 BPM | BODY MASS INDEX: 34.83 KG/M2 | OXYGEN SATURATION: 98 %

## 2024-04-04 DIAGNOSIS — I10 BENIGN ESSENTIAL HYPERTENSION: ICD-10-CM

## 2024-04-04 DIAGNOSIS — Z78.0 POSTMENOPAUSAL ESTROGEN DEFICIENCY: ICD-10-CM

## 2024-04-04 DIAGNOSIS — E55.9 VITAMIN D INSUFFICIENCY: ICD-10-CM

## 2024-04-04 DIAGNOSIS — E11.00 TYPE 2 DIABETES MELLITUS WITH HYPEROSMOLARITY WITHOUT COMA, WITHOUT LONG-TERM CURRENT USE OF INSULIN (HCC): Primary | ICD-10-CM

## 2024-04-04 DIAGNOSIS — R42 DIZZINESS: ICD-10-CM

## 2024-04-04 DIAGNOSIS — E11.42 TYPE 2 DIABETES MELLITUS WITH DIABETIC POLYNEUROPATHY, WITHOUT LONG-TERM CURRENT USE OF INSULIN (HCC): ICD-10-CM

## 2024-04-04 LAB — SL AMB POCT HEMOGLOBIN AIC: 5.3 (ref ?–6.5)

## 2024-04-04 PROCEDURE — 83036 HEMOGLOBIN GLYCOSYLATED A1C: CPT | Performed by: NURSE PRACTITIONER

## 2024-04-04 PROCEDURE — 99214 OFFICE O/P EST MOD 30 MIN: CPT | Performed by: NURSE PRACTITIONER

## 2024-04-04 NOTE — ASSESSMENT & PLAN NOTE
Lab Results   Component Value Date    HGBA1C 5.5 05/02/2023     HGA1C close to goal.  Discussed diagnostic CGM as she is very concerned she is having blood sugar spikes.     BGL Reviewed: Check blood sugars 1-2 times per day at alternating times.     Treatment regimen: Continues on victoza 0.6 mg daily.     Discussed risks/complications associated with uncontrolled diabetes including organ involvement, heart attack, stroke, death.    Advised lifestyle modifications including attention to diet including the amount and types of carbohydrates consumed and regular activity.     Call for blood sugars less than 70 mg/dl or patterns over 250 mg/dl.     Discussed symptoms and treatment of hypoglycemia.  Reviewed risks associated with hypoglycemia. Always carry rapid acting carbohydrates and a glucometer (a way to check your blood sugar).    Recommendation for medical identification either bracelet, necklace.    Routine follow up for diabetic eye and foot exams.     Ordered blood work to complete prior to next visit.    Send glucose logs/CGM download in 1-2 weeks for review    Follow up in 3 months.

## 2024-04-04 NOTE — PROGRESS NOTES
Established Patient Progress Note    Chief Complaint:  Diabetes follow up visit    Impression & Plan:    Problem List Items Addressed This Visit          Cardiovascular and Mediastinum    Benign essential hypertension     BP slightly elevated on exam.             Endocrine    Type 2 diabetes mellitus with diabetic polyneuropathy, without long-term current use of insulin ([facility])       Lab Results   Component Value Date    HGBA1C 5.5 05/02/2023     HGA1C close to goal.  Discussed diagnostic CGM as she is very concerned she is having blood sugar spikes.     BGL Reviewed: Check blood sugars 1-2 times per day at alternating times.     Treatment regimen: Continues on victoza 0.6 mg daily.     Discussed risks/complications associated with uncontrolled diabetes including organ involvement, heart attack, stroke, death.    Advised lifestyle modifications including attention to diet including the amount and types of carbohydrates consumed and regular activity.     Call for blood sugars less than 70 mg/dl or patterns over 250 mg/dl.     Discussed symptoms and treatment of hypoglycemia.  Reviewed risks associated with hypoglycemia. Always carry rapid acting carbohydrates and a glucometer (a way to check your blood sugar).    Recommendation for medical identification either bracelet, necklace.    Routine follow up for diabetic eye and foot exams.     Ordered blood work to complete prior to next visit.    Send glucose logs/CGM download in 1-2 weeks for review    Follow up in 3 months.               Other    Vitamin D insufficiency     Continues vitamin D supplementation.            Relevant Orders    Vitamin D 25 hydroxy     Other Visit Diagnoses       Type 2 diabetes mellitus with hyperosmolarity without coma, without long-term current use of insulin ([facility])    -  Primary    Relevant Orders    POCT hemoglobin A1c (Completed)    Ambulatory referral to Ophthalmology    Ambulatory referral to Podiatry    TSH, 3rd generation    T4,  free    Albumin / creatinine urine ratio    Lipid panel    Comprehensive metabolic panel    Postmenopausal estrogen deficiency        Relevant Orders    DXA bone density spine hip and pelvis    Dizziness        Relevant Orders    Cortisol Level, AM Specimen            History of Present Illness:   Keo Summers is a 83 y.o. female with a history of type 2 diabetes without long term use of insulin. Reports complications of  neuropathy. History of GERD, hypertension, and obesity. Recently started on therapy for anxiety. Denies recent illness or hospitalizations. Denies recent severe hypoglycemic or severe hyperglycemic episodes. Denies any issues with her current regimen. Home glucose monitoring: are performed regularly with highest glucose level 150 mg/dL. Last office appointment in January 2024.      Current regimen:  Victoza 0.6 mg (reduced from last visit)      Off of metformin, did not tolerate     Last Eye Exam: UTD, no history of DR. Previous history of cataract surgery.   Last Foot Exam: UTD from May 2023     Has hypertension: Taking metoprolol  Has hyperlipidemia: Taking atorvastatin 40 mg daily            No history of thyroid disease  No history of pancreatitis     Having dizziness. No double/blurry vision. No nausea or vomiting.      Taking vitamin D3 1000 IU daily (unsure if 1-2000)       Patient Active Problem List   Diagnosis    Chronic pain syndrome    Chronic bilateral low back pain with bilateral sciatica    Spinal stenosis of lumbar region with neurogenic claudication    Lumbar radiculopathy    Lumbar degenerative disc disease    Low back pain    Benign essential hypertension    GERD (gastroesophageal reflux disease)    Palpitation    COVID-19    Colon polyps    Type 2 diabetes mellitus with diabetic polyneuropathy, without long-term current use of insulin ([facility])    Cervical radiculopathy    Vitamin D insufficiency    Urinary incontinence    Persistent insomnia of non-organic origin    Memory  "difficulty    Glaucoma    Dyspnea on exertion    Diverticulitis of intestine with perforation    Benign paroxysmal positional vertigo    Anxiety    Allergic rhinitis    Increased frequency of urination    Obesity, morbid (HCC)    Numbness and tingling    Weight gain      Past Medical History:   Diagnosis Date    Arthritis     DJD- left thumb DJD,most joints    Borderline diabetes     Change in bowel habits     Chronic pain disorder     lumbar    Colon polyp     Constipation     unable to expell the stool completely with BM    COVID-19 2020    in the winter    Deviated septum     Diabetes mellitus (HCC)     Diverticulosis     diverticulitis- with resection-colostomy then reversed    DJD (degenerative joint disease)     pt does get injections to the knee with Dr. Collazo    GERD (gastroesophageal reflux disease)     Glaucoma     Hearing aid worn     bilateral ears for background noise    Hyperlipidemia     Irregular heart beat     \"skips a beat\"    Low back pain     Lumbar degenerative disc disease 09/16/2019    Muscle spasm     legs    Peripheral neuropathy     PND (post-nasal drip)     Presence of upper and lower permanent dental bridges     Sciatica     Spinal stenosis     Wears glasses       Past Surgical History:   Procedure Laterality Date    BREAST SURGERY  2005    bilateral reduction    COLON SURGERY  2014    resection with colostomy-ruptured \"intestine\"    COLONOSCOPY N/A 07/11/2016    Procedure: COLONOSCOPY;  Surgeon: Sunny Wyman MD;  Location: [facility] GI LAB;  Service:     COLOSTOMY CLOSURE  2015    COMBINED REDUCTION MAMMAPLASTY W/ LIPOSUCTION      EPIDURAL BLOCK INJECTION N/A 11/15/2019    Procedure: L4 L5 Lumbar Epidural Steroid Injection (86890);  Surgeon: Sherman Dinero MD;  Location: [facility] MAIN OR;  Service: Pain Management     EPIDURAL BLOCK INJECTION N/A 03/12/2020    Procedure: L4 L5 Lumbar Epidural Steroid Injection (71698);  Surgeon: Sherman Dinero MD;  Location: [facility] MAIN OR;  Service: Pain " Management     EPIDURAL BLOCK INJECTION N/A 2020    Procedure: L4-L5 LUMBAR EPIDURAL STEROID INJECTION (#2) (81146);  Surgeon: Sherman Dinero MD;  Location: [facility] MAIN OR;  Service: Pain Management     HERNIA MESH REMOVAL  2022    hernia repair, mesh removal reconstruction [place] [facility]    HERNIA REPAIR  2016    x3    MD XCAPSL CTRC RMVL INSJ IO LENS PROSTH W/O ECP Right 2022    Procedure: EXTRACTION EXTRACAPSULAR CATARACT PHACO INTRAOCULAR LENS (IOL);  Surgeon: Cristian Ly MD;  Location: [facility] MAIN OR;  Service: Ophthalmology    MD XCAPSL CTRC RMVL INSJ IO LENS PROSTH W/O ECP Left 10/24/2022    Procedure: EXTRACTION EXTRACAPSULAR CATARACT PHACO INTRAOCULAR LENS (IOL);  Surgeon: Cristian Ly MD;  Location: [facility] MAIN OR;  Service: Ophthalmology    TUBAL LIGATION        Family History   Problem Relation Age of Onset    Heart disease Mother     Dementia Father     No Known Problems Sister     Diabetes type II Brother     No Known Problems Maternal Aunt     No Known Problems Maternal Uncle     No Known Problems Paternal Aunt     No Known Problems Paternal Uncle     No Known Problems Maternal Grandmother     No Known Problems Maternal Grandfather     No Known Problems Paternal Grandmother     No Known Problems Paternal Grandfather      Social History     Tobacco Use    Smoking status: Former     Current packs/day: 0.00     Types: Cigarettes     Quit date: 1976     Years since quittin.7    Smokeless tobacco: Never   Substance Use Topics    Alcohol use: No     Allergies   Allergen Reactions    Metformin Vomiting     N/V, dizziness         Current Outpatient Medications:     aspirin (ECOTRIN LOW STRENGTH) 81 mg EC tablet, 81 mg daily at bedtime, Disp: , Rfl:     atorvastatin (LIPITOR) 40 mg tablet, TAKE 1 TABLET (40 MG TOTAL) BY MOUTH DAILY, Disp: 90 tablet, Rfl: 1    b complex vitamins capsule, Take 1 capsule by mouth daily, Disp: , Rfl:     cholecalciferol (VITAMIN D3) 1,000 units  tablet, Take 1 tablet (1,000 Units total) by mouth daily, Disp: 30 tablet, Rfl: 0    Coenzyme Q10 (Co Q 10) 100 MG CAPS, Take by mouth daily with lunch, Disp: , Rfl:     COMFORT EZ PEN NEEDLES 31G X 6 MM MISC, daily , Disp: , Rfl:     glucose blood (Contour Next Test) test strip, USE 1 EACH IN THE MORNING FOR 90 DOSES CHECK SUGAR ONCE DAILY, Disp: 100 strip, Rfl: 3    hydrOXYzine HCL (ATARAX) 25 mg tablet, , Disp: , Rfl:     metoprolol succinate (TOPROL-XL) 50 mg 24 hr tablet, TAKE 1 TABLET (50 MG TOTAL) BY MOUTH DAILY AT BEDTIME, Disp: 90 tablet, Rfl: 0    multivitamin (THERAGRAN) TABS, Take 1 tablet by mouth daily, Disp: , Rfl:     omeprazole (PriLOSEC) 40 MG capsule, Take 1 capsule (40 mg total) by mouth every morning, Disp: 90 capsule, Rfl: 0    TURMERIC PO, Take by mouth in the morning, Disp: , Rfl:     Victoza injection, INJECT 0.1 ML (0.6 MG TOTAL) UNDER THE SKIN DAILY AFTER BREAKFAST, Disp: 9 mL, Rfl: 0    VITAMIN D PO, Take by mouth in the morning, Disp: , Rfl:     amoxicillin-clavulanate (AUGMENTIN) 875-125 mg per tablet, , Disp: , Rfl:     ascorbic Acid (VITAMIN C) 500 MG CPCR, Take 1 capsule (500 mg total) by mouth 2 (two) times a day (Patient not taking: Reported on 1/4/2024), Disp: 60 capsule, Rfl: 0    azelastine (ASTELIN) 0.1 % nasal spray, 1 spray into each nostril 2 (two) times a day as needed for rhinitis Use in each nostril as directed, Disp: , Rfl:     GEE SEED PO, Take by mouth in the morning (Patient not taking: Reported on 1/4/2024), Disp: , Rfl:     cyclobenzaprine (FLEXERIL) 10 mg tablet, , Disp: , Rfl:     ferrous sulfate 324 (65 Fe) mg, Take 1 tablet (324 mg total) by mouth daily before breakfast (Patient not taking: Reported on 5/2/2023), Disp: 30 tablet, Rfl: 0    Flaxseed, Linseed, (FLAX SEEDS PO), Take by mouth in the morning (Patient not taking: Reported on 8/2/2023), Disp: , Rfl:     folic acid (FOLVITE) 1 mg tablet, Take 1 tablet (1 mg total) by mouth daily (Patient not taking:  "Reported on 5/2/2023), Disp: 30 tablet, Rfl: 0    latanoprost (XALATAN) 0.005 % ophthalmic solution, Administer to both eyes daily at bedtime (Patient not taking: Reported on 8/2/2023), Disp: , Rfl:     montelukast (SINGULAIR) 10 mg tablet, 10 mg every morning (Patient not taking: Reported on 3/7/2024), Disp: , Rfl:     zinc sulfate (ZINCATE) 220 mg capsule, Take 1 capsule (220 mg total) by mouth daily (Patient not taking: Reported on 5/2/2023), Disp: 30 capsule, Rfl: 0    Review of Systems  See HPI.   All other systems reviewed and are negative.      Physical Exam:  Body mass index is 35.02 kg/m².  /80 (BP Location: Left arm, Patient Position: Sitting, Cuff Size: Large)   Pulse 78   Ht 5' 4\" (1.626 m)   Wt 92.5 kg (204 lb)   SpO2 98%   BMI 35.02 kg/m²    Wt Readings from Last 3 Encounters:   04/04/24 92.5 kg (204 lb)   03/07/24 91.2 kg (201 lb)   01/04/24 90.7 kg (200 lb)        Physical Exam  Vitals reviewed.   Constitutional:       Appearance: Normal appearance.   Cardiovascular:      Rate and Rhythm: Normal rate and regular rhythm.      Pulses: Normal pulses.      Heart sounds: Normal heart sounds.   Pulmonary:      Effort: Pulmonary effort is normal.      Breath sounds: Normal breath sounds.   Skin:     General: Skin is warm and dry.      Capillary Refill: Capillary refill takes less than 2 seconds.   Neurological:      General: No focal deficit present.      Mental Status: She is alert and oriented to person, place, and time.   Psychiatric:         Mood and Affect: Mood normal.         Behavior: Behavior normal.       Labs:   Lab Results   Component Value Date    HGBA1C 5.3 04/04/2024    HGBA1C 5.5 05/02/2023    HGBA1C 5.4 11/30/2021     Lab Results   Component Value Date    CREATININE 0.64 02/16/2021    CREATININE 0.87 01/01/2021    CREATININE 0.60 06/03/2016    BUN 22 02/16/2021     10/18/2013    K 4.2 02/16/2021     (H) 02/16/2021    CO2 28 02/16/2021     eGFR   Date Value Ref Range " "Status   02/16/2021 85 ml/min/1.73sq m Final     Lab Results   Component Value Date    HDL 47 04/04/2016    TRIG 132 04/04/2016     Lab Results   Component Value Date    ALT 24 01/01/2021    AST 25 01/01/2021    ALKPHOS 91 01/01/2021    BILITOT 0.6 10/18/2013     Lab Results   Component Value Date    FTA8CMSOWXXH 2.441 04/04/2016    QAI6EVTRSDUM 2.07 10/18/2013     No results found for: \"FREET4\", \"TSI\"    Discussed with the patient and all questioned fully answered. She will call me if any problems arise.    Follow-up appointment in 3 months.     Counseled patient on diagnostic results, prognosis, risk and benefit of treatment options, instruction for management, importance of treatment compliance, Risk  factor reduction and impressions    JOSSE Sterling    "

## 2024-05-02 DIAGNOSIS — I49.9 IRREGULAR HEART BEAT: ICD-10-CM

## 2024-05-02 DIAGNOSIS — I10 PRIMARY HYPERTENSION: ICD-10-CM

## 2024-05-02 DIAGNOSIS — E78.5 DYSLIPIDEMIA: ICD-10-CM

## 2024-05-02 RX ORDER — METOPROLOL SUCCINATE 50 MG/1
50 TABLET, EXTENDED RELEASE ORAL
Qty: 30 TABLET | Refills: 0 | Status: SHIPPED | OUTPATIENT
Start: 2024-05-02 | End: 2024-10-29

## 2024-05-02 RX ORDER — ATORVASTATIN CALCIUM 40 MG/1
40 TABLET, FILM COATED ORAL DAILY
Qty: 60 TABLET | Refills: 0 | Status: SHIPPED | OUTPATIENT
Start: 2024-05-02

## 2024-05-02 NOTE — TELEPHONE ENCOUNTER
Reason for call:   [x] Refill   [] Prior Auth  [x] Other: pt has absolutely no lipitor left and is also calling in advance on her toprol - she has about a month left.     Office:   [] PCP/Provider -   [x] Specialty/Provider - [facility] - Lou Almanza         Pharmacy: [facility]     Does the patient have enough for 3 days?   [] Yes -  [x] No - Send as HP to POD LIPITOR

## 2024-05-07 ENCOUNTER — CONSULT (OUTPATIENT)
Dept: UROLOGY | Facility: CLINIC | Age: 84
End: 2024-05-07
Payer: COMMERCIAL

## 2024-05-07 VITALS
HEIGHT: 64 IN | WEIGHT: 207 LBS | DIASTOLIC BLOOD PRESSURE: 80 MMHG | BODY MASS INDEX: 35.34 KG/M2 | SYSTOLIC BLOOD PRESSURE: 130 MMHG | OXYGEN SATURATION: 97 % | HEART RATE: 84 BPM

## 2024-05-07 DIAGNOSIS — N39.3 STRESS INCONTINENCE OF URINE: Primary | ICD-10-CM

## 2024-05-07 DIAGNOSIS — N39.41 URGE INCONTINENCE OF URINE: ICD-10-CM

## 2024-05-07 DIAGNOSIS — E66.9 OBESITY (BMI 30.0-34.9): ICD-10-CM

## 2024-05-07 PROBLEM — E66.811 OBESITY (BMI 30.0-34.9): Status: ACTIVE | Noted: 2024-05-07

## 2024-05-07 LAB
POST-VOID RESIDUAL VOLUME, ML POC: 45 ML
SL AMB  POCT GLUCOSE, UA: NORMAL
SL AMB LEUKOCYTE ESTERASE,UA: NORMAL
SL AMB POCT BILIRUBIN,UA: NORMAL
SL AMB POCT BLOOD,UA: NORMAL
SL AMB POCT CLARITY,UA: CLEAR
SL AMB POCT COLOR,UA: YELLOW
SL AMB POCT KETONES,UA: NORMAL
SL AMB POCT NITRITE,UA: NORMAL
SL AMB POCT PH,UA: 6.5
SL AMB POCT SPECIFIC GRAVITY,UA: 1010
SL AMB POCT URINE PROTEIN: NORMAL
SL AMB POCT UROBILINOGEN: 0.2

## 2024-05-07 PROCEDURE — 99204 OFFICE O/P NEW MOD 45 MIN: CPT | Performed by: UROLOGY

## 2024-05-07 PROCEDURE — 81002 URINALYSIS NONAUTO W/O SCOPE: CPT | Performed by: UROLOGY

## 2024-05-07 PROCEDURE — 51798 US URINE CAPACITY MEASURE: CPT | Performed by: UROLOGY

## 2024-05-07 NOTE — PROGRESS NOTES
Keo Summers is a(n) 83 y.o. female. , :  1940    Subjective     Assessment:  The primary encounter diagnosis was Stress incontinence of urine. Diagnoses of Urge incontinence of urine and Obesity (BMI 30.0-34.9) were also pertinent to this visit.    83-year-old woman presents with vague complaints of mixed urinary incontinence.  Denies urinary infections.  Denies hematuria.  Gets up frequently at night with the urge to go and leaks before she gets to the bathroom.  Claims that she saw somebody in [place] for this as well as somebody in [place].  I have none of those records.  Claims that she took a pill for a month.  Cannot recall the name.  Does not think it really helped her.  Denied prior surgery for prolapse or incontinence.  Still has uterus present.  Explained to her the process that we go through to try to evaluate her incontinence.  She will need a bladder scan.  She should have cystoscopy and pelvic examination with a full bladder.  We will attempt to elicit stress incontinence and see if she has prolapse.  She may ultimately need urodynamics before we decide how to best treat her.  Anticholinergics are probably not ideal in the elderly.  We have to make sure that she is not constipated coming into the treatment if she has a history of diverticular disease with diversion and reconnection of her gut.  Could have had denervation of the bladder from that pair of operations.  Urine today showed just trace leukocytes.  Not getting treated with antibiotics.  Hemoglobin A1c 5.3 2024.  PVR 45 mL.    Plan: She should undergo cystoscopy with pelvic examination and a uroflow.  Advised her it might be 6 weeks or so unless there is a cancellation.  She is aware.  Also claims that there are lesions on her labia which she wants evaluated at the time of pelvic examination.  Had 1 previously burned off.     Radiology  2021 CT abdomen pelvis with contrast  Bilateral renal cysts.  No renal masses.  " No hydronephrosis.     History  Mixed incontinence.  PVR 45 mL.  Obesity BMI 35.  Diabetic.  A1c pretty well-controlled April 2024    Prior Visits  None    5/7/2024 OV L[facility]  83-year-old woman presents with vague complaints of mixed urinary incontinence.  Denies urinary infections.  Denies hematuria.  Gets up frequently at night with the urge to go and leaks before she gets to the bathroom.  Claims that she saw somebody in [place] for this as well as somebody in [place].  I have none of those records.  Claims that she took a pill for a month.  Cannot recall the name.  Does not think it really helped her.  Denied prior surgery for prolapse or incontinence.  Still has uterus present.  Explained to her the process that we go through to try to evaluate her incontinence.  She will need a bladder scan.  She should have cystoscopy and pelvic examination with a full bladder.  We will attempt to elicit stress incontinence and see if she has prolapse.  She may ultimately need urodynamics before we decide how to best treat her.  Anticholinergics are probably not ideal in the elderly.  We have to make sure that she is not constipated coming into the treatment if she has a history of diverticular disease with diversion and reconnection of her gut.  Could have had denervation of the bladder from that pair of operations.  Urine today showed just trace leukocytes.  Not getting treated with antibiotics.  Hemoglobin A1c 5.3 April 2024.  PVR 45 mL.    Plan: She should undergo cystoscopy with pelvic examination and a uroflow.  Advised her it might be 6 weeks or so unless there is a cancellation.  She is aware.    Review of Systems    No results found for: \"PSA\"  No results found for: \"TESTOSTERONE\"  No components found for: \"CR\"  No results found for: \"HBA1C\"    Objective     /80 (BP Location: Left arm, Patient Position: Sitting, Cuff Size: Standard)   Pulse 84   Ht 5' 4\" (1.626 m)   Wt 93.9 kg (207 lb)   SpO2 97%   " BMI 35.53 kg/m²     Physical Exam      Brant Liane, [facility]

## 2024-05-07 NOTE — PATIENT INSTRUCTIONS
We will arrange to look in your bladder with a lighted camera and do with we call a flow test to make sure that you are urination is good before we make any changes with medication or surgery.

## 2024-06-13 DIAGNOSIS — E78.5 DYSLIPIDEMIA: ICD-10-CM

## 2024-06-13 DIAGNOSIS — E11.00 TYPE 2 DIABETES MELLITUS WITH HYPEROSMOLARITY WITHOUT COMA, WITHOUT LONG-TERM CURRENT USE OF INSULIN (HCC): ICD-10-CM

## 2024-06-13 RX ORDER — LIRAGLUTIDE 6 MG/ML
INJECTION SUBCUTANEOUS
Qty: 9 ML | Refills: 1 | Status: SHIPPED | OUTPATIENT
Start: 2024-06-13

## 2024-06-13 RX ORDER — ATORVASTATIN CALCIUM 40 MG/1
40 TABLET, FILM COATED ORAL DAILY
Qty: 90 TABLET | Refills: 0 | Status: SHIPPED | OUTPATIENT
Start: 2024-06-13

## 2024-07-12 ENCOUNTER — PROCEDURE VISIT (OUTPATIENT)
Dept: UROLOGY | Facility: CLINIC | Age: 84
End: 2024-07-12
Payer: COMMERCIAL

## 2024-07-12 VITALS
WEIGHT: 205 LBS | DIASTOLIC BLOOD PRESSURE: 80 MMHG | SYSTOLIC BLOOD PRESSURE: 126 MMHG | OXYGEN SATURATION: 96 % | BODY MASS INDEX: 35 KG/M2 | HEART RATE: 90 BPM | HEIGHT: 64 IN

## 2024-07-12 DIAGNOSIS — N39.41 URGE INCONTINENCE OF URINE: ICD-10-CM

## 2024-07-12 DIAGNOSIS — N39.3 STRESS INCONTINENCE OF URINE: Primary | ICD-10-CM

## 2024-07-12 PROCEDURE — 52000 CYSTOURETHROSCOPY: CPT | Performed by: UROLOGY

## 2024-07-12 RX ORDER — LISINOPRIL 20 MG/1
TABLET ORAL
COMMUNITY

## 2024-07-12 RX ORDER — VENLAFAXINE HYDROCHLORIDE 75 MG/1
CAPSULE, EXTENDED RELEASE ORAL
COMMUNITY

## 2024-07-12 RX ORDER — GABAPENTIN 300 MG/1
CAPSULE ORAL
COMMUNITY

## 2024-07-12 RX ORDER — DILTIAZEM HYDROCHLORIDE 180 MG/1
CAPSULE, EXTENDED RELEASE ORAL
COMMUNITY

## 2024-07-12 RX ORDER — FLUTICASONE PROPIONATE 50 MCG
SPRAY, SUSPENSION (ML) NASAL
COMMUNITY

## 2024-07-12 RX ORDER — DULOXETIN HYDROCHLORIDE 30 MG/1
CAPSULE, DELAYED RELEASE ORAL
COMMUNITY

## 2024-07-12 RX ORDER — TRAVOPROST OPHTHALMIC SOLUTION 0.04 MG/ML
SOLUTION OPHTHALMIC
COMMUNITY

## 2024-07-12 RX ORDER — MONTELUKAST SODIUM 10 MG/1
TABLET ORAL
COMMUNITY

## 2024-07-12 RX ORDER — BLOOD-GLUCOSE METER
EACH MISCELLANEOUS
COMMUNITY

## 2024-07-12 RX ORDER — DILTIAZEM HYDROCHLORIDE 120 MG/1
CAPSULE, EXTENDED RELEASE ORAL
COMMUNITY

## 2024-07-12 RX ORDER — TIZANIDINE 2 MG/1
TABLET ORAL
COMMUNITY

## 2024-07-12 RX ORDER — DICLOFENAC POTASSIUM 50 MG/1
TABLET, FILM COATED ORAL
COMMUNITY

## 2024-07-12 RX ORDER — DULOXETIN HYDROCHLORIDE 60 MG/1
CAPSULE, DELAYED RELEASE ORAL
COMMUNITY

## 2024-07-12 RX ORDER — OMEPRAZOLE 40 MG/1
CAPSULE, DELAYED RELEASE ORAL
COMMUNITY

## 2024-07-12 RX ORDER — LISINOPRIL 10 MG/1
TABLET ORAL
COMMUNITY

## 2024-07-12 RX ORDER — METFORMIN HYDROCHLORIDE 500 MG/1
TABLET, EXTENDED RELEASE ORAL
COMMUNITY

## 2024-07-12 RX ORDER — LANCETS
EACH MISCELLANEOUS
COMMUNITY

## 2024-07-12 RX ORDER — LIRAGLUTIDE 6 MG/ML
INJECTION SUBCUTANEOUS
COMMUNITY

## 2024-07-12 NOTE — PATIENT INSTRUCTIONS
The lesion on the right labia can be removed fairly easily by the gynecologist.  There was definitely leakage with a cough today with filling of the bladder.  I know that the biggest complaint you have is leakage when you get out of bed and periodically during the day.  Does not sound like this is from coughing or sneezing so I suspect it is from bladder overactivity.  We could certainly figure this out more easily by a test called urodynamics.  We can do this at the [place] office.  We will try to help you to arrange this.

## 2024-07-12 NOTE — PROGRESS NOTES
Cystoscopy and Pelvic after Uroflow testing     Date/Time  7/12/2024 3:00 PM     Performed by  Brant Rob MD   Authorized by  Brant Rob MD     Universal Protocol:  Consent: Written consent obtained.  Risks and benefits: risks, benefits and alternatives were discussed  Consent given by: patient  Patient identity confirmed: verbally with patient      Procedure Details:  Procedure type: cystoscopy    Patient tolerance: Patient tolerated the procedure well with no immediate complications    Additional Procedure Details: Indication  5/7/2024   History  · Mixed incontinence.  PVR 45 mL.  Obesity BMI 35.  Diabetic.  A1c pretty well-controlled April 2024    83-year-old woman presents with vague complaints of mixed urinary incontinence.  Denies urinary infections.  Denies hematuria.  Gets up frequently at night with the urge to go and leaks before she gets to the bathroom.  Claims that she saw somebody in [place] for this as well as somebody in [place].  I have none of those records.  Claims that she took a pill for a month.  Cannot recall the name.  Does not think it really helped her.  Denied prior surgery for prolapse or incontinence.  Still has uterus present.  Explained to her the process that we go through to try to evaluate her incontinence.  She will need a bladder scan.  She should have cystoscopy and pelvic examination with a full bladder.  We will attempt to elicit stress incontinence and see if she has prolapse.  She may ultimately need urodynamics before we decide how to best treat her.  Anticholinergics are probably not ideal in the elderly.  We have to make sure that she is not constipated coming into the treatment if she has a history of diverticular disease with diversion and reconnection of her gut.  Could have had denervation of the bladder from that pair of operations.  Urine today showed just trace leukocytes.  Not getting treated with antibiotics.  Hemoglobin A1c 5.3 April 2024.   PVR 45 mL.    Findings: She did a uroflow prior to the procedure.  Maximum flow rate was insignificant.  Voided 16 mL.  Not sure why she did not come with a full bladder.  Pelvic examination reveals no evidence of cystocele.  Mild urethral prolapse.  Bladder was filled after she was sterilely prepped and draped.  She could only hold about 150 mL.  She leaked with some urethral hypermobility during a cough procedure.  Support of the urethra.  The leakage with cough.  Might benefit from sling procedure.  Probably needs urodynamics to evaluate for whether her leakage is intrinsic sphincter deficiency versus urethral hypermobility.  She might not necessarily have to have a sling.  Some of her issue is at least related to overactivity.  She might benefit from anticholinergic.    Plan: Urodynamics and then follow-up regarding the urinary issues.  She can see the GYN regarding the right labial lesion which looks like about a three-quarter inch fleshy wart of the skin.  Does not look malignant.

## 2024-07-18 ENCOUNTER — TELEPHONE (OUTPATIENT)
Age: 84
End: 2024-07-18

## 2024-07-18 NOTE — TELEPHONE ENCOUNTER
Patient called today questioning her 7/24 Urodynamics test.    Patient canceled it due to having a CT Scan scheduled that dame day at 10 AM.    Pt is questioning if she needs this still, stating that she feels this is the same reason she is having a Ct Scan done.    Pt is going to contact the Ct Scan ordering provider for more information.    Pt will call back if she needs to reschedule appointment.    Please review.    Call back [phone]

## 2024-07-19 NOTE — TELEPHONE ENCOUNTER
Please call the patient and let her know that I reviewed her most recent OV note and she should plan to proceed with the urodynamics as this helps to better understand how her bladder is functioning. The CT scan also gives us more information to see if there is anything going on within the kidneys and bladder that could be causing her symptoms. She should plan to proceed with both tests.

## 2024-07-24 NOTE — TELEPHONE ENCOUNTER
Call placed to patient. She did not answer. LMOM informing patient of the AP recommendations and instructions.   Office number was provided for patient to call back and reschedule UDS testing

## 2024-07-25 NOTE — TELEPHONE ENCOUNTER
CALLED PT AND LM THAT THE SOONEST WE CAN GIVE HER WOULD BE IN THE [place] LOCATION ON 9/25/24. IF SHE DOESN'T WANT THAT APPT THEN THE NEXT APPT AT [place] IS WHAT SHE HAS ALREADY SCHEDULED. TELL HER WE CAN PUT HER ON A WAIT LIST ALSO IF SHE LIKES.

## 2024-07-25 NOTE — TELEPHONE ENCOUNTER
Pt called to reschedule urodynamic test     Offered 10/3 but pt refused and requested sooner appt   Pt will go to a diff location if needed.       Please review

## 2024-08-01 DIAGNOSIS — I49.9 IRREGULAR HEART BEAT: ICD-10-CM

## 2024-08-01 DIAGNOSIS — I10 PRIMARY HYPERTENSION: ICD-10-CM

## 2024-08-01 RX ORDER — METOPROLOL SUCCINATE 50 MG/1
50 TABLET, EXTENDED RELEASE ORAL
Qty: 90 TABLET | Refills: 0 | Status: SHIPPED | OUTPATIENT
Start: 2024-08-01 | End: 2024-10-30

## 2024-08-01 NOTE — TELEPHONE ENCOUNTER
Medication: metoprolol succinate (TOPROL-XL)     Dose/Frequency: Take 1 tablet (50 mg total) by mouth daily at bedtime     Quantity: 30    Pharm[facility] [facility] [place]     Office:   [] PCP/Provider -   [x] Speciality/Provider -     Does the patient have enough for 3 days?   [x] Yes   [] No - Send as HP to POD       Patient asked if she can have a dispense of 90 instead?

## 2024-08-13 ENCOUNTER — OFFICE VISIT (OUTPATIENT)
Dept: ENDOCRINOLOGY | Facility: CLINIC | Age: 84
End: 2024-08-13
Payer: COMMERCIAL

## 2024-08-13 VITALS
BODY MASS INDEX: 34.83 KG/M2 | SYSTOLIC BLOOD PRESSURE: 110 MMHG | HEIGHT: 64 IN | WEIGHT: 204 LBS | OXYGEN SATURATION: 98 % | DIASTOLIC BLOOD PRESSURE: 72 MMHG | HEART RATE: 78 BPM

## 2024-08-13 DIAGNOSIS — E11.00 TYPE 2 DIABETES MELLITUS WITH HYPEROSMOLARITY WITHOUT COMA, WITHOUT LONG-TERM CURRENT USE OF INSULIN (HCC): Primary | ICD-10-CM

## 2024-08-13 DIAGNOSIS — E55.9 VITAMIN D INSUFFICIENCY: ICD-10-CM

## 2024-08-13 DIAGNOSIS — I10 BENIGN ESSENTIAL HYPERTENSION: ICD-10-CM

## 2024-08-13 DIAGNOSIS — R42 DIZZINESS: ICD-10-CM

## 2024-08-13 DIAGNOSIS — E11.42 TYPE 2 DIABETES MELLITUS WITH DIABETIC POLYNEUROPATHY, WITHOUT LONG-TERM CURRENT USE OF INSULIN (HCC): ICD-10-CM

## 2024-08-13 LAB — SL AMB POCT HEMOGLOBIN AIC: 5.4 (ref ?–6.5)

## 2024-08-13 PROCEDURE — 83036 HEMOGLOBIN GLYCOSYLATED A1C: CPT | Performed by: NURSE PRACTITIONER

## 2024-08-13 PROCEDURE — 99214 OFFICE O/P EST MOD 30 MIN: CPT | Performed by: NURSE PRACTITIONER

## 2024-08-13 NOTE — PATIENT INSTRUCTIONS
Hold victoza for now and checking blood sugars 1 time a day at different times before meals or at least 2 hours after (bedtime).     Call with results in 2 weeks.     IF you are experiencing severe headaches with visual changes, numbness, weakness call 9-1-1 and get evaluated in the Emergency Room.

## 2024-08-13 NOTE — ASSESSMENT & PLAN NOTE
Lab Results   Component Value Date    HGBA1C 5.4 08/13/2024     HGA1C tightly controlled. Blood sugars rarely under 100 mg/dL. Recommend holding victoza. Check blood sugars for 1-2 weeks and send in/call in blood sugars.     Discussed risks/complications associated with uncontrolled diabetes including organ involvement, heart attack, stroke, death.     Advised lifestyle modifications including attention to diet including the amount and types of carbohydrates consumed and regular activity.     Call for blood sugars less than 70 mg/dl or patterns over 200 mg/dl.     Monitor blood glucose levels at least 2 times a day alternating times.     Recommendation for medical identification either bracelet, necklace.      Routine follow up for diabetic eye and foot exams.     Ordered blood work to complete prior to next visit.    Send glucose logs/CGM download in 1-2 weeks for review    Follow up in 3 months.

## 2024-08-13 NOTE — PROGRESS NOTES
Established Patient Progress Note    Chief Complaint:  Diabetes follow up visit    Impression & Plan:    Problem List Items Addressed This Visit          Cardiovascular and Mediastinum    Benign essential hypertension     BP well controlled on current regimen.             Endocrine    Type 2 diabetes mellitus with diabetic polyneuropathy, without long-term current use of insulin ([facility])       Lab Results   Component Value Date    HGBA1C 5.4 08/13/2024     HGA1C tightly controlled. Blood sugars rarely under 100 mg/dL. Recommend holding victoza. Check blood sugars for 1-2 weeks and send in/call in blood sugars.     Discussed risks/complications associated with uncontrolled diabetes including organ involvement, heart attack, stroke, death.     Advised lifestyle modifications including attention to diet including the amount and types of carbohydrates consumed and regular activity.     Call for blood sugars less than 70 mg/dl or patterns over 200 mg/dl.     Monitor blood glucose levels at least 2 times a day alternating times.     Recommendation for medical identification either bracelet, necklace.      Routine follow up for diabetic eye and foot exams.     Ordered blood work to complete prior to next visit.    Send glucose logs/CGM download in 1-2 weeks for review    Follow up in 3 months.               Other    Vitamin D insufficiency     Patient unsure of current dose.  Recommend rechecking vitamin D 25 hydroxy level.          Relevant Orders    Vitamin D 25 hydroxy     Other Visit Diagnoses       Type 2 diabetes mellitus with hyperosmolarity without coma, without long-term current use of insulin ([facility])    -  Primary    Relevant Orders    POCT hemoglobin A1c (Completed)    Comprehensive metabolic panel    Lipid panel    Albumin / creatinine urine ratio    TSH, 3rd generation    T4, free    Dizziness                History of Present Illness:   Keo Summers is a 84 y.o. female with a history of type 2 diabetes  without long term use of insulin. Reports complications of neuropathy.    Denies recent illness or hospitalizations.     Denies recent severe hypoglycemic or severe hyperglycemic episodes.     Denies any issues with her current regimen.     Home glucose monitoring: are performed regularly with lowest 96 mg/dL.      Current regimen:  Victoza 0.6 mg      Off of metformin, did not tolerate     Last Eye Exam: UTD, no history of DR. Previous history of cataract surgery.   Last Foot Exam: UTD     Has hypertension: No ACE/ARB   Has hyperlipidemia: Taking atorvastatin 40 mg daily            No history of thyroid disease  No history of pancreatitis      Taking vitamin D3 unsure of dose.     Patient Active Problem List   Diagnosis    Chronic pain syndrome    Chronic bilateral low back pain with bilateral sciatica    Spinal stenosis of lumbar region with neurogenic claudication    Lumbar radiculopathy    Lumbar degenerative disc disease    Low back pain    Benign essential hypertension    GERD (gastroesophageal reflux disease)    Palpitation    COVID-19    Colon polyps    Type 2 diabetes mellitus with diabetic polyneuropathy, without long-term current use of insulin (HCC)    Cervical radiculopathy    Vitamin D insufficiency    Urinary incontinence    Persistent insomnia of non-organic origin    Memory difficulty    Glaucoma    Dyspnea on exertion    Diverticulitis of intestine with perforation    Benign paroxysmal positional vertigo    Anxiety    Allergic rhinitis    Increased frequency of urination    Obesity, morbid (HCC)    Numbness and tingling    Weight gain    Obesity (BMI 30.0-34.9)      Past Medical History:   Diagnosis Date    Arthritis     DJD- left thumb DJD,most joints    Borderline diabetes     Change in bowel habits     Chronic pain disorder     lumbar    Colon polyp     Constipation     unable to expell the stool completely with BM    COVID-19 2020    in the winter    Deviated septum     Diabetes mellitus (HCC)   "   Diverticulosis     diverticulitis- with resection-colostomy then reversed    DJD (degenerative joint disease)     pt does get injections to the knee with Dr. Collazo    GERD (gastroesophageal reflux disease)     Glaucoma     Hearing aid worn     bilateral ears for background noise    Hyperlipidemia     Irregular heart beat     \"skips a beat\"    Low back pain     Lumbar degenerative disc disease 09/16/2019    Muscle spasm     legs    Peripheral neuropathy     PND (post-nasal drip)     Presence of upper and lower permanent dental bridges     Sciatica     Spinal stenosis     Wears glasses       Past Surgical History:   Procedure Laterality Date    BREAST SURGERY  2005    bilateral reduction    COLON SURGERY  2014    resection with colostomy-ruptured \"intestine\"    COLONOSCOPY N/A 07/11/2016    Procedure: COLONOSCOPY;  Surgeon: Sunny Wyman MD;  Location: [facility];  Service:     COLOSTOMY CLOSURE  2015    COMBINED REDUCTION MAMMAPLASTY W/ LIPOSUCTION      EPIDURAL BLOCK INJECTION N/A 11/15/2019    Procedure: L4 L5 Lumbar Epidural Steroid Injection (13674);  Surgeon: Sherman Dinero MD;  Location: [facility];  Service: Pain Management     EPIDURAL BLOCK INJECTION N/A 03/12/2020    Procedure: L4 L5 Lumbar Epidural Steroid Injection (16566);  Surgeon: Sherman Dinero MD;  Location: [facility];  Service: Pain Management     EPIDURAL BLOCK INJECTION N/A 07/23/2020    Procedure: L4-L5 LUMBAR EPIDURAL STEROID INJECTION (#2) (82508);  Surgeon: Sherman Dinero MD;  Location: [facility];  Service: Pain Management     HERNIA MESH REMOVAL  06/22/2022    hernia repair, [place] [facility] [place] [address] [place]    HERNIA REPAIR  2016    x3    MS XCAPSL CTRC RMVL INSJ IO LENS PROSTH W/O ECP Right 08/22/2022    Procedure: EXTRACTION EXTRACAPSULAR CATARACT PHACO INTRAOCULAR LENS (IOL);  Surgeon: Cristian Ly MD;  Location: [facility];  Service: Ophthalmology    MS XCAPSL CTRC RMVL INSJ IO LENS PROSTH W/O ECP " Left 10/24/2022    Procedure: EXTRACTION EXTRACAPSULAR CATARACT PHACO INTRAOCULAR LENS (IOL);  Surgeon: Cristian Ly MD;  Location: [facility];  Service: Ophthalmology    TUBAL LIGATION        Family History   Problem Relation Age of Onset    Heart disease Mother     Dementia Father     No Known Problems Sister     Diabetes type II Brother     No Known Problems Maternal Aunt     No Known Problems Maternal Uncle     No Known Problems Paternal Aunt     No Known Problems Paternal Uncle     No Known Problems Maternal Grandmother     No Known Problems Maternal Grandfather     No Known Problems Paternal Grandmother     No Known Problems Paternal Grandfather      Social History     Tobacco Use    Smoking status: Former     Current packs/day: 0.00     Types: Cigarettes     Quit date: 1976     Years since quittin.1    Smokeless tobacco: Never   Substance Use Topics    Alcohol use: No     Allergies   Allergen Reactions    Metformin Vomiting     N/V, dizziness         Current Outpatient Medications:     ascorbic Acid (VITAMIN C) 500 MG CPCR, Take 1 capsule (500 mg total) by mouth 2 (two) times a day, Disp: 60 capsule, Rfl: 0    aspirin (ECOTRIN LOW STRENGTH) 81 mg EC tablet, 81 mg daily at bedtime, Disp: , Rfl:     atorvastatin (LIPITOR) 40 mg tablet, TAKE 1 TABLET (40 MG TOTAL) BY MOUTH DAILY, Disp: 90 tablet, Rfl: 0    cholecalciferol (VITAMIN D3) 1,000 units tablet, Take 1 tablet (1,000 Units total) by mouth daily, Disp: 30 tablet, Rfl: 0    Coenzyme Q10 (Co Q 10) 100 MG CAPS, Take by mouth daily with lunch, Disp: , Rfl:     COMFORT EZ PEN NEEDLES 31G X 6 MM MISC, daily , Disp: , Rfl:     diclofenac potassium (CATAFLAM) 50 mg tablet, , Disp: , Rfl:     diltiazem (CARDIZEM) 30 mg tablet, , Disp: , Rfl:     liraglutide (Victoza) injection, Inject 0.6 mg under the skin daily, Disp: , Rfl:     metoprolol succinate (TOPROL-XL) 50 mg 24 hr tablet, Take 1 tablet (50 mg total) by mouth daily at bedtime, Disp: 90  tablet, Rfl: 0    multivitamin (THERAGRAN) TABS, Take 1 tablet by mouth daily, Disp: , Rfl:     omeprazole (PriLOSEC) 40 MG capsule, , Disp: , Rfl:     travoprost (Travatan Z) 0.004 % ophthalmic solution, , Disp: , Rfl:     VITAMIN D PO, Take by mouth in the morning, Disp: , Rfl:     amoxicillin-clavulanate (AUGMENTIN) 875-125 mg per tablet, , Disp: , Rfl:     azelastine (ASTELIN) 0.1 % nasal spray, 1 spray into each nostril 2 (two) times a day as needed for rhinitis Use in each nostril as directed, Disp: , Rfl:     b complex vitamins capsule, Take 1 capsule by mouth daily, Disp: , Rfl:     Blood Glucose Monitoring Suppl (ONE TOUCH ULTRA 2) w/Device KIT, , Disp: , Rfl:     GEE SEED PO, Take by mouth in the morning (Patient not taking: Reported on 1/4/2024), Disp: , Rfl:     cyclobenzaprine (FLEXERIL) 10 mg tablet, , Disp: , Rfl:     diltiazem (Dilt-XR) 120 MG 24 hr capsule, , Disp: , Rfl:     diltiazem (Dilt-XR) 180 MG 24 hr capsule, , Disp: , Rfl:     DULoxetine (CYMBALTA) 30 mg delayed release capsule, , Disp: , Rfl:     DULoxetine (CYMBALTA) 60 mg delayed release capsule, , Disp: , Rfl:     ferrous sulfate 324 (65 Fe) mg, Take 1 tablet (324 mg total) by mouth daily before breakfast (Patient not taking: Reported on 5/2/2023), Disp: 30 tablet, Rfl: 0    Flaxseed, Linseed, (FLAX SEEDS PO), Take by mouth in the morning (Patient not taking: Reported on 8/2/2023), Disp: , Rfl:     fluticasone (FLONASE) 50 mcg/act nasal spray, , Disp: , Rfl:     folic acid (FOLVITE) 1 mg tablet, Take 1 tablet (1 mg total) by mouth daily (Patient not taking: Reported on 5/2/2023), Disp: 30 tablet, Rfl: 0    gabapentin (NEURONTIN) 300 mg capsule, , Disp: , Rfl:     glucose blood (Contour Next Test) test strip, USE 1 EACH IN THE MORNING FOR 90 DOSES CHECK SUGAR ONCE DAILY, Disp: 100 strip, Rfl: 3    HYDROCODONE-APAP-DIETARY PROD PO, , Disp: , Rfl:     hydrOXYzine HCL (ATARAX) 25 mg tablet, , Disp: , Rfl:     latanoprost (XALATAN) 0.005 %  "ophthalmic solution, Administer to both eyes daily at bedtime (Patient not taking: Reported on 8/2/2023), Disp: , Rfl:     lisinopril (ZESTRIL) 10 mg tablet, , Disp: , Rfl:     lisinopril (ZESTRIL) 20 mg tablet, , Disp: , Rfl:     montelukast (SINGULAIR) 10 mg tablet, , Disp: , Rfl:     ONE TOUCH CLUB LANCETS MIS, , Disp: , Rfl:     sertraline (ZOLOFT) 50 mg tablet, , Disp: , Rfl:     tiZANidine (ZANAFLEX) 2 mg tablet, , Disp: , Rfl:     TURMERIC PO, Take by mouth in the morning, Disp: , Rfl:     venlafaxine (EFFEXOR-XR) 75 mg 24 hr capsule, , Disp: , Rfl:     zinc sulfate (ZINCATE) 220 mg capsule, Take 1 capsule (220 mg total) by mouth daily (Patient not taking: Reported on 5/2/2023), Disp: 30 capsule, Rfl: 0    Review of Systems  Constitutional: Negative for fatigue. Denies weight change.    HENT: Negative.   Eyes: Blurry vision.   Respiratory: Positive cough chronically due to postnasal drip.   Cardiovascular: Recent history of heart palpitations but not currently   Gastrointestinal: Has had constipation intermittently. No nausea or vomiting. No abdominal pain.   Endocrine: Denies polyuria and polydipsia.    Musculoskeletal: Whole body aches.   Skin: Negative  Neurological: Headaches. Numbness and tingling in hands and feet. Having forgetfulness.   All other systems reviewed and are negative.      Physical Exam:  Body mass index is 35.02 kg/m².  /72 (BP Location: Left arm, Patient Position: Sitting, Cuff Size: Large)   Pulse 78   Ht 5' 4\" (1.626 m)   Wt 92.5 kg (204 lb)   SpO2 98%   BMI 35.02 kg/m²    Wt Readings from Last 3 Encounters:   08/13/24 92.5 kg (204 lb)   07/12/24 93 kg (205 lb)   05/07/24 93.9 kg (207 lb)        Physical Exam  Vitals reviewed.   Constitutional:       Appearance: Normal appearance.   Cardiovascular:      Rate and Rhythm: Normal rate and regular rhythm.      Pulses: Normal pulses.      Heart sounds: Normal heart sounds.   Pulmonary:      Effort: Pulmonary effort is normal. "      Breath sounds: Normal breath sounds.   Skin:     General: Skin is warm and dry.      Capillary Refill: Capillary refill takes less than 2 seconds.   Neurological:      General: No focal deficit present.      Mental Status: She is alert and oriented to person, place, and time.   Psychiatric:         Mood and Affect: Mood normal.         Behavior: Behavior normal.     Diabetic Foot Exam    Patient's shoes and socks removed.    Right Foot/Ankle   Right Foot Inspection  Skin Exam: skin normal. Skin not intact, no dry skin, no warmth, no callus, no erythema, no maceration, no abnormal color, no pre-ulcer, no ulcer and no callus.     Toe Exam: ROM and strength within normal limits.     Sensory   Vibration: diminished  Monofilament testing: diminished    Vascular  Capillary refills: < 3 seconds  The right DP pulse is 2+.     Left Foot/Ankle  Left Foot Inspection  Skin Exam: skin normal. Skin not intact, no dry skin, no warmth, no erythema, no maceration, normal color, no pre-ulcer, no ulcer and no callus.     Toe Exam: ROM and strength within normal limits.     Sensory   Vibration: diminished  Monofilament testing: diminished    Vascular  Capillary refills: < 3 seconds  The left DP pulse is 2+.     Assign Risk Category  No deformity present  Loss of protective sensation  No weak pulses  Risk: 1      Labs:   Lab Results   Component Value Date    HGBA1C 5.4 08/13/2024    HGBA1C 5.3 04/04/2024    HGBA1C 5.5 05/02/2023     Lab Results   Component Value Date    CREATININE 0.64 02/16/2021    CREATININE 0.87 01/01/2021    CREATININE 0.60 06/03/2016    BUN 22 02/16/2021     10/18/2013    K 4.2 02/16/2021     (H) 02/16/2021    CO2 28 02/16/2021     eGFR   Date Value Ref Range Status   02/16/2021 85 ml/min/1.73sq m Final     Lab Results   Component Value Date    HDL 47 04/04/2016    TRIG 132 04/04/2016     Lab Results   Component Value Date    ALT 24 01/01/2021    AST 25 01/01/2021    ALKPHOS 91 01/01/2021     "BILITOT 0.6 10/18/2013     Lab Results   Component Value Date    MVV3EUSXPKQY 2.441 04/04/2016    WVK0NQKLMSSD 2.07 10/18/2013     No results found for: \"FREET4\", \"TSI\"    Discussed with the patient and all questioned fully answered. She will call me if any problems arise.    Follow-up appointment in 3 months.     Counseled patient on diagnostic results, prognosis, risk and benefit of treatment options, instruction for management, importance of treatment compliance, Risk  factor reduction and impressions    JOSSE Sterling    "

## 2024-09-30 ENCOUNTER — TELEPHONE (OUTPATIENT)
Age: 84
End: 2024-09-30

## 2024-09-30 NOTE — TELEPHONE ENCOUNTER
Patient calling in with questions regarding her UDS appointment. Answered all her questions and confirmed her upcoming appointment date , time and location.

## 2024-10-01 ENCOUNTER — TELEPHONE (OUTPATIENT)
Dept: UROLOGY | Facility: AMBULATORY SURGERY CENTER | Age: 84
End: 2024-10-01

## 2024-10-03 ENCOUNTER — PROCEDURE VISIT (OUTPATIENT)
Dept: UROLOGY | Facility: CLINIC | Age: 84
End: 2024-10-03
Payer: COMMERCIAL

## 2024-10-03 VITALS
OXYGEN SATURATION: 95 % | WEIGHT: 204.6 LBS | HEIGHT: 64 IN | BODY MASS INDEX: 34.93 KG/M2 | DIASTOLIC BLOOD PRESSURE: 82 MMHG | SYSTOLIC BLOOD PRESSURE: 132 MMHG | HEART RATE: 63 BPM

## 2024-10-03 DIAGNOSIS — N39.41 URGE INCONTINENCE OF URINE: Primary | ICD-10-CM

## 2024-10-03 DIAGNOSIS — N32.81 DETRUSOR INSTABILITY: ICD-10-CM

## 2024-10-03 LAB
SL AMB  POCT GLUCOSE, UA: NORMAL
SL AMB LEUKOCYTE ESTERASE,UA: NORMAL
SL AMB POCT BILIRUBIN,UA: NORMAL
SL AMB POCT BLOOD,UA: NORMAL
SL AMB POCT CLARITY,UA: CLEAR
SL AMB POCT COLOR,UA: YELLOW
SL AMB POCT KETONES,UA: NORMAL
SL AMB POCT NITRITE,UA: NORMAL
SL AMB POCT PH,UA: 7
SL AMB POCT SPECIFIC GRAVITY,UA: 1.01
SL AMB POCT URINE PROTEIN: NORMAL
SL AMB POCT UROBILINOGEN: 0.2

## 2024-10-03 PROCEDURE — 51797 INTRAABDOMINAL PRESSURE TEST: CPT

## 2024-10-03 PROCEDURE — 51728 CYSTOMETROGRAM W/VP: CPT

## 2024-10-03 PROCEDURE — 81002 URINALYSIS NONAUTO W/O SCOPE: CPT

## 2024-10-03 PROCEDURE — 51784 ANAL/URINARY MUSCLE STUDY: CPT

## 2024-10-03 RX ORDER — MAGNESIUM L-LACTATE 84 MG
84 TABLET, EXTENDED RELEASE ORAL DAILY
COMMUNITY

## 2024-10-03 RX ORDER — POTASSIUM CHLORIDE 750 MG/1
10 CAPSULE, EXTENDED RELEASE ORAL DAILY
COMMUNITY

## 2024-10-03 NOTE — PROGRESS NOTES
"CC: \"I constantly have to go and not too much comes out. I leak when I cough and sneeze and sometimes on the way to the bathroom\"  Denies pain /burning with voiding    Voided 30 ml prior to test, straight catheterized for 2 ml    CMG:       Position:  sitting           Fill sensation:    6 ml,    pdet    1 cmH2O       First urge:        57 ml,     pdet   3 cmH2O        Normal urge:   75  ml,     pdet    4 cmH2O       Must urge:         88ml,     pdet    7 cmH2O    Permission to void:             201 ml,     pdet    13 cmH2O       Max pdet during void    25cm H2O       Voided volume:   168.5  ml    Bladder stability:   unstable at 162  ml without leakage, 189 and 201 ml with leakage    Compliance:  normal         Sphincter function  No TC provoked at 100 ml    EMG activity:       Normal during filling,        normal during voiding    Comments::     Sensory urgency   + DI with leakage   Unable to fill enough to adequately assess for TC due to DI with leakage    Urodynamics    Date/Time: 10/3/2024 8:30 AM    Performed by: Marya Wellington RN  Authorized by: Thiago Galindo MD  Barry Protocol:  procedure performed by consultantConsent: Verbal consent obtained. Written consent obtained.  Risks and benefits: risks, benefits and alternatives were discussed  Consent given by: patient  Patient understanding: patient states understanding of the procedure being performed  Patient consent: the patient's understanding of the procedure matches consent given  Procedure consent: procedure consent matches procedure scheduled  Patient identity confirmed: verbally with patient  Procedure - Urodynamics:  Procedure details: CMG and EMG      Voiding Pressure Study: Yes    Intra-abdominal Voiding Pressure Study: Yes    Post-procedure:     Patient tolerance: Patient tolerated procedure well with no immediate complications          "

## 2024-10-08 DIAGNOSIS — I10 PRIMARY HYPERTENSION: ICD-10-CM

## 2024-10-08 DIAGNOSIS — E78.5 DYSLIPIDEMIA: ICD-10-CM

## 2024-10-08 DIAGNOSIS — I49.9 IRREGULAR HEART BEAT: ICD-10-CM

## 2024-10-08 NOTE — TELEPHONE ENCOUNTER
Medication: atorvastatin (LIPITOR) 40 mg tablet     Dose/Frequency: TAKE 1 TABLET (40 MG TOTAL) BY MOUTH DAILY     Quantity: 90 tablet     Pharmacy: [address]     Office:   [] PCP/Provider -   [x] Speciality/Provider - Nicole Pride, DO     Does the patient have enough for 3 days?   [] Yes   [x] No - Send as HP to POD    Medication: metoprolol succinate (TOPROL-XL) 50 mg 24 hr tablet     Dose/Frequency: Take 1 tablet (50 mg total) by mouth daily at bedtime     Quantity: 90 tablet     Pharmacy: [address]     Office:   [] PCP/Provider -   [x] Speciality/Provider - JOSSE Amaya     Does the patient have enough for 3 days?   [x] Yes   [] No - Send as HP to POD    *patient is completely out of torvastatin (LIPITOR) 40 mg tablet ... Didn't have it yesterday either

## 2024-10-09 RX ORDER — ATORVASTATIN CALCIUM 40 MG/1
40 TABLET, FILM COATED ORAL DAILY
Qty: 90 TABLET | Refills: 0 | Status: SHIPPED | OUTPATIENT
Start: 2024-10-09

## 2024-10-09 RX ORDER — METOPROLOL SUCCINATE 50 MG/1
50 TABLET, EXTENDED RELEASE ORAL
Qty: 90 TABLET | Refills: 1 | Status: SHIPPED | OUTPATIENT
Start: 2024-10-09 | End: 2025-01-07

## 2024-10-14 NOTE — PROGRESS NOTES
Keo Summers is a(n) 84 y.o. female. , :  1940    Subjective     Assessment:  The encounter diagnosis was OAB (overactive bladder).  OAB  OAB wet 5+ years  Hx of glaucoma     TC  TC tx with pads       Plan:  F/u 4 months to check on OAB  Oxybutynin XL 5mg daily;  SED: dry eye, constipation, confusion.   Pt holding on starting the medication until she clears it with her eye MD in light of her glaucoma history.   Discussed pelvic floor exercise with pt.  May help with her TC and OAB component.         Radiology   CT ap w CTS 21    KIDNEYS/URETERS:  Several small cysts are unchanged bilaterally. No hydronephrosis.   URINARY BLADDER: Unremarkable.      History  OAB wet     Past  surgical history   24 Cystoscopy  No cystocele, mild urethral prolapse, some leak with urethral hypermobility  10/03/24 UDS No TC at 100mL, Detrusor instability    Prior Visits  24  Dr. Rob   83-year-old woman presents with vague complaints of mixed urinary incontinence.  Denies urinary infections.  Denies hematuria.  Gets up frequently at night with the urge to go and leaks before she gets to the bathroom.  Claims that she saw somebody in [place] for this as well as somebody in [place].  I have none of those records.  Claims that she took a pill for a month.  Cannot recall the name.  Does not think it really helped her.  Denied prior surgery for prolapse or incontinence.  Still has uterus present.  Explained to her the process that we go through to try to evaluate her incontinence.  She will need a bladder scan.  She should have cystoscopy and pelvic examination with a full bladder.  We will attempt to elicit stress incontinence and see if she has prolapse.  She may ultimately need urodynamics before we decide how to best treat her.  Anticholinergics are probably not ideal in the elderly.  We have to make sure that she is not constipated coming into the treatment if she has a history of  "diverticular disease with diversion and reconnection of her gut.  Could have had denervation of the bladder from that pair of operations.  Urine today showed just trace leukocytes.  Not getting treated with antibiotics.  Hemoglobin A1c 5.3 April 2024.  PVR 45 mL.     Plan: She should undergo cystoscopy with pelvic examination and a uroflow.  Advised her it might be 6 weeks or so unless there is a cancellation.  She is aware.  Also claims that there are lesions on her labia which she wants evaluated at the time of pelvic examination.  Had 1 previously burned off.      10/17/2024 OV Leroy Werner  With OAB and UDS 10/03/24 that indicated detrusor instability.  Unable  to adequately assess for TC due to DI with leakage. Continues to urinary frequency/urgency for small amounts.  Notes often wakes up at night and then when awake will void.  If tries to hold her urine too long will have occ leakage. Has leakage with coughing and sneezing and uses a pad.  No need for a pull up at current time. Pt notes not taking potassium on her med list.  Has hx of glaucoma and will check with eye MD prior to starting meds.     Review of Systems    Objective     /77 (BP Location: Left arm, Patient Position: Sitting, Cuff Size: Adult)   Pulse 74   Ht 5' 4\" (1.626 m)   Wt 92.5 kg (204 lb)   BMI 35.02 kg/m²     Physical Exam  Cardiovascular:      Rate and Rhythm: Normal rate.   Pulmonary:      Effort: Pulmonary effort is normal.   Skin:     General: Skin is warm.   Neurological:      Mental Status: She is alert.   Psychiatric:         Mood and Affect: Mood normal.           Leroy Werner [facility]  "

## 2024-10-17 ENCOUNTER — OFFICE VISIT (OUTPATIENT)
Dept: CARDIOLOGY CLINIC | Facility: CLINIC | Age: 84
End: 2024-10-17
Payer: COMMERCIAL

## 2024-10-17 ENCOUNTER — OFFICE VISIT (OUTPATIENT)
Dept: UROLOGY | Facility: CLINIC | Age: 84
End: 2024-10-17
Payer: COMMERCIAL

## 2024-10-17 VITALS
SYSTOLIC BLOOD PRESSURE: 134 MMHG | HEIGHT: 64 IN | WEIGHT: 204 LBS | DIASTOLIC BLOOD PRESSURE: 77 MMHG | HEART RATE: 74 BPM | BODY MASS INDEX: 34.83 KG/M2

## 2024-10-17 VITALS
BODY MASS INDEX: 34.83 KG/M2 | HEIGHT: 64 IN | HEART RATE: 78 BPM | WEIGHT: 204 LBS | DIASTOLIC BLOOD PRESSURE: 82 MMHG | OXYGEN SATURATION: 97 % | SYSTOLIC BLOOD PRESSURE: 114 MMHG

## 2024-10-17 DIAGNOSIS — I49.9 IRREGULAR HEART BEAT: Primary | ICD-10-CM

## 2024-10-17 DIAGNOSIS — R00.2 PALPITATIONS: ICD-10-CM

## 2024-10-17 DIAGNOSIS — I10 BENIGN ESSENTIAL HYPERTENSION: ICD-10-CM

## 2024-10-17 DIAGNOSIS — E78.5 DYSLIPIDEMIA: ICD-10-CM

## 2024-10-17 DIAGNOSIS — N32.81 OAB (OVERACTIVE BLADDER): Primary | ICD-10-CM

## 2024-10-17 PROCEDURE — 93000 ELECTROCARDIOGRAM COMPLETE: CPT | Performed by: INTERNAL MEDICINE

## 2024-10-17 PROCEDURE — 99213 OFFICE O/P EST LOW 20 MIN: CPT | Performed by: PHYSICIAN ASSISTANT

## 2024-10-17 PROCEDURE — 99214 OFFICE O/P EST MOD 30 MIN: CPT | Performed by: INTERNAL MEDICINE

## 2024-10-17 RX ORDER — OXYBUTYNIN CHLORIDE 5 MG/1
5 TABLET, EXTENDED RELEASE ORAL DAILY
Qty: 90 TABLET | Refills: 3 | Status: SHIPPED | OUTPATIENT
Start: 2024-10-17

## 2024-10-17 NOTE — PROGRESS NOTES
Cardiology   Nicole Pride DO, [facility]  Warren Clark MD, [facility]  Dmitri Brooks MD, [facility]  Lissette Galindo MD, [facility]  -------------------------------------------------------------------  [facility]  [address], [address], [address]  [place], [place], [address]  [phone]    Cardiology Follow Up  Keo Summers  1940  [id number]          Assessment/Plan:    1. Irregular heart beat    2. Benign essential hypertension    3. Dyslipidemia    4. Palpitations      - Patient with intermittent palpitations - There was a 3% PVC burden during her last.  - Will continue metoprolol 50 mg daily.     - continue atorvastatin   - Has blood work with her PCP - most recent available study from 2023 showed normal lipid levels.   - Encouraged to ambulate.         Interval History:     Keo Summers is 84 y.o. female here for followup of palpitations.    She has been feeling shortness of breath as she gets up at night.  She has had a large amount of upper airway congestion recently.    She denies any chest pain or shortness of breath with exertion.    She has intermittent episodes of palpitations which do not occur with exertion.      She had a stress test and echocardiogram in 2022 which were normal.  Previous 2 week event monitor showed a 3.9% PVC burden.  She had symptoms during monitoring which corresponded to PVCs.  She had occasional SVT episodes as well.  Current medication regimen includes Toprol XL 50 mg daily.  Previously, she was using diltiazem.   Holter monitor was done on March 10, 2022 which showed frequent PVCs which totaled 16.8% of all monitored beats without any episodes of ventricular tachycardia.  There were 4 episodes of supraventricular tachycardia with the longest lasting for 5 beats.  Patient also had a stress test done which showed an EF of 71% with no ischemia or infarction noted.  A 2D echocardiogram showed ejection fraction of 55% with no valve  "disease.  TSH was normal.        Past Medical History:   Diagnosis Date    Arthritis     DJD- left thumb DJD,most joints    Borderline diabetes     Change in bowel habits     Chronic pain disorder     lumbar    Colon polyp     Constipation     unable to expell the stool completely with BM    COVID-19     in the winter    Deviated septum     Diabetes mellitus (HCC)     Diverticulosis     diverticulitis- with resection-colostomy then reversed    DJD (degenerative joint disease)     pt does get injections to the knee with Dr. Collazo    GERD (gastroesophageal reflux disease)     Glaucoma     Hearing aid worn     bilateral ears for background noise    Hyperlipidemia     Irregular heart beat     \"skips a beat\"    Low back pain     Lumbar degenerative disc disease 2019    Muscle spasm     legs    Peripheral neuropathy     PND (post-nasal drip)     Presence of upper and lower permanent dental bridges     Sciatica     Spinal stenosis     Wears glasses      Social History     Socioeconomic History    Marital status:      Spouse name: Not on file    Number of children: Not on file    Years of education: Not on file    Highest education level: Not on file   Occupational History    Not on file   Tobacco Use    Smoking status: Former     Current packs/day: 0.00     Types: Cigarettes     Quit date: 1976     Years since quittin.3    Smokeless tobacco: Never   Vaping Use    Vaping status: Never Used   Substance and Sexual Activity    Alcohol use: No    Drug use: Not Currently     Types: Marijuana     Comment: stopped in     Sexual activity: Not Currently     Partners: Male     Birth control/protection: Post-menopausal   Other Topics Concern    Not on file   Social History Narrative    Not on file     Social Determinants of Health     Financial Resource Strain: Not on file   Food Insecurity: Not on file   Transportation Needs: Not on file   Physical Activity: Not on file   Stress: Not on file " "  Social Connections: Not on file   Intimate Partner Violence: Not on file   Housing Stability: Not on file      Family History   Problem Relation Age of Onset    Heart disease Mother     Dementia Father     No Known Problems Sister     Diabetes type II Brother     No Known Problems Maternal Aunt     No Known Problems Maternal Uncle     No Known Problems Paternal Aunt     No Known Problems Paternal Uncle     No Known Problems Maternal Grandmother     No Known Problems Maternal Grandfather     No Known Problems Paternal Grandmother     No Known Problems Paternal Grandfather      Past Surgical History:   Procedure Laterality Date    BREAST SURGERY  2005    bilateral reduction    COLON SURGERY  2014    resection with colostomy-ruptured \"intestine\"    COLONOSCOPY N/A 07/11/2016    Procedure: COLONOSCOPY;  Surgeon: Sunny Wyman MD;  Location: [facility] GI LAB;  Service:     COLOSTOMY CLOSURE  2015    COMBINED REDUCTION MAMMAPLASTY W/ LIPOSUCTION      EPIDURAL BLOCK INJECTION N/A 11/15/2019    Procedure: L4 L5 Lumbar Epidural Steroid Injection (10243);  Surgeon: Sherman Dinero MD;  Location: [facility] MAIN OR;  Service: Pain Management     EPIDURAL BLOCK INJECTION N/A 03/12/2020    Procedure: L4 L5 Lumbar Epidural Steroid Injection (60255);  Surgeon: Sherman Dinero MD;  Location: [facility] MAIN OR;  Service: Pain Management     EPIDURAL BLOCK INJECTION N/A 07/23/2020    Procedure: L4-L5 LUMBAR EPIDURAL STEROID INJECTION (#2) (74311);  Surgeon: Sherman Dinero MD;  Location: [facility] MAIN OR;  Service: Pain Management     HERNIA MESH REMOVAL  06/22/2022    hernia repair, mesh removal reconstruction at [facility]    HERNIA REPAIR  2016    x3    CT XCAPSL CTRC RMVL INSJ IO LENS PROSTH W/O ECP Right 08/22/2022    Procedure: EXTRACTION EXTRACAPSULAR CATARACT PHACO INTRAOCULAR LENS (IOL);  Surgeon: Cristian Ly MD;  Location: [facility] MAIN OR;  Service: Ophthalmology    CT XCAPSL CTRC RMVL INSJ IO LENS PROSTH W/O ECP Left 10/24/2022    " Procedure: EXTRACTION EXTRACAPSULAR CATARACT PHACO INTRAOCULAR LENS (IOL);  Surgeon: Cristian Ly MD;  Location: [facility] MAIN OR;  Service: Ophthalmology    TUBAL LIGATION         Current Outpatient Medications:     ascorbic Acid (VITAMIN C) 500 MG CPCR, Take 1 capsule (500 mg total) by mouth 2 (two) times a day, Disp: 60 capsule, Rfl: 0    aspirin (ECOTRIN LOW STRENGTH) 81 mg EC tablet, 81 mg daily at bedtime, Disp: , Rfl:     atorvastatin (LIPITOR) 40 mg tablet, Take 1 tablet (40 mg total) by mouth daily, Disp: 90 tablet, Rfl: 0    azelastine (ASTELIN) 0.1 % nasal spray, 1 spray into each nostril 2 (two) times a day as needed for rhinitis Use in each nostril as directed, Disp: , Rfl:     b complex vitamins capsule, Take 1 capsule by mouth daily, Disp: , Rfl:     Blood Glucose Monitoring Suppl (ONE TOUCH ULTRA 2) w/Device KIT, , Disp: , Rfl:     GEE SEED PO, Take by mouth in the morning, Disp: , Rfl:     cholecalciferol (VITAMIN D3) 1,000 units tablet, Take 1 tablet (1,000 Units total) by mouth daily, Disp: 30 tablet, Rfl: 0    Coenzyme Q10 (Co Q 10) 100 MG CAPS, Take by mouth daily with lunch, Disp: , Rfl:     COMFORT EZ PEN NEEDLES 31G X 6 MM MISC, daily , Disp: , Rfl:     fluticasone (FLONASE) 50 mcg/act nasal spray, , Disp: , Rfl:     glucose blood (Contour Next Test) test strip, USE 1 EACH IN THE MORNING FOR 90 DOSES CHECK SUGAR ONCE DAILY, Disp: 100 strip, Rfl: 3    liraglutide (Victoza) injection, Inject 0.6 mg under the skin daily, Disp: , Rfl:     magnesium (MAGTAB) 84 MG (7MEQ) TBCR, Take 84 mg by mouth daily, Disp: , Rfl:     metoprolol succinate (TOPROL-XL) 50 mg 24 hr tablet, Take 1 tablet (50 mg total) by mouth daily at bedtime, Disp: 90 tablet, Rfl: 1    multivitamin (THERAGRAN) TABS, Take 1 tablet by mouth daily, Disp: , Rfl:     omeprazole (PriLOSEC) 40 MG capsule, , Disp: , Rfl:     ONE TOUCH CLUB LANCETS MISC, , Disp: , Rfl:     travoprost (Travatan Z) 0.004 % ophthalmic solution, , Disp: ,  "Rfl:     TURMERIC PO, Take by mouth in the morning, Disp: , Rfl:     zinc sulfate (ZINCATE) 220 mg capsule, Take 1 capsule (220 mg total) by mouth daily, Disp: 30 capsule, Rfl: 0    cyclobenzaprine (FLEXERIL) 10 mg tablet, , Disp: , Rfl:     diclofenac potassium (CATAFLAM) 50 mg tablet, , Disp: , Rfl:     diltiazem (CARDIZEM) 30 mg tablet, , Disp: , Rfl:     diltiazem (Dilt-XR) 120 MG 24 hr capsule, , Disp: , Rfl:     diltiazem (Dilt-XR) 180 MG 24 hr capsule, , Disp: , Rfl:     HYDROCODONE-APAP-DIETARY PROD PO, , Disp: , Rfl:     latanoprost (XALATAN) 0.005 % ophthalmic solution, Administer to both eyes daily at bedtime, Disp: , Rfl:     montelukast (SINGULAIR) 10 mg tablet, , Disp: , Rfl:     oxybutynin (DITROPAN-XL) 5 mg 24 hr tablet, Take 1 tablet (5 mg total) by mouth daily, Disp: 90 tablet, Rfl: 3    potassium chloride (MICRO-K) 10 MEQ CR capsule, Take 10 mEq by mouth daily (Patient not taking: Reported on 10/17/2024), Disp: , Rfl:     tiZANidine (ZANAFLEX) 2 mg tablet, , Disp: , Rfl:     VITAMIN D PO, Take by mouth in the morning, Disp: , Rfl:         Review of Systems:  Review of Systems   Constitutional:  Positive for fatigue.   HENT:  Positive for congestion, postnasal drip, rhinorrhea and sinus pressure.    Respiratory:  Positive for shortness of breath.    Cardiovascular:  Positive for palpitations. Negative for chest pain and leg swelling.   Musculoskeletal:  Positive for arthralgias and myalgias.   All other systems reviewed and are negative.        Physical Exam:  Vitals:  Vitals:    10/17/24 1305   BP: 114/82   BP Location: Right arm   Patient Position: Sitting   Cuff Size: Standard   Pulse: 78   SpO2: 97%   Weight: 92.5 kg (204 lb)   Height: 5' 4\" (1.626 m)     Physical Exam   Constitutional: She appears healthy. No distress.   Eyes: Pupils are equal, round, and reactive to light. Conjunctivae are normal.   Neck: No JVD present.   Cardiovascular: Normal rate, regular rhythm and normal heart sounds. " Exam reveals no gallop and no friction rub.   No murmur heard.  Pulmonary/Chest: Effort normal and breath sounds normal. She has no wheezes. She has no rales.   Musculoskeletal:         General: No tenderness, deformity or edema.      Cervical back: Normal range of motion and neck supple.   Neurological: She is alert and oriented to person, place, and time.   Skin: Skin is warm and dry.        Cardiographics:  EKG: Personally reviewed NSR   Last known EF: 55%    This note was completed in part utilizing M-Modal Fluency Direct Software.  Grammatical errors, random word insertions, spelling mistakes, and incomplete sentences can be an occasional consequence of this system secondary to software limitations, ambient noise, and hardware issues.  If you have any questions or concerns about the content, text, or information contained within the body of this dictation, please contact the provider for clarification.

## 2024-10-28 LAB
LEFT EYE DIABETIC RETINOPATHY: NORMAL
RIGHT EYE DIABETIC RETINOPATHY: NORMAL

## 2024-11-25 ENCOUNTER — TELEMEDICINE (OUTPATIENT)
Dept: ENDOCRINOLOGY | Facility: CLINIC | Age: 84
End: 2024-11-25
Payer: COMMERCIAL

## 2024-11-25 ENCOUNTER — TELEPHONE (OUTPATIENT)
Age: 84
End: 2024-11-25

## 2024-11-25 VITALS — HEIGHT: 64 IN | BODY MASS INDEX: 34.83 KG/M2 | WEIGHT: 204 LBS

## 2024-11-25 DIAGNOSIS — E55.9 VITAMIN D INSUFFICIENCY: Primary | ICD-10-CM

## 2024-11-25 DIAGNOSIS — Z13.220 SCREENING FOR LIPID DISORDERS: ICD-10-CM

## 2024-11-25 DIAGNOSIS — E11.42 TYPE 2 DIABETES MELLITUS WITH DIABETIC POLYNEUROPATHY, WITHOUT LONG-TERM CURRENT USE OF INSULIN (HCC): ICD-10-CM

## 2024-11-25 PROCEDURE — 99214 OFFICE O/P EST MOD 30 MIN: CPT | Performed by: NURSE PRACTITIONER

## 2024-11-25 RX ORDER — LIRAGLUTIDE 6 MG/ML
0.6 INJECTION SUBCUTANEOUS DAILY
Qty: 3 ML | Refills: 2 | Status: SHIPPED | OUTPATIENT
Start: 2024-11-25 | End: 2024-11-29

## 2024-11-25 NOTE — ASSESSMENT & PLAN NOTE
Lab Results   Component Value Date    HGBA1C 5.4 08/13/2024     HGA1C tightly controlled. Patient has intermittently been taking victoza 1.2 mg daily when she feels blood sugars are higher. Reviewed. Risks and side effects, denies all side effects. We also reviewed blood sugar normal ranges and she will maintain victoza 0.6 mg daily for now.     Discussed risks/complications associated with uncontrolled diabetes including organ involvement, heart attack, stroke, death.    Advised lifestyle modifications including attention to diet including the amount and types of carbohydrates consumed and regular activity.     Call for blood sugars less than 70 mg/dl or patterns over 250 mg/dl.     Monitor blood glucose levels at least 1-2 times a day     Recommendation for medical identification either bracelet, necklace.    Routine follow up for diabetic eye and foot exams.     Ordered blood work to complete prior to next visit.    Follow up in 3 months.       Orders:    Hemoglobin A1C; Future    Albumin / creatinine urine ratio; Future    Lipid panel; Future    Comprehensive metabolic panel; Future    liraglutide (Victoza) injection; Inject 0.1 mL (0.6 mg total) under the skin daily

## 2024-11-25 NOTE — TELEPHONE ENCOUNTER
Patient called because she hurt her knee and can't walk. She is asking if she can do a virtual appointment today.  Patient does not know how to go in her MYC so is ashing for a link to be texted to her if possible.    Please call patient back to advise.

## 2024-11-25 NOTE — PROGRESS NOTES
Virtual Brief Visit  Name: Keo Summers      : 1940      MRN: [id number]  Encounter Provider: JOSSE Sterling  Encounter Date: 2024   Encounter department: [facility]    This Visit is being completed by telephone. The Patient is located at Home and in the following state in which I hold an active license [place]    The patient was identified by name and date of birth. Keo Summers was informed that this is a telemedicine visit and that the visit is being conducted through Telephone.  My office door was closed. No one else was in the room.  She acknowledged consent and understanding of privacy and security of the video platform. The patient has agreed to participate and understands they can discontinue the visit at any time.    Patient is aware this is a billable service.     :  Assessment & Plan  Type 2 diabetes mellitus with diabetic polyneuropathy, without long-term current use of insulin ([facility])    Lab Results   Component Value Date    HGBA1C 5.4 2024     HGA1C tightly controlled. Patient has intermittently been taking victoza 1.2 mg daily when she feels blood sugars are higher. Reviewed. Risks and side effects, denies all side effects. We also reviewed blood sugar normal ranges and she will maintain victoza 0.6 mg daily for now.     Discussed risks/complications associated with uncontrolled diabetes including organ involvement, heart attack, stroke, death.    Advised lifestyle modifications including attention to diet including the amount and types of carbohydrates consumed and regular activity.     Call for blood sugars less than 70 mg/dl or patterns over 250 mg/dl.     Monitor blood glucose levels at least 1-2 times a day     Recommendation for medical identification either bracelet, necklace.    Routine follow up for diabetic eye and foot exams.     Ordered blood work to complete prior to next visit.    Follow up in 3 months.  "      Orders:    Hemoglobin A1C; Future    Albumin / creatinine urine ratio; Future    Lipid panel; Future    Comprehensive metabolic panel; Future    liraglutide (Victoza) injection; Inject 0.1 mL (0.6 mg total) under the skin daily    Vitamin D insufficiency  Patient unsure of current dose with history of low vitamin D levels.   Orders:    Vitamin D 25 hydroxy; Future    Screening for lipid disorders  Diabetes mellitus, recommend testing lipid panel.   Orders:    Lipid panel; Future        History of Present Illness   HPI    Keo Summers is an 84 year old female with a history of type 2 diabetes without long term use of insulin. Reports complications of neuropathy.     Denies recent illness or hospitalizations.      Denies recent severe hypoglycemic or severe hyperglycemic episodes.     Felt blood sugars were running \"too high\" during trial off of Victoza.      Home glucose monitoring: are performed regularly with lowest 148 at 9:13 pm and this morning 138 mg/dL     Current regimen:  Victoza 0.6 mg      Off of metformin, did not tolerate    No ACE/ARB on statin.      No history of thyroid disease  No history of pancreatitis    Review of Systems   See HPI.   All other systems reviewed and are negative.              "

## 2024-11-25 NOTE — ASSESSMENT & PLAN NOTE
Patient unsure of current dose with history of low vitamin D levels.   Orders:    Vitamin D 25 hydroxy; Future

## 2024-11-29 ENCOUNTER — TELEPHONE (OUTPATIENT)
Age: 84
End: 2024-11-29

## 2024-11-29 ENCOUNTER — APPOINTMENT (OUTPATIENT)
Dept: LAB | Facility: CLINIC | Age: 84
End: 2024-11-29
Payer: COMMERCIAL

## 2024-11-29 DIAGNOSIS — E11.42 TYPE 2 DIABETES MELLITUS WITH DIABETIC POLYNEUROPATHY, WITHOUT LONG-TERM CURRENT USE OF INSULIN (HCC): Primary | ICD-10-CM

## 2024-11-29 DIAGNOSIS — Z13.220 SCREENING FOR LIPID DISORDERS: ICD-10-CM

## 2024-11-29 DIAGNOSIS — E11.00 TYPE 2 DIABETES MELLITUS WITH HYPEROSMOLARITY WITHOUT COMA, WITHOUT LONG-TERM CURRENT USE OF INSULIN (HCC): ICD-10-CM

## 2024-11-29 DIAGNOSIS — E11.42 TYPE 2 DIABETES MELLITUS WITH DIABETIC POLYNEUROPATHY, WITHOUT LONG-TERM CURRENT USE OF INSULIN (HCC): ICD-10-CM

## 2024-11-29 DIAGNOSIS — R42 DIZZINESS: ICD-10-CM

## 2024-11-29 DIAGNOSIS — E55.9 VITAMIN D INSUFFICIENCY: ICD-10-CM

## 2024-11-29 LAB
25(OH)D3 SERPL-MCNC: 31.7 NG/ML (ref 30–100)
ALBUMIN SERPL BCG-MCNC: 4.1 G/DL (ref 3.5–5)
ALP SERPL-CCNC: 54 U/L (ref 34–104)
ALT SERPL W P-5'-P-CCNC: 21 U/L (ref 7–52)
ANION GAP SERPL CALCULATED.3IONS-SCNC: 5 MMOL/L (ref 4–13)
AST SERPL W P-5'-P-CCNC: 23 U/L (ref 13–39)
BILIRUB SERPL-MCNC: 0.62 MG/DL (ref 0.2–1)
BUN SERPL-MCNC: 15 MG/DL (ref 5–25)
CALCIUM SERPL-MCNC: 9.1 MG/DL (ref 8.4–10.2)
CHLORIDE SERPL-SCNC: 104 MMOL/L (ref 96–108)
CHOLEST SERPL-MCNC: 134 MG/DL (ref ?–200)
CO2 SERPL-SCNC: 29 MMOL/L (ref 21–32)
CORTIS AM PEAK SERPL-MCNC: 7.7 UG/DL (ref 6.7–22.6)
CREAT SERPL-MCNC: 0.59 MG/DL (ref 0.6–1.3)
CREAT UR-MCNC: 114.6 MG/DL
EST. AVERAGE GLUCOSE BLD GHB EST-MCNC: 120 MG/DL
GFR SERPL CREATININE-BSD FRML MDRD: 85 ML/MIN/1.73SQ M
GLUCOSE P FAST SERPL-MCNC: 98 MG/DL (ref 65–99)
HBA1C MFR BLD: 5.8 %
HDLC SERPL-MCNC: 65 MG/DL
LDLC SERPL CALC-MCNC: 51 MG/DL (ref 0–100)
MICROALBUMIN UR-MCNC: 27.7 MG/L
MICROALBUMIN/CREAT 24H UR: 24 MG/G CREATININE (ref 0–30)
NONHDLC SERPL-MCNC: 69 MG/DL
POTASSIUM SERPL-SCNC: 4.4 MMOL/L (ref 3.5–5.3)
PROT SERPL-MCNC: 6.9 G/DL (ref 6.4–8.4)
SODIUM SERPL-SCNC: 138 MMOL/L (ref 135–147)
T4 FREE SERPL-MCNC: 0.82 NG/DL (ref 0.61–1.12)
TRIGL SERPL-MCNC: 92 MG/DL (ref ?–150)
TSH SERPL DL<=0.05 MIU/L-ACNC: 2.6 UIU/ML (ref 0.45–4.5)

## 2024-11-29 PROCEDURE — 80053 COMPREHEN METABOLIC PANEL: CPT

## 2024-11-29 PROCEDURE — 82043 UR ALBUMIN QUANTITATIVE: CPT

## 2024-11-29 PROCEDURE — 36415 COLL VENOUS BLD VENIPUNCTURE: CPT

## 2024-11-29 PROCEDURE — 83036 HEMOGLOBIN GLYCOSYLATED A1C: CPT

## 2024-11-29 PROCEDURE — 82570 ASSAY OF URINE CREATININE: CPT

## 2024-11-29 PROCEDURE — 84443 ASSAY THYROID STIM HORMONE: CPT

## 2024-11-29 PROCEDURE — 80061 LIPID PANEL: CPT

## 2024-11-29 PROCEDURE — 84439 ASSAY OF FREE THYROXINE: CPT

## 2024-11-29 PROCEDURE — 82533 TOTAL CORTISOL: CPT

## 2024-11-29 PROCEDURE — 82306 VITAMIN D 25 HYDROXY: CPT

## 2024-11-29 NOTE — TELEPHONE ENCOUNTER
PA for Ozempic 0.25mg SUBMITTED to [email or web address]    via    []CMM-KEY:   [x]Surescripts-Case ID # [id number]   []Availity-Auth ID # NDC #   []Faxed to plan   []Other website   []Phone call Case ID #     [x]PA sent as URGENT    All office notes, labs and other pertaining documents and studies sent. Clinical questions answered. Awaiting determination from insurance company.     Turnaround time for your insurance to make a decision on your Prior Authorization can take 7-21 business days.

## 2024-11-29 NOTE — TELEPHONE ENCOUNTER
Pt gets victoza and the pharamcy has prescription. but insurance company not covering this medication. pt confused. she is not sure if its a prior auth that is needed. pt is out of medication     pt asking for a month prescription to pay in cash until she is able to find out why the insurance is not covering medication. .   pt asking to call pharmacy to understand what is going on. to see if pt can at least get a one month supply.

## 2024-11-29 NOTE — TELEPHONE ENCOUNTER
Please inform patient that Ozempic and Victoza works same in fact Ozempic is only once a week and has better results than Victoza in terms of blood sugar control as well as weight loss.  If she is still does not want to change from Victoza to Ozempic.  She should discuss with Dr. Galindo when she is back if appeal can be done

## 2024-11-29 NOTE — TELEPHONE ENCOUNTER
I called and spoke with the pharmacy and they stated that Victoza is no longer cover by the patient insurance. They stated that the new formulary is ozempic. Pharmacist told me the patient do not want to try anything else. We did submit to the prior Auth team and also call the patient to inform her that the out of pocket cost is going to be $900. Please advise thank you

## 2024-11-29 NOTE — TELEPHONE ENCOUNTER
PA for Victoza  NOT REQUIRED        Pharmacy advised by    [x]Phone call    Victoza is out of stock until next year per the pharmacy and Generic not covered under plan

## 2024-11-29 NOTE — TELEPHONE ENCOUNTER
Please inform patient [facility] however it is going to need prior authorization which may take another 2 to 3 weeks.  During this time she can get some samples from the office on Monday

## 2024-11-29 NOTE — TELEPHONE ENCOUNTER
PA for  (Victoza) injectionSUBMITTED to [facility]     via    [x]CMM-KEY: [id number]      [x]PA sent as URGENT    All office notes, labs and other pertaining documents and studies sent. Clinical questions answered. Awaiting determination from insurance company.     Turnaround time for your insurance to make a decision on your Prior Authorization can take 7-21 business days.

## 2024-11-29 NOTE — TELEPHONE ENCOUNTER
Pharmacy called stating the patients Victoza requires a prior authorization.   The patient is out of medication and pharmacy asked if this can be submitted as urgent.  Please advise

## 2024-11-29 NOTE — TELEPHONE ENCOUNTER
I called and spoke with patient and she stated that she that she would like to try the Ozempic to please put in the order for she can have some medication for the weekend. Thank you

## 2024-12-02 ENCOUNTER — RESULTS FOLLOW-UP (OUTPATIENT)
Dept: ENDOCRINOLOGY | Facility: CLINIC | Age: 84
End: 2024-12-02

## 2024-12-02 NOTE — TELEPHONE ENCOUNTER
I called the patient this morning and left her a voicemail to informed her that Dr. Hill sent the patient Ozempic to the pharmacy on Friday. MARIA LUZ Thank you

## 2024-12-03 NOTE — TELEPHONE ENCOUNTER
Patient is requesting a call from the clinical team to go over the medication since it is new to her. She prefers a call in the morning and if she does not answer, she requested a detailed message - for example, patient asking if she should take injection before or after a meal          PA for Ozempic 0.25 APPROVED     Date(s) approved 08/01/2024-12/31/2025      Patient advised by          [][employer] Message  [x]Phone call   []LMOM  []L/M to call office as no active Communication consent on file  []Unable to leave detailed message as VM not approved on Communication consent       Pharmacy advised by    [x]Fax  []Phone call    Approval letter scanned into [employer] Yes

## 2025-01-04 NOTE — TELEPHONE ENCOUNTER
Pt called to cancel their apt for tomorrow as she is sick  Pt will call back to reschedule  (M6) obeys commands

## 2025-01-12 DIAGNOSIS — I10 PRIMARY HYPERTENSION: ICD-10-CM

## 2025-01-12 DIAGNOSIS — E78.5 DYSLIPIDEMIA: ICD-10-CM

## 2025-01-12 DIAGNOSIS — I49.9 IRREGULAR HEART BEAT: ICD-10-CM

## 2025-01-14 RX ORDER — METOPROLOL SUCCINATE 50 MG/1
50 TABLET, EXTENDED RELEASE ORAL
Qty: 90 TABLET | Refills: 0 | Status: SHIPPED | OUTPATIENT
Start: 2025-01-14 | End: 2025-04-14

## 2025-01-14 RX ORDER — ATORVASTATIN CALCIUM 40 MG/1
40 TABLET, FILM COATED ORAL DAILY
Qty: 90 TABLET | Refills: 1 | Status: SHIPPED | OUTPATIENT
Start: 2025-01-14

## 2025-02-14 ENCOUNTER — TELEPHONE (OUTPATIENT)
Dept: ENDOCRINOLOGY | Facility: CLINIC | Age: 85
End: 2025-02-14

## 2025-02-17 ENCOUNTER — TELEPHONE (OUTPATIENT)
Dept: ENDOCRINOLOGY | Facility: CLINIC | Age: 85
End: 2025-02-17

## 2025-02-17 NOTE — TELEPHONE ENCOUNTER
Patient called in and asked to be changed to Mounjaro. She advised her insurance will only cover that. She asks if we can call into [facility] as she is due for it.

## 2025-02-17 NOTE — TELEPHONE ENCOUNTER
Please see above message. Patient is requesting new script for Mounjaro to be sent to [facility]. Please advise, thank you.

## 2025-02-17 NOTE — TELEPHONE ENCOUNTER
Patient called in stating that [facility] is saying that her insurance will not cover Ozempic but will cover Mounjaro.    Telephone encounter from 11/29/24 shows that the Ozempic has been approved until 12/31/2025.      She was suppose to have her Ozempic this past Friday.    Please advise. Patient would like us to call the pharmacy for her asap and see what is going on.

## 2025-02-18 ENCOUNTER — TELEPHONE (OUTPATIENT)
Dept: ENDOCRINOLOGY | Facility: CLINIC | Age: 85
End: 2025-02-18

## 2025-02-18 DIAGNOSIS — E11.42 TYPE 2 DIABETES MELLITUS WITH DIABETIC POLYNEUROPATHY, WITHOUT LONG-TERM CURRENT USE OF INSULIN (HCC): Primary | ICD-10-CM

## 2025-02-18 RX ORDER — TIRZEPATIDE 2.5 MG/.5ML
2.5 INJECTION, SOLUTION SUBCUTANEOUS WEEKLY
Qty: 2 ML | Refills: 2 | Status: SHIPPED | OUTPATIENT
Start: 2025-02-18

## 2025-02-18 NOTE — TELEPHONE ENCOUNTER
Mounjaro is not on patients active medication list, if patient is to be on Mounjaro please prescribe rx and send message back to the Prior Authorization Team so a prior authorization can be submitted. Thank you

## 2025-02-19 ENCOUNTER — TELEPHONE (OUTPATIENT)
Dept: ENDOCRINOLOGY | Facility: CLINIC | Age: 85
End: 2025-02-19

## 2025-02-19 NOTE — PROGRESS NOTES
Patient called in asking if Mounjaro was sent to \[facility]\"" pharmacy. I confirmed that the Rx was sent yesterday. She verbalized her understanding.

## 2025-02-20 ENCOUNTER — TELEPHONE (OUTPATIENT)
Age: 85
End: 2025-02-20

## 2025-02-20 NOTE — TELEPHONE ENCOUNTER
PA for Mounjaro 2.5mg SUBMITTED to [employer] [facility] [place]     via    []CMM-KEY:   [x]Surescripts-Case ID #   []Availity-Auth ID # NDC #   []Faxed to plan   []Other website   []Phone call Case ID #     [x]PA sent as URGENT    All office notes, labs and other pertaining documents and studies sent. Clinical questions answered. Awaiting determination from insurance company.     Turnaround time for your insurance to make a decision on your Prior Authorization can take 7-21 business days.

## 2025-02-21 NOTE — TELEPHONE ENCOUNTER
PA for Mounjaro 2.5mg APPROVED     Date(s) approved 02/01/2025-02/20/2026      Patient advised by          []MyChart Message  []Phone call   [x]LMOM  []L/M to call office as no active Communication consent on file  []Unable to leave detailed message as VM not approved on Communication consent       Pharmacy advised by    [x]Fax  []Phone call    [facility]    []     Approval letter scanned into [employer] Yes

## 2025-02-24 DIAGNOSIS — E11.00 TYPE 2 DIABETES MELLITUS WITH HYPEROSMOLARITY WITHOUT COMA, WITHOUT LONG-TERM CURRENT USE OF INSULIN (HCC): ICD-10-CM

## 2025-03-24 ENCOUNTER — OFFICE VISIT (OUTPATIENT)
Dept: FAMILY MEDICINE CLINIC | Facility: CLINIC | Age: 85
End: 2025-03-24
Payer: COMMERCIAL

## 2025-03-24 VITALS
RESPIRATION RATE: 18 BRPM | TEMPERATURE: 96 F | HEIGHT: 62 IN | OXYGEN SATURATION: 99 % | WEIGHT: 203.8 LBS | BODY MASS INDEX: 37.5 KG/M2 | DIASTOLIC BLOOD PRESSURE: 78 MMHG | HEART RATE: 74 BPM | SYSTOLIC BLOOD PRESSURE: 118 MMHG

## 2025-03-24 DIAGNOSIS — E55.9 VITAMIN D INSUFFICIENCY: ICD-10-CM

## 2025-03-24 DIAGNOSIS — E78.5 DYSLIPIDEMIA: ICD-10-CM

## 2025-03-24 DIAGNOSIS — E11.42 TYPE 2 DIABETES MELLITUS WITH DIABETIC POLYNEUROPATHY, WITHOUT LONG-TERM CURRENT USE OF INSULIN (HCC): ICD-10-CM

## 2025-03-24 DIAGNOSIS — E66.01 OBESITY, MORBID (HCC): ICD-10-CM

## 2025-03-24 DIAGNOSIS — I10 BENIGN ESSENTIAL HYPERTENSION: Primary | ICD-10-CM

## 2025-03-24 DIAGNOSIS — F41.9 ANXIETY: ICD-10-CM

## 2025-03-24 DIAGNOSIS — G89.4 CHRONIC PAIN SYNDROME: ICD-10-CM

## 2025-03-24 DIAGNOSIS — K21.9 GASTROESOPHAGEAL REFLUX DISEASE, UNSPECIFIED WHETHER ESOPHAGITIS PRESENT: ICD-10-CM

## 2025-03-24 PROBLEM — U07.1 COVID-19: Status: RESOLVED | Noted: 2021-01-04 | Resolved: 2025-03-24

## 2025-03-24 PROCEDURE — 99204 OFFICE O/P NEW MOD 45 MIN: CPT | Performed by: NURSE PRACTITIONER

## 2025-03-24 NOTE — ASSESSMENT & PLAN NOTE
Continue current meds. Monitor.   Orders:    CBC and differential; Future    Comprehensive metabolic panel; Future

## 2025-03-24 NOTE — ASSESSMENT & PLAN NOTE
Stable. Continue blood pressure medications as ordered.   Monitor blood pressure. Stress management. Regular exercise  Limit salt in diet.   Orders:    CBC and differential; Future    Comprehensive metabolic panel; Future

## 2025-03-24 NOTE — ASSESSMENT & PLAN NOTE
Weight loss is recommended to improve overall health.   Dietary changes- limit carbohydrates, decrease overall caloric intake, reduce portion sizes, healthier snack choices, limit saturated fats, increase intake of fruits and vegetables, limit junk food.   exercise to 3-5 times per week. Consider adding strength exercises to exercise routine.

## 2025-03-24 NOTE — PROGRESS NOTES
Name: Keo Summers      : 1940      MRN: [id number]  Encounter Provider: JOSSE Varela  Encounter Date: 3/24/2025   Encounter department: [facility]  :  Assessment & Plan  Benign essential hypertension  Stable. Continue blood pressure medications as ordered.   Monitor blood pressure. Stress management. Regular exercise  Limit salt in diet.   Orders:    CBC and differential; Future    Comprehensive metabolic panel; Future    Obesity, morbid (HCC)  Weight loss is recommended to improve overall health.   Dietary changes- limit carbohydrates, decrease overall caloric intake, reduce portion sizes, healthier snack choices, limit saturated fats, increase intake of fruits and vegetables, limit junk food.   exercise to 3-5 times per week. Consider adding strength exercises to exercise routine.            Type 2 diabetes mellitus with diabetic polyneuropathy, without long-term current use of insulin (HCC)  Lab Results   Component Value Date    HGBA1C 5.8 (H) 2024   Managed by endocrine.   Carb controlled diet.   Orders:    CBC and differential; Future    Comprehensive metabolic panel; Future    Hemoglobin A1C; Future    Lipid panel; Future    Gastroesophageal reflux disease, unspecified whether esophagitis present  Continue current meds. Monitor.   Orders:    CBC and differential; Future    Comprehensive metabolic panel; Future    Anxiety  Stress management. Activities to divert attention when possible.   Conscious breathing techniques as discussed.   Coping mechanisms and strategies vary from person to person so try to utilize strategies that you think may work for you (such as meditation, music, etc. ).       Vitamin D insufficiency  Continue otc supplementation. Will check labs.   Orders:    CBC and differential; Future    Comprehensive metabolic panel; Future    Chronic pain syndrome  May benefit from referral to pain management.   Will re-visit at next appt  "    Dyslipidemia  Heart healthy diet. Statin.   Orders:    Lipid panel; Future           History of Present Illness   Pt here to establish care.   PMH, PSH, meds, allergies, SH, FH reviewed.   History: reviewed  SH:  lives with alone, retired , non smoker, former- quit age 35, no etoh  Current medications: reviewed  Current issues include:   DM managed by endocrine  OAB, managed by urology  Follows with cardio, ortho, ophthalmology, vascular, gastro  Not sleeping well. Wakes up to urinate frequently.       Review of Systems   Constitutional:  Positive for fatigue. Negative for unexpected weight change.   HENT:  Positive for postnasal drip. Negative for congestion, sinus pressure and sinus pain.    Eyes:  Positive for visual disturbance.   Respiratory:  Negative for cough and shortness of breath.    Cardiovascular:  Negative for chest pain and palpitations.   Gastrointestinal:  Negative for abdominal pain, diarrhea, nausea and vomiting.   Genitourinary:  Positive for frequency.   Musculoskeletal:  Positive for arthralgias, back pain and neck pain.   Neurological:  Negative for dizziness and headaches (more recent).   Psychiatric/Behavioral:  Positive for sleep disturbance. Negative for dysphoric mood. The patient is nervous/anxious.        Objective   /78   Pulse 74   Temp (!) 96 °F (35.6 °C)   Resp 18   Ht 5' 2.25\" (1.581 m)   Wt 92.4 kg (203 lb 12.8 oz)   SpO2 99%   BMI 36.98 kg/m²      Physical Exam  Vitals reviewed.   Constitutional:       General: She is not in acute distress.     Appearance: She is obese. She is not ill-appearing.   Neck:      Vascular: No carotid bruit.   Cardiovascular:      Rate and Rhythm: Regular rhythm.   Pulmonary:      Effort: Pulmonary effort is normal. No respiratory distress.      Breath sounds: Normal breath sounds. No wheezing or rales.   Abdominal:      General: There is no distension.      Palpations: Abdomen is soft.   Musculoskeletal:      Cervical back: Normal " range of motion.   Skin:     General: Skin is warm and dry.      Coloration: Skin is not jaundiced or pale.   Neurological:      General: No focal deficit present.      Mental Status: She is alert and oriented to person, place, and time.   Psychiatric:         Mood and Affect: Mood normal.         Behavior: Behavior normal.         Thought Content: Thought content normal.         Judgment: Judgment normal.

## 2025-03-24 NOTE — ASSESSMENT & PLAN NOTE
Continue otc supplementation. Will check labs.   Orders:    CBC and differential; Future    Comprehensive metabolic panel; Future

## 2025-03-24 NOTE — ASSESSMENT & PLAN NOTE
Stress management. Activities to divert attention when possible.   Conscious breathing techniques as discussed.   Coping mechanisms and strategies vary from person to person so try to utilize strategies that you think may work for you (such as meditation, music, etc. ).

## 2025-03-24 NOTE — ASSESSMENT & PLAN NOTE
Lab Results   Component Value Date    HGBA1C 5.8 (H) 11/29/2024   Managed by endocrine.   Carb controlled diet.   Orders:    CBC and differential; Future    Comprehensive metabolic panel; Future    Hemoglobin A1C; Future    Lipid panel; Future

## 2025-03-24 NOTE — PATIENT INSTRUCTIONS
Follow up with urology to discuss options for treatment of overactive bladder.   Follow up with specialists as scheduled  Continue same medications.

## 2025-03-25 ENCOUNTER — TELEPHONE (OUTPATIENT)
Dept: ADMINISTRATIVE | Facility: OTHER | Age: 85
End: 2025-03-25

## 2025-03-25 NOTE — TELEPHONE ENCOUNTER
----- Message from Ghazala LIANG sent at 3/24/2025  2:39 PM EDT -----  03/24/25 2:40 PM    Hello, our patient Keo Summers has had Diabetic Eye Exam completed/performed. Please assist in updating the patient chart by making an External outreach to Dr Mckeon facility located in [facility] . The date of service is 2024.    Thank you,  Ghazala Mabry MA   [facility]

## 2025-03-25 NOTE — LETTER
Diabetic Eye Exam Form    Date Requested: 25     2nd Request  Patient: Keo Summers     Please complete form  Patient : 1940   Referring Provider: JOSSE Varela      DIABETIC Eye Exam Date _______________________________      Type of Exam MUST be documented for Diabetic Eye Exams. Please CHECK ONE.     Retinal Exam       Dilated Retinal Exam       OCT       Optomap-Iris Exam      Fundus Photography       Left Eye - Please check Retinopathy or No Retinopathy        Exam did show retinopathy    Exam did not show retinopathy       Right Eye - Please check Retinopathy or No Retinopathy       Exam did show retinopathy    Exam did not show retinopathy       Comments __________________________________________________________    Practice Providing Exam ______________________________________________    Exam Performed By (print name) _______________________________________      Provider Signature ___________________________________________________      These reports are needed for  compliance.    Please fax this completed form and a copy of the Diabetic Eye Exam report to the [facility] Based Department as soon as possible via Fax [phone], attention Karthik: Phone [phone]. Our office is located at [address] [place] [address].     We thank you for your assistance in treating our mutual patient.

## 2025-03-25 NOTE — LETTER
Diabetic Eye Exam Form    Date Requested: 25     Please complete form  Patient: Keo Summers  Patient : 1940   Referring Provider: JOSSE Varela      DIABETIC Eye Exam Date _______________________________      Type of Exam MUST be documented for Diabetic Eye Exams. Please CHECK ONE.     Retinal Exam       Dilated Retinal Exam       OCT       Optomap-Iris Exam      Fundus Photography       Left Eye - Please check Retinopathy or No Retinopathy        Exam did show retinopathy    Exam did not show retinopathy       Right Eye - Please check Retinopathy or No Retinopathy       Exam did show retinopathy    Exam did not show retinopathy       Comments __________________________________________________________    Practice Providing Exam ______________________________________________    Exam Performed By (print name) _______________________________________      Provider Signature ___________________________________________________      These reports are needed for  compliance.    Please fax this completed form and a copy of the Diabetic Eye Exam report to the [facility] Based Department as soon as possible via Fax [phone], attention Karthik: Phone [phone]. Our office is located at [address] [place] [address].     We thank you for your assistance in treating our mutual patient.

## 2025-03-27 ENCOUNTER — OFFICE VISIT (OUTPATIENT)
Dept: ENDOCRINOLOGY | Facility: CLINIC | Age: 85
End: 2025-03-27
Payer: COMMERCIAL

## 2025-03-27 VITALS
SYSTOLIC BLOOD PRESSURE: 122 MMHG | OXYGEN SATURATION: 97 % | WEIGHT: 203.6 LBS | DIASTOLIC BLOOD PRESSURE: 83 MMHG | HEIGHT: 62 IN | BODY MASS INDEX: 37.47 KG/M2 | HEART RATE: 67 BPM

## 2025-03-27 DIAGNOSIS — E11.69 TYPE 2 DIABETES MELLITUS WITH OTHER SPECIFIED COMPLICATION, WITHOUT LONG-TERM CURRENT USE OF INSULIN (HCC): ICD-10-CM

## 2025-03-27 DIAGNOSIS — E55.9 VITAMIN D INSUFFICIENCY: Primary | ICD-10-CM

## 2025-03-27 DIAGNOSIS — R20.9 UNSPECIFIED DISTURBANCES OF SKIN SENSATION: ICD-10-CM

## 2025-03-27 LAB — SL AMB POCT HEMOGLOBIN AIC: 5.3 (ref ?–6.5)

## 2025-03-27 PROCEDURE — 83036 HEMOGLOBIN GLYCOSYLATED A1C: CPT | Performed by: NURSE PRACTITIONER

## 2025-03-27 PROCEDURE — 99214 OFFICE O/P EST MOD 30 MIN: CPT | Performed by: NURSE PRACTITIONER

## 2025-03-27 NOTE — PROGRESS NOTES
Name: Keo Summers      : 1940      MRN: [id number]  Encounter Provider: JOSSE Sterling  Encounter Date: 3/27/2025   Encounter department: [facility]    No chief complaint on file.  :  Assessment & Plan  Vitamin D insufficiency  Continues on vitamin D3 supplementation.   Orders:    Vitamin D 25 hydroxy; Future    Type 2 diabetes mellitus with other specified complication, without long-term current use of insulin ([facility])    Lab Results   Component Value Date    HGBA1C 5.3 2025     HGA1C at goal. Continue current dose of Mounjaro 2.5 mg weekly.     Discussed risks/complications associated with uncontrolled diabetes including organ involvement, heart attack, stroke, death.    Advised lifestyle modifications including attention to diet including the amount and types of carbohydrates consumed and regular activity.     Call for blood sugars less than 70 mg/dl or patterns over 250 mg/dl.     Discussed symptoms and treatment of hypoglycemia.  Reviewed risks associated with hypoglycemia. Always carry rapid acting carbohydrates and a glucometer (a way to check your blood sugar).    Recommendation for medical identification either bracelet, necklace.    Routine follow up for diabetic eye and foot exams.     Ordered blood work to complete prior to next visit.    Follow up in 3 months.       Orders:    POCT hemoglobin A1c    Albumin / creatinine urine ratio; Future    Comprehensive metabolic panel; Future    Lipid panel; Future    Vitamin B12; Future    Unspecified disturbances of skin sensation  Will check vitamin B 12 levels.   Orders:    Vitamin B12; Future        History of Present Illness     Keo Summers is a 84 y.o. female with a history of type 2 diabetes without long term use of insulin. Reports complications of neuropathy.     Denies recent illness or hospitalizations.     Has been struggling with poor sleep given nocturia with OAB.      Denies  recent severe hypoglycemic or severe hyperglycemic episodes.     Home glucose monitoring: are performed regularly, no glucose logs for review at today's appointment.      Current regimen:  Mounjaro 2.5 mg weekly    Denies side effects including abdominal pain, nausea, vomiting, diarrhea, constipation, or severe appetite suppression.     Off of metformin, did not tolerate     No ACE/ARB.    Currently on statin.    No history of thyroid disease  No history of pancreatitis     Review of Systems as per HPI  Medical History Reviewed by provider this encounter:  Tobacco  Allergies  Meds  Problems  Med Hx  Surg Hx  Fam Hx     .  Current Outpatient Medications on File Prior to Visit   Medication Sig Dispense Refill    ascorbic Acid (VITAMIN C) 500 MG CPCR Take 1 capsule (500 mg total) by mouth 2 (two) times a day 60 capsule 0    aspirin (ECOTRIN LOW STRENGTH) 81 mg EC tablet 81 mg daily at bedtime      atorvastatin (LIPITOR) 40 mg tablet TAKE 1 TABLET (40 MG TOTAL) BY MOUTH DAILY 90 tablet 1    azelastine (ASTELIN) 0.1 % nasal spray 1 spray into each nostril 2 (two) times a day as needed for rhinitis Use in each nostril as directed      b complex vitamins capsule Take 1 capsule by mouth daily      Blood Glucose Monitoring Suppl (ONE TOUCH ULTRA 2) w/Device KIT       GEE SEED PO Take by mouth in the morning      cholecalciferol (VITAMIN D3) 1,000 units tablet Take 1 tablet (1,000 Units total) by mouth daily 30 tablet 0    Coenzyme Q10 (Co Q 10) 100 MG CAPS Take by mouth daily with lunch      COMFORT EZ PEN NEEDLES 31G X 6 MM MISC daily       fluticasone (FLONASE) 50 mcg/act nasal spray       glucose blood (Contour Next Test) test strip USE 1 EACH IN THE MORNING FOR 90 DOSES CHECK SUGAR ONCE DAILY 100 each 1    latanoprost (XALATAN) 0.005 % ophthalmic solution Administer to both eyes daily at bedtime      magnesium (MAGTAB) 84 MG (7MEQ) TBCR Take 84 mg by mouth daily      metoprolol succinate (TOPROL-XL) 50 mg 24 hr  "tablet TAKE 1 TABLET (50 MG TOTAL) BY MOUTH DAILY AT BEDTIME 90 tablet 0    multivitamin (THERAGRAN) TABS Take 1 tablet by mouth daily      omeprazole (PriLOSEC) 40 MG capsule       ONE TOUCH CLUB LANCETS MISC       Tirzepatide (Mounjaro) 2.5 MG/0.5ML SOAJ Inject 2.5 mg under the skin once a week 2 mL 2    travoprost (Travatan Z) 0.004 % ophthalmic solution       TURMERIC PO Take by mouth in the morning      zinc sulfate (ZINCATE) 220 mg capsule Take 1 capsule (220 mg total) by mouth daily 30 capsule 0     No current facility-administered medications on file prior to visit.         Medical History Reviewed by provider this encounter:     .    Objective   /83 (BP Location: Left arm, Patient Position: Sitting, Cuff Size: Large)   Pulse 67   Ht 5' 2.25\" (1.581 m)   Wt 92.4 kg (203 lb 9.6 oz)   SpO2 97%   BMI 36.94 kg/m²      Body mass index is 36.94 kg/m².  Wt Readings from Last 3 Encounters:   03/27/25 92.4 kg (203 lb 9.6 oz)   03/24/25 92.4 kg (203 lb 12.8 oz)   11/25/24 92.5 kg (204 lb)        Physical Exam  Vitals reviewed.   Constitutional:       Appearance: Normal appearance.   Cardiovascular:      Rate and Rhythm: Normal rate and regular rhythm.      Pulses: Normal pulses.      Heart sounds: Normal heart sounds.   Pulmonary:      Effort: Pulmonary effort is normal.      Breath sounds: Normal breath sounds.   Skin:     General: Skin is warm and dry.      Capillary Refill: Capillary refill takes less than 2 seconds.   Neurological:      General: No focal deficit present.      Mental Status: She is alert and oriented to person, place, and time.   Psychiatric:         Mood and Affect: Mood normal.         Behavior: Behavior normal.       Labs:   Lab Results   Component Value Date    HGBA1C 5.8 (H) 11/29/2024    HGBA1C 5.4 08/13/2024    HGBA1C 5.3 04/04/2024     Lab Results   Component Value Date    CREATININE 0.59 (L) 11/29/2024    CREATININE 0.64 02/16/2021    CREATININE 0.87 01/01/2021    BUN 15 " 11/29/2024     10/18/2013    K 4.4 11/29/2024     11/29/2024    CO2 29 11/29/2024     eGFR   Date Value Ref Range Status   11/29/2024 85 ml/min/1.73sq m Final     Lab Results   Component Value Date    HDL 65 11/29/2024    TRIG 92 11/29/2024     Lab Results   Component Value Date    ALT 21 11/29/2024    AST 23 11/29/2024    ALKPHOS 54 11/29/2024    BILITOT 0.6 10/18/2013     Lab Results   Component Value Date    ZMW2UNVKVRML 2.604 11/29/2024    OQE9TDRCUTUT 2.441 04/04/2016    MJK8OERAAFIZ 2.07 10/18/2013     Lab Results   Component Value Date    FREET4 0.82 11/29/2024       There are no Patient Instructions on file for this visit.    Discussed with the patient and all questioned fully answered. She will call me if any problems arise.    Administrative Statements

## 2025-03-28 ENCOUNTER — TELEPHONE (OUTPATIENT)
Dept: FAMILY MEDICINE CLINIC | Facility: CLINIC | Age: 85
End: 2025-03-28

## 2025-03-28 DIAGNOSIS — I10 BENIGN ESSENTIAL HYPERTENSION: Primary | ICD-10-CM

## 2025-03-28 DIAGNOSIS — E55.9 VITAMIN D INSUFFICIENCY: ICD-10-CM

## 2025-03-28 DIAGNOSIS — E11.42 TYPE 2 DIABETES MELLITUS WITH DIABETIC POLYNEUROPATHY, WITHOUT LONG-TERM CURRENT USE OF INSULIN (HCC): ICD-10-CM

## 2025-03-28 NOTE — TELEPHONE ENCOUNTER
Spoke to patient and reminded her that lab orders in chart need to be completed prior to her 4/1 appt

## 2025-03-28 NOTE — TELEPHONE ENCOUNTER
Patient Name: Naa Johnson  Caller Name: Naa Baez  Name of Facility: n/a  Callback Number: [phone] (H)  Best Availability: Any time.  Can A Detailed Message Be left? yes  Fax Number: n/a  Additional Info: Patient is calling in regard to the concern below, and is asking that her prescription please be sent right away. Patient has been out of insulin for three days, and is not feeling well. Please advise.   Did you confirm the message with the caller?: yes    Thank you,  Irving Goode     Pt called back. She said she can have the labs done right at [facility], but the lab work has to be sent through [facility] and they will cover her bill

## 2025-03-28 NOTE — TELEPHONE ENCOUNTER
As a follow-up, a second attempt has been made for outreach via fax to facility. Please see Contacts section for details.    Thank you  Karthik Olsen MA

## 2025-03-28 NOTE — TELEPHONE ENCOUNTER
----- Message from JOSSE Varela sent at 3/28/2025  8:57 AM EDT -----  Regarding: labs  Pt has upcoming appt for tequila LESTER, lab review on 4/1. Needs to have labs done prior to appt. Orders in chart.   Please call pt to advise.

## 2025-04-08 LAB
ALBUMIN SERPL-MCNC: 4.3 G/DL (ref 3.7–4.7)
ALP SERPL-CCNC: 67 IU/L (ref 44–121)
ALT SERPL-CCNC: 25 IU/L (ref 0–32)
AST SERPL-CCNC: 34 IU/L (ref 0–40)
BASOPHILS # BLD AUTO: 0.1 X10E3/UL (ref 0–0.2)
BASOPHILS NFR BLD AUTO: 1 %
BILIRUB SERPL-MCNC: 0.6 MG/DL (ref 0–1.2)
BUN SERPL-MCNC: 13 MG/DL (ref 8–27)
BUN/CREAT SERPL: 21 (ref 12–28)
CALCIUM SERPL-MCNC: 9.6 MG/DL (ref 8.7–10.3)
CHLORIDE SERPL-SCNC: 105 MMOL/L (ref 96–106)
CHOLEST SERPL-MCNC: 159 MG/DL (ref 100–199)
CHOLEST/HDLC SERPL: 2.1 RATIO (ref 0–4.4)
CO2 SERPL-SCNC: 25 MMOL/L (ref 20–29)
CREAT SERPL-MCNC: 0.62 MG/DL (ref 0.57–1)
EGFR: 88 ML/MIN/1.73
EOSINOPHIL # BLD AUTO: 0.2 X10E3/UL (ref 0–0.4)
EOSINOPHIL NFR BLD AUTO: 4 %
ERYTHROCYTE [DISTWIDTH] IN BLOOD BY AUTOMATED COUNT: 13.8 % (ref 11.7–15.4)
EST. AVERAGE GLUCOSE BLD GHB EST-MCNC: 111 MG/DL
GLOBULIN SER-MCNC: 2.8 G/DL (ref 1.5–4.5)
GLUCOSE SERPL-MCNC: 89 MG/DL (ref 70–99)
HBA1C MFR BLD: 5.5 % (ref 4.8–5.6)
HCT VFR BLD AUTO: 40.3 % (ref 34–46.6)
HDLC SERPL-MCNC: 76 MG/DL
HGB BLD-MCNC: 13.4 G/DL (ref 11.1–15.9)
IMM GRANULOCYTES # BLD: 0 X10E3/UL (ref 0–0.1)
IMM GRANULOCYTES NFR BLD: 0 %
LDLC SERPL CALC-MCNC: 65 MG/DL (ref 0–99)
LYMPHOCYTES # BLD AUTO: 1.8 X10E3/UL (ref 0.7–3.1)
LYMPHOCYTES NFR BLD AUTO: 27 %
MCH RBC QN AUTO: 29.8 PG (ref 26.6–33)
MCHC RBC AUTO-ENTMCNC: 33.3 G/DL (ref 31.5–35.7)
MCV RBC AUTO: 90 FL (ref 79–97)
MONOCYTES # BLD AUTO: 0.7 X10E3/UL (ref 0.1–0.9)
MONOCYTES NFR BLD AUTO: 10 %
NEUTROPHILS # BLD AUTO: 3.8 X10E3/UL (ref 1.4–7)
NEUTROPHILS NFR BLD AUTO: 58 %
PLATELET # BLD AUTO: 205 X10E3/UL (ref 150–450)
POTASSIUM SERPL-SCNC: 4.9 MMOL/L (ref 3.5–5.2)
PROT SERPL-MCNC: 7.1 G/DL (ref 6–8.5)
RBC # BLD AUTO: 4.5 X10E6/UL (ref 3.77–5.28)
SL AMB VLDL CHOLESTEROL CALC: 18 MG/DL (ref 5–40)
SODIUM SERPL-SCNC: 144 MMOL/L (ref 134–144)
TRIGL SERPL-MCNC: 98 MG/DL (ref 0–149)
WBC # BLD AUTO: 6.5 X10E3/UL (ref 3.4–10.8)

## 2025-04-10 ENCOUNTER — OFFICE VISIT (OUTPATIENT)
Dept: CARDIOLOGY CLINIC | Facility: CLINIC | Age: 85
End: 2025-04-10
Payer: COMMERCIAL

## 2025-04-10 ENCOUNTER — TELEPHONE (OUTPATIENT)
Dept: FAMILY MEDICINE CLINIC | Facility: CLINIC | Age: 85
End: 2025-04-10

## 2025-04-10 VITALS
DIASTOLIC BLOOD PRESSURE: 84 MMHG | SYSTOLIC BLOOD PRESSURE: 130 MMHG | OXYGEN SATURATION: 95 % | BODY MASS INDEX: 36.99 KG/M2 | HEIGHT: 62 IN | WEIGHT: 201 LBS | HEART RATE: 68 BPM

## 2025-04-10 DIAGNOSIS — R00.2 PALPITATIONS: Primary | ICD-10-CM

## 2025-04-10 DIAGNOSIS — I10 PRIMARY HYPERTENSION: ICD-10-CM

## 2025-04-10 DIAGNOSIS — E78.5 DYSLIPIDEMIA: ICD-10-CM

## 2025-04-10 LAB
25(OH)D3+25(OH)D2 SERPL-MCNC: 44 NG/ML (ref 30–100)
ALBUMIN SERPL-MCNC: 4.4 G/DL (ref 3.7–4.7)
ALBUMIN/CREAT UR: 19 MG/G CREAT (ref 0–29)
ALP SERPL-CCNC: 67 IU/L (ref 44–121)
ALT SERPL-CCNC: 24 IU/L (ref 0–32)
AST SERPL-CCNC: 31 IU/L (ref 0–40)
BILIRUB SERPL-MCNC: 0.6 MG/DL (ref 0–1.2)
BUN SERPL-MCNC: 13 MG/DL (ref 8–27)
BUN/CREAT SERPL: 20 (ref 12–28)
CALCIUM SERPL-MCNC: 9.6 MG/DL (ref 8.7–10.3)
CHLORIDE SERPL-SCNC: 105 MMOL/L (ref 96–106)
CHOLEST SERPL-MCNC: 155 MG/DL (ref 100–199)
CO2 SERPL-SCNC: 25 MMOL/L (ref 20–29)
CREAT SERPL-MCNC: 0.65 MG/DL (ref 0.57–1)
CREAT UR-MCNC: 138.9 MG/DL
EGFR: 87 ML/MIN/1.73
GLOBULIN SER-MCNC: 2.7 G/DL (ref 1.5–4.5)
GLUCOSE SERPL-MCNC: 88 MG/DL (ref 70–99)
HDLC SERPL-MCNC: 75 MG/DL
LDLC SERPL CALC-MCNC: 62 MG/DL (ref 0–99)
MICROALBUMIN UR-MCNC: 26.7 UG/ML
POTASSIUM SERPL-SCNC: 4.5 MMOL/L (ref 3.5–5.2)
PROT SERPL-MCNC: 7.1 G/DL (ref 6–8.5)
SL AMB VLDL CHOLESTEROL CALC: 18 MG/DL (ref 5–40)
SODIUM SERPL-SCNC: 144 MMOL/L (ref 134–144)
TRIGL SERPL-MCNC: 98 MG/DL (ref 0–149)
VIT B12 SERPL-MCNC: 682 PG/ML (ref 232–1245)

## 2025-04-10 PROCEDURE — 99214 OFFICE O/P EST MOD 30 MIN: CPT | Performed by: INTERNAL MEDICINE

## 2025-04-10 PROCEDURE — 93000 ELECTROCARDIOGRAM COMPLETE: CPT | Performed by: INTERNAL MEDICINE

## 2025-04-10 NOTE — TELEPHONE ENCOUNTER
----- Message from JOSSE Varela sent at 4/10/2025  9:38 AM EDT -----  Regarding: [facility]V appt  Pt canceled [facility]V appt on 4/1. Please call to reschedule

## 2025-04-10 NOTE — PROGRESS NOTES
Cardiology   Nicole Pride DO, [facility]  Warren Clark MD, [facility]  Dmitri Brooks MD, [facility]  Lissette Galindo MD, [facility]  -------------------------------------------------------------------  [facility]  [address], [address], [address]  [place], [place], [address]  [phone]    Cardiology Follow Up  Keo Summers  1940  [id number]          Assessment/Plan:    Assessment & Plan  Palpitations  - Patient with frequent palpitations likely due to stress/anxiety.  In the past, she had PVCs which she is no longer feeling.   - Will obtain 48 hour monitor  - Discuss anxiety medications with primary   - Continue metoprolol  Dyslipidemia  - Lipid levels at goal   - Continue atorvastatin  Primary hypertension  - BP at goal.    - Continue metoprolol           Interval History:     Keo Summers is 84 y.o. female here for followup of palpitations.    She has been feeling palpitations which she describes as a racing sensation occurring mainly at night while she is laying down or if she if she is nervous/anxious.  She has had increased stress recently.   She denies any chest pain or shortness of breath with exertion.     She denies any syncope or near syncope.  She has some chest tightness at times.    Recent blood work was normal.      She had a stress test and echocardiogram in 2022 which were normal.  Previous 2 week event monitor showed a 3.9% PVC burden.  She had symptoms during monitoring which corresponded to PVCs.  She had occasional SVT episodes as well.  Current medication regimen includes Toprol XL 50 mg daily.  Previously, she was using diltiazem.   Holter monitor was done on March 10, 2022 which showed frequent PVCs which totaled 16.8% of all monitored beats without any episodes of ventricular tachycardia.  There were 4 episodes of supraventricular tachycardia with the longest lasting for 5 beats.  Patient also had a stress test done which showed an EF of  "71% with no ischemia or infarction noted.  A 2D echocardiogram showed ejection fraction of 55% with no valve disease.  TSH was normal.        Past Medical History:   Diagnosis Date    Arthritis     DJD- left thumb DJD,most joints    Borderline diabetes     Change in bowel habits     Chronic pain disorder     lumbar    Colon polyp     Constipation     unable to expell the stool completely with BM    COVID-19     in the winter    Deviated septum     Diabetes mellitus (HCC)     Diverticulosis     diverticulitis- with resection-colostomy then reversed    DJD (degenerative joint disease)     pt does get injections to the knee with Dr. Collazo    GERD (gastroesophageal reflux disease)     Glaucoma     Hearing aid worn     bilateral ears for background noise    Hyperlipidemia     Irregular heart beat     \"skips a beat\"    Low back pain     Lumbar degenerative disc disease 2019    Muscle spasm     legs    Peripheral neuropathy     PND (post-nasal drip)     Presence of upper and lower permanent dental bridges     Sciatica     Spinal stenosis     Wears glasses      Social History     Socioeconomic History    Marital status:      Spouse name: Not on file    Number of children: 0    Years of education: Not on file    Highest education level: Not on file   Occupational History    Not on file   Tobacco Use    Smoking status: Former     Current packs/day: 0.00     Types: Cigarettes     Quit date: 1976     Years since quittin.7     Passive exposure: Never    Smokeless tobacco: Never   Vaping Use    Vaping status: Never Used   Substance and Sexual Activity    Alcohol use: No    Drug use: Not Currently     Types: Marijuana     Comment: stopped in     Sexual activity: Not Currently     Partners: Male     Birth control/protection: Post-menopausal   Other Topics Concern    Not on file   Social History Narrative    Not on file     Social Drivers of Health     Financial Resource Strain: Not on file   Food " "Insecurity: Not on file   Transportation Needs: Not on file   Physical Activity: Not on file   Stress: Not on file   Social Connections: Not on file   Intimate Partner Violence: Not on file   Housing Stability: Not on file      Family History   Problem Relation Age of Onset    Diabetes type II Mother     Osteoporosis Mother     Hyperlipidemia Mother     Heart disease Mother     Glaucoma Mother     Diabetes type II Father     Dementia Father     Seizures Father     Diabetes type II Sister     Diabetes type II Brother     No Known Problems Maternal Aunt     No Known Problems Maternal Uncle     No Known Problems Paternal Aunt     No Known Problems Paternal Uncle     No Known Problems Maternal Grandmother     No Known Problems Maternal Grandfather     No Known Problems Paternal Grandmother     No Known Problems Paternal Grandfather     No Known Problems Son     No Known Problems Daughter     No Known Problems Daughter      Past Surgical History:   Procedure Laterality Date    BREAST SURGERY  2005    bilateral reduction    COLON SURGERY  2014    resection with colostomy-ruptured \"intestine\"    COLONOSCOPY N/A 07/11/2016    Procedure: COLONOSCOPY;  Surgeon: Sunny Wyman MD;  Location: [facility] GI LAB;  Service:     COLOSTOMY CLOSURE  2015    COMBINED REDUCTION MAMMAPLASTY W/ LIPOSUCTION      EPIDURAL BLOCK INJECTION N/A 11/15/2019    Procedure: L4 L5 Lumbar Epidural Steroid Injection (39078);  Surgeon: Sherman Dinero MD;  Location: [facility] MAIN OR;  Service: Pain Management     EPIDURAL BLOCK INJECTION N/A 03/12/2020    Procedure: L4 L5 Lumbar Epidural Steroid Injection (23787);  Surgeon: Sherman Dinero MD;  Location: [facility] MAIN OR;  Service: Pain Management     EPIDURAL BLOCK INJECTION N/A 07/23/2020    Procedure: L4-L5 LUMBAR EPIDURAL STEROID INJECTION (#2) (08143);  Surgeon: Sherman Dinero MD;  Location: [facility] MAIN OR;  Service: Pain Management     HERNIA MESH REMOVAL  06/22/2022    hernia repair, mesh removal " [place] [facility]    HERNIA REPAIR  2016    x3    AL XCAPSL CTRC RMVL INSJ IO LENS PROSTH W/O ECP Right 08/22/2022    Procedure: EXTRACTION EXTRACAPSULAR CATARACT PHACO INTRAOCULAR LENS (IOL);  Surgeon: Cristian Ly MD;  Location: [facility] MAIN OR;  Service: Ophthalmology    AL XCAPSL CTRC RMVL INSJ IO LENS PROSTH W/O ECP Left 10/24/2022    Procedure: EXTRACTION EXTRACAPSULAR CATARACT PHACO INTRAOCULAR LENS (IOL);  Surgeon: Cristian Ly MD;  Location: [facility] MAIN OR;  Service: Ophthalmology    TUBAL LIGATION         Current Outpatient Medications:     aspirin (ECOTRIN LOW STRENGTH) 81 mg EC tablet, 81 mg daily at bedtime, Disp: , Rfl:     atorvastatin (LIPITOR) 40 mg tablet, TAKE 1 TABLET (40 MG TOTAL) BY MOUTH DAILY, Disp: 90 tablet, Rfl: 1    azelastine (ASTELIN) 0.1 % nasal spray, 1 spray into each nostril 2 (two) times a day as needed for rhinitis Use in each nostril as directed, Disp: , Rfl:     b complex vitamins capsule, Take 1 capsule by mouth daily, Disp: , Rfl:     Blood Glucose Monitoring Suppl (ONE TOUCH ULTRA 2) w/Device KIT, , Disp: , Rfl:     GEE SEED PO, Take by mouth in the morning, Disp: , Rfl:     cholecalciferol (VITAMIN D3) 1,000 units tablet, Take 1 tablet (1,000 Units total) by mouth daily, Disp: 30 tablet, Rfl: 0    Coenzyme Q10 (Co Q 10) 100 MG CAPS, Take by mouth daily with lunch, Disp: , Rfl:     COMFORT EZ PEN NEEDLES 31G X 6 MM MISC, daily , Disp: , Rfl:     fluticasone (FLONASE) 50 mcg/act nasal spray, , Disp: , Rfl:     glucose blood (Contour Next Test) test strip, USE 1 EACH IN THE MORNING FOR 90 DOSES CHECK SUGAR ONCE DAILY, Disp: 100 each, Rfl: 1    latanoprost (XALATAN) 0.005 % ophthalmic solution, Administer to both eyes daily at bedtime, Disp: , Rfl:     magnesium (MAGTAB) 84 MG (7MEQ) TBCR, Take 84 mg by mouth daily, Disp: , Rfl:     metoprolol succinate (TOPROL-XL) 50 mg 24 hr tablet, TAKE 1 TABLET (50 MG TOTAL) BY MOUTH DAILY AT BEDTIME, Disp: 90 tablet, Rfl:  "0    multivitamin (THERAGRAN) TABS, Take 1 tablet by mouth daily, Disp: , Rfl:     omeprazole (PriLOSEC) 40 MG capsule, , Disp: , Rfl:     ONE TOUCH CLUB LANCETS [facility], , Disp: , Rfl:     Tirzepatide (Mounjaro) 2.5 MG/0.5ML SOAJ, Inject 2.5 mg under the skin once a week, Disp: 2 mL, Rfl: 2    travoprost (Travatan Z) 0.004 % ophthalmic solution, , Disp: , Rfl:     TURMERIC PO, Take by mouth in the morning, Disp: , Rfl:     zinc sulfate (ZINCATE) 220 mg capsule, Take 1 capsule (220 mg total) by mouth daily, Disp: 30 capsule, Rfl: 0    ascorbic Acid (VITAMIN C) 500 MG CPCR, Take 1 capsule (500 mg total) by mouth 2 (two) times a day, Disp: 60 capsule, Rfl: 0        Review of Systems:  Review of Systems   Respiratory:  Positive for chest tightness. Negative for shortness of breath.    Cardiovascular:  Positive for palpitations. Negative for leg swelling.   Musculoskeletal:  Positive for arthralgias.   All other systems reviewed and are negative.        Physical Exam:  Vitals:  Vitals:    04/10/25 1416   BP: 130/84   BP Location: Left arm   Patient Position: Sitting   Cuff Size: Large   Pulse: 68   SpO2: 95%   Weight: 91.2 kg (201 lb)   Height: 5' 2.25\" (1.581 m)     Physical Exam   Constitutional: She appears healthy. No distress.   Eyes: Pupils are equal, round, and reactive to light. Conjunctivae are normal.   Neck: No JVD present.   Cardiovascular: Normal rate, regular rhythm and normal heart sounds. Exam reveals no gallop and no friction rub.   No murmur heard.  Pulmonary/Chest: Effort normal and breath sounds normal. She has no wheezes. She has no rales.   Musculoskeletal:         General: No tenderness, deformity or edema.      Cervical back: Normal range of motion and neck supple.   Neurological: She is alert and oriented to person, place, and time.   Skin: Skin is warm and dry.        Cardiographics:  EKG: Personally reviewed NSR   Last known EF: 55%    This note was completed in part utilizing M-Modal Fluency " Direct Software.  Grammatical errors, random word insertions, spelling mistakes, and incomplete sentences can be an occasional consequence of this system secondary to software limitations, ambient noise, and hardware issues.  If you have any questions or concerns about the content, text, or information contained within the body of this dictation, please contact the provider for clarification.

## 2025-04-11 ENCOUNTER — RESULTS FOLLOW-UP (OUTPATIENT)
Dept: ENDOCRINOLOGY | Facility: CLINIC | Age: 85
End: 2025-04-11

## 2025-04-11 NOTE — TELEPHONE ENCOUNTER
Upon review of the In Basket request we were able to locate, review, and update the patient chart as requested for Diabetic Eye Exam.    Any additional questions or concerns should be emailed to the Practice Liaisons via the appropriate education email address, please do not reply via In Basket.    Thank you  Karthik Olsne MA   [facility]

## 2025-04-17 ENCOUNTER — TELEPHONE (OUTPATIENT)
Dept: FAMILY MEDICINE CLINIC | Facility: CLINIC | Age: 85
End: 2025-04-17

## 2025-04-17 NOTE — TELEPHONE ENCOUNTER
----- Message from JOSSE Varela sent at 4/17/2025  2:14 PM EDT -----  Regarding: RE: appt  Please call pt to advise that both visits will be done at the same time.  ----- Message -----  From: Jailene Higuera  Sent: 4/17/2025   2:05 PM EDT  To: JOSSE Varela  Subject: RE: appt                                         She didn't want to combine her AWV and this issue together because she was worried about the billing.  They were supposed to schedule her for two different appointments.  Please advise.  ----- Message -----  From: JOSSE Varela  Sent: 4/17/2025   2:01 PM EDT  To: [facility] Clerical  Subject: appt                                             Pt appt needs to be changed to AWV , lab review on 4/24  Currently is scheduled for short visit for sick visit and missed scheduled AWV appt which was supposed to be rescheduled

## 2025-04-24 ENCOUNTER — OFFICE VISIT (OUTPATIENT)
Dept: FAMILY MEDICINE CLINIC | Facility: CLINIC | Age: 85
End: 2025-04-24
Payer: COMMERCIAL

## 2025-04-24 VITALS
RESPIRATION RATE: 18 BRPM | WEIGHT: 199.4 LBS | HEIGHT: 62 IN | OXYGEN SATURATION: 98 % | HEART RATE: 68 BPM | BODY MASS INDEX: 36.7 KG/M2 | TEMPERATURE: 97.6 F | SYSTOLIC BLOOD PRESSURE: 136 MMHG | DIASTOLIC BLOOD PRESSURE: 80 MMHG

## 2025-04-24 DIAGNOSIS — Z00.00 MEDICARE ANNUAL WELLNESS VISIT, SUBSEQUENT: Primary | ICD-10-CM

## 2025-04-24 DIAGNOSIS — R32 URINARY INCONTINENCE, UNSPECIFIED TYPE: ICD-10-CM

## 2025-04-24 PROCEDURE — G0439 PPPS, SUBSEQ VISIT: HCPCS | Performed by: NURSE PRACTITIONER

## 2025-04-24 NOTE — PROGRESS NOTES
Name: Keo Summers      : 1940      MRN: [id number]  Encounter Provider: JOSSE Varela  Encounter Date: 2025   Encounter department: [facility]  :  Assessment & Plan  Medicare annual wellness visit, subsequent  Heart healthy, carbohydrate controlled diet- limit red meat, limit saturated fat, moderate salt intake, limit junk food, etc.   Regular exercise  Stress management  Routine labwork and screenings as ordered.          Urinary incontinence, unspecified type  Reviewed management strategies. Follow up with urology as scheduled.        BMI Counseling: Body mass index is 36.77 kg/m². The BMI is above normal. Exercise recommendations include exercising 3-5 times per week.     Depression Screening and Follow-up Plan: Patient's depression screening was positive with a PHQ-2 score of 3. Their PHQ-9 score was 11.   Patient with underlying depression and was advised to continue current medications as prescribed.     Urinary Incontinence Plan of Care: counseling topics discussed: use restroom every 2 hours, limit alcohol, caffeine, spicy foods, and acidic foods, limiting fluid intake 3-4 hours before bed and weight loss.       Preventive health issues were discussed with patient, and age appropriate screening tests were ordered as noted in patient's After Visit Summary. Personalized health advice and appropriate referrals for health education or preventive services given if needed, as noted in patient's After Visit Summary.    History of Present Illness     Here for lab review and AWV  Follows with multiple specialists including endocrine, urology, cardiology, and ophthalmology.   Recently saw endocrine and cardiology  Sees Dr Pride, will be getting holter monitor.   Was having issues with sinus congestion, has allergies, thought was getting infection, but started using nasal rinses and feels better.   Seeing urology because has ongoing urinary incontinence.   Admits has  depression at times. Not suicidal. Mood fluctuates. Tries to stay busy.   Taking all meds as prescribed.          Patient Care Team:  JOSSE Varela as PCP - General (Family Medicine)  JOSEPH Ortiz as PCP - PCP-[facility] ([facility])  JOSSE Varela as PCP - PCP-[facility] ([facility])  JOSSE Varela as PCP - PCP-[facility] ([facility])  MD Nicole Storm DO Amit Prasad, MD Barry Eugene Herman, MD as Endoscopist  Rosenda Galindo MD (Endocrinology)  JOSSE Spence (Endocrinology)    Review of Systems   Constitutional:  Negative for fatigue and unexpected weight change.   Eyes:  Negative for visual disturbance.   Respiratory:  Negative for chest tightness and shortness of breath.    Cardiovascular:  Negative for chest pain and palpitations.   Gastrointestinal:  Negative for abdominal pain.   Genitourinary:  Negative for hematuria.        Urinary incont   Musculoskeletal:  Negative for arthralgias and myalgias.   Skin:  Negative for rash and wound.   Allergic/Immunologic: Positive for environmental allergies.   Neurological:  Negative for dizziness and headaches.   Psychiatric/Behavioral:  Negative for dysphoric mood. The patient is not nervous/anxious.      Medical History Reviewed by provider this encounter:       Annual Wellness Visit Questionnaire   Keo is here for her Subsequent Wellness visit.     Health Risk Assessment:   Patient rates overall health as fair. Patient feels that their physical health rating is slightly worse. Patient is dissatisfied with their life. Eyesight was rated as same. Hearing was rated as same. Patient feels that their emotional and mental health rating is slightly worse. Patients states they are never, rarely angry. Patient states they are often unusually tired/fatigued. Pain experienced in the last 7 days has been a lot. Patient's pain rating has been 7/10. Patient states that she has experienced no weight loss or gain in last 6 months.      Depression Screening:   PHQ-2 Score: 3  PHQ-9 Score: 11      Fall Risk Screening:   In the past year, patient has experienced: history of falling in past year    Number of falls: 1  Injured during fall?: Yes    Feels unsteady when standing or walking?: Yes    Worried about falling?: Yes      Urinary Incontinence Screening:   Patient has leaked urine accidently in the last six months.     Home Safety:  Patient has trouble with stairs inside or outside of their home. Patient has working smoke alarms and has working carbon monoxide detector. Home safety hazards include: none.     Nutrition:   Current diet is Low Carb and Limited junk food.     Medications:   Patient is currently taking over-the-counter supplements. OTC medications include: see medication list. Patient is able to manage medications.     Activities of Daily Living (ADLs)/Instrumental Activities of Daily Living (IADLs):   Walk and transfer into and out of bed and chair?: Yes  Dress and groom yourself?: Yes    Bathe or shower yourself?: Yes    Feed yourself? Yes  Do your laundry/housekeeping?: Yes  Manage your money, pay your bills and track your expenses?: Yes  Make your own meals?: Yes    Do your own shopping?: Yes    Previous Hospitalizations:   Any hospitalizations or ED visits within the last 12 months?: No      Advance Care Planning:   Living will: Yes    Durable POA for healthcare: Yes    Advanced directive: Yes      Cognitive Screening:   Provider or family/friend/caregiver concerned regarding cognition?: No    Preventive Screenings      Cardiovascular Screening:    General: Screening Current and Risks and Benefits Discussed      Diabetes Screening:     General: History Diabetes, Risks and Benefits Discussed and Screening Current      Colorectal Cancer Screening:     General: Screening Not Indicated      Breast Cancer Screening:     General: Screening Not Indicated      Cervical Cancer Screening:    General: Screening Not Indicated       Osteoporosis Screening:    General: Risks and Benefits Discussed    Due for: Bone Density Ultrasound      Abdominal Aortic Aneurysm (AAA) Screening:        General: Risks and Benefits Discussed      Lung Cancer Screening:     General: Screening Not Indicated      Preventive Screening Comments: Recommended immunizations reviewed, risks/benefits discussed. Pt verbalized understanding.         Immunizations:  - Immunizations due: Influenza, Prevnar 20 and Zoster (Shingrix)    Screening, Brief Intervention, and Referral to Treatment (SBIRT)     Screening    Typical number of drinks in a week: 0    Single Item Drug Screening:  How often have you used an illegal drug (including marijuana) or a prescription medication for non-medical reasons in the past year? never    Single Item Drug Screen Score: 0  Interpretation: Negative screen for possible drug use disorder    Brief Intervention  Alcohol & drug use screenings were reviewed. No concerns regarding substance use disorder identified.     SDOH Risk Assessment  Social determinants of health (SDOH) risk assesment tool was completed. The tool at a minimum covered housing stability, food insecurity, transportation needs, and utility difficulty. Patient had at risk responses for the following SDOH domains: food insecurity.        BMI Counseling: Body mass index is 36.77 kg/m². The BMI is above normal. Nutrition recommendations include reducing portion sizes, decreasing overall calorie intake, moderation in carbohydrate intake, reducing intake of saturated fat and trans fat, and reducing intake of cholesterol. Exercise recommendations include exercising 3-5 times per week.    Falls Plan of Care: Balance, strength, and gait training instructions were provided.  Depression Screening Follow-up Plan: Patient's depression screening was positive with a PHQ-2 score of 3. Their PHQ-9 score was 11. Patient with underlying depression and was advised to continue current medications as  prescribed. Patient assessed for underlying major depression. They have no active suicidal ideations. Brief counseling provided and recommend additional follow-up/re-evaluation next office visit.    Recent Results (from the past 4 weeks)   POCT hemoglobin A1c    Collection Time: 03/27/25  2:03 PM   Result Value Ref Range    Hemoglobin A1C 5.3 <=6.5   Comprehensive metabolic panel    Collection Time: 04/08/25  1:11 PM   Result Value Ref Range    Glucose, Random 88 70 - 99 mg/dL    BUN 13 8 - 27 mg/dL    Creatinine 0.65 0.57 - 1.00 mg/dL    eGFR 87 >59 mL/min/1.73    SL AMB BUN/CREATININE RATIO 20 12 - 28    Sodium 144 134 - 144 mmol/L    Potassium 4.5 3.5 - 5.2 mmol/L    Chloride 105 96 - 106 mmol/L    CO2 25 20 - 29 mmol/L    CALCIUM 9.6 8.7 - 10.3 mg/dL    Protein, Total 7.1 6.0 - 8.5 g/dL    Albumin 4.4 3.7 - 4.7 g/dL    Globulin, Total 2.7 1.5 - 4.5 g/dL    TOTAL BILIRUBIN 0.6 0.0 - 1.2 mg/dL    Alk Phos Isoenzymes 67 44 - 121 IU/L    AST 31 0 - 40 IU/L    ALT 24 0 - 32 IU/L   Lipid panel    Collection Time: 04/08/25  1:11 PM   Result Value Ref Range    Cholesterol, Total 155 100 - 199 mg/dL    Triglycerides 98 0 - 149 mg/dL    HDL 75 >39 mg/dL    VLDL Cholesterol Calculated 18 5 - 40 mg/dL    LDL Calculated 62 0 - 99 mg/dL   Albumin / creatinine urine ratio    Collection Time: 04/08/25  1:11 PM   Result Value Ref Range    Creatinine, Urine 138.9 Not Estab. mg/dL    Albumin,U,Random 26.7 Not Estab. ug/mL    Microalb/Creat Ratio 19 0 - 29 mg/g creat   Vitamin D 25 hydroxy    Collection Time: 04/08/25  1:11 PM   Result Value Ref Range    25-HYDROXY VIT D 44.0 30.0 - 100.0 ng/mL   Vitamin B12    Collection Time: 04/08/25  1:11 PM   Result Value Ref Range    Vitamin B-12 682 232 - 1,245 pg/mL   CBC and differential    Collection Time: 04/08/25  1:17 PM   Result Value Ref Range    White Blood Cell Count 6.5 3.4 - 10.8 x10E3/uL    Red Blood Cell Count 4.50 3.77 - 5.28 x10E6/uL    Hemoglobin 13.4 11.1 - 15.9 g/dL    HCT  40.3 34.0 - 46.6 %    MCV 90 79 - 97 fL    MCH 29.8 26.6 - 33.0 pg    MCHC 33.3 31.5 - 35.7 g/dL    RDW 13.8 11.7 - 15.4 %    Platelet Count 205 150 - 450 x10E3/uL    Neutrophils 58 Not Estab. %    Lymphocytes 27 Not Estab. %    Monocytes 10 Not Estab. %    Eosinophils 4 Not Estab. %    Basophils PCT 1 Not Estab. %    Neutrophils (Absolute) 3.8 1.4 - 7.0 x10E3/uL    Lymphocytes (Absolute) 1.8 0.7 - 3.1 x10E3/uL    Monocytes (Absolute) 0.7 0.1 - 0.9 x10E3/uL    Eosinophils (Absolute) 0.2 0.0 - 0.4 x10E3/uL    Basophils ABS 0.1 0.0 - 0.2 x10E3/uL    Immature Granulocytes 0 Not Estab. %    Immature Granulocytes (Absolute) 0.0 0.0 - 0.1 x10E3/uL   Comprehensive metabolic panel    Collection Time: 04/08/25  1:17 PM   Result Value Ref Range    Glucose, Random 89 70 - 99 mg/dL    BUN 13 8 - 27 mg/dL    Creatinine 0.62 0.57 - 1.00 mg/dL    eGFR 88 >59 mL/min/1.73    SL AMB BUN/CREATININE RATIO 21 12 - 28    Sodium 144 134 - 144 mmol/L    Potassium 4.9 3.5 - 5.2 mmol/L    Chloride 105 96 - 106 mmol/L    CO2 25 20 - 29 mmol/L    CALCIUM 9.6 8.7 - 10.3 mg/dL    Protein, Total 7.1 6.0 - 8.5 g/dL    Albumin 4.3 3.7 - 4.7 g/dL    Globulin, Total 2.8 1.5 - 4.5 g/dL    TOTAL BILIRUBIN 0.6 0.0 - 1.2 mg/dL    Alk Phos Isoenzymes 67 44 - 121 IU/L    AST 34 0 - 40 IU/L    ALT 25 0 - 32 IU/L   Hemoglobin A1C    Collection Time: 04/08/25  1:17 PM   Result Value Ref Range    Hemoglobin A1C 5.5 4.8 - 5.6 %    Estimated Average Glucose 111 mg/dL   Lipid panel    Collection Time: 04/08/25  1:17 PM   Result Value Ref Range    Cholesterol, Total 159 100 - 199 mg/dL    Triglycerides 98 0 - 149 mg/dL    HDL 76 >39 mg/dL    VLDL Cholesterol Calculated 18 5 - 40 mg/dL    LDL Calculated 65 0 - 99 mg/dL    T. Chol/HDL Ratio 2.1 0.0 - 4.4 ratio     Reviewed lab/diagnostic results with pt including both normal and abnormal findings.   In depth counseling and instructions given. All questions answered during visit.       Objective   /80   Pulse  "68   Temp 97.6 °F (36.4 °C)   Resp 18   Ht 5' 1.75\" (1.568 m)   Wt 90.4 kg (199 lb 6.4 oz)   SpO2 98%   BMI 36.77 kg/m²     Physical Exam  Vitals reviewed.   Constitutional:       General: She is not in acute distress.     Appearance: She is not ill-appearing.   Neck:      Vascular: No carotid bruit.   Cardiovascular:      Rate and Rhythm: Normal rate and regular rhythm.   Pulmonary:      Effort: Pulmonary effort is normal. No respiratory distress.      Breath sounds: Normal breath sounds. No wheezing or rales.   Abdominal:      General: There is no distension.      Palpations: Abdomen is soft.   Musculoskeletal:      Cervical back: Normal range of motion.      Right lower leg: No edema.      Left lower leg: No edema.   Skin:     General: Skin is warm and dry.      Coloration: Skin is not jaundiced or pale.   Neurological:      General: No focal deficit present.      Mental Status: She is alert and oriented to person, place, and time.      Cranial Nerves: No cranial nerve deficit.      Sensory: No sensory deficit.   Psychiatric:         Mood and Affect: Mood normal.         Behavior: Behavior normal.         Thought Content: Thought content normal.         Judgment: Judgment normal.         "

## 2025-04-24 NOTE — PATIENT INSTRUCTIONS
Medicare Preventive Visit Patient Instructions  Thank you for completing your Welcome to Medicare Visit or Medicare Annual Wellness Visit today. Your next wellness visit will be due in one year (4/25/2026).  The screening/preventive services that you may require over the next 5-10 years are detailed below. Some tests may not apply to you based off risk factors and/or age. Screening tests ordered at today's visit but not completed yet may show as past due. Also, please note that scanned in results may not display below.  Preventive Screenings:  Service Recommendations Previous Testing/Comments   Colorectal Cancer Screening  * Colonoscopy    * Fecal Occult Blood Test (FOBT)/Fecal Immunochemical Test (FIT)  * Fecal DNA/Cologuard Test  * Flexible Sigmoidoscopy Age: 45-75 years old   Colonoscopy: every 10 years (may be performed more frequently if at higher risk)  OR  FOBT/FIT: every 1 year  OR  Cologuard: every 3 years  OR  Sigmoidoscopy: every 5 years  Screening may be recommended earlier than age 45 if at higher risk for colorectal cancer. Also, an individualized decision between you and your healthcare provider will decide whether screening between the ages of 76-85 would be appropriate. Colonoscopy: 08/31/2020  FOBT/FIT: Not on file  Cologuard: Not on file  Sigmoidoscopy: Not on file          Breast Cancer Screening Age: 40+ years old  Frequency: every 1-2 years  Not required if history of left and right mastectomy Mammogram: Not on file        Cervical Cancer Screening Between the ages of 21-29, pap smear recommended once every 3 years.   Between the ages of 30-65, can perform pap smear with HPV co-testing every 5 years.   Recommendations may differ for women with a history of total hysterectomy, cervical cancer, or abnormal pap smears in past. Pap Smear: Not on file    Screening Not Indicated   Hepatitis C Screening Once for adults born between 1945 and 1965  More frequently in patients at high risk for Hepatitis  C Hep C Antibody: Not on file        Diabetes Screening 1-2 times per year if you're at risk for diabetes or have pre-diabetes Fasting glucose: 98 mg/dL (11/29/2024)  A1C: 5.5 % (4/8/2025)  Screening Not Indicated  History Diabetes   Cholesterol Screening Once every 5 years if you don't have a lipid disorder. May order more often based on risk factors. Lipid panel: 04/08/2025    Screening Current     Other Preventive Screenings Covered by Medicare:  Abdominal Aortic Aneurysm (AAA) Screening: covered once if your at risk. You're considered to be at risk if you have a family history of AAA.  Lung Cancer Screening: covers low dose CT scan once per year if you meet all of the following conditions: (1) Age 55-77; (2) No signs or symptoms of lung cancer; (3) Current smoker or have quit smoking within the last 15 years; (4) You have a tobacco smoking history of at least 20 pack years (packs per day multiplied by number of years you smoked); (5) You get a written order from a healthcare provider.  Glaucoma Screening: covered annually if you're considered high risk: (1) You have diabetes OR (2) Family history of glaucoma OR (3)  aged 50 and older OR (4)  American aged 65 and older  Osteoporosis Screening: covered every 2 years if you meet one of the following conditions: (1) You're estrogen deficient and at risk for osteoporosis based off medical history and other findings; (2) Have a vertebral abnormality; (3) On glucocorticoid therapy for more than 3 months; (4) Have primary hyperparathyroidism; (5) On osteoporosis medications and need to assess response to drug therapy.   Last bone density test (DXA Scan): 07/23/2020.  HIV Screening: covered annually if you're between the age of 15-65. Also covered annually if you are younger than 15 and older than 65 with risk factors for HIV infection. For pregnant patients, it is covered up to 3 times per pregnancy.    Immunizations:  Immunization  Recommendations   Influenza Vaccine Annual influenza vaccination during flu season is recommended for all persons aged >= 6 months who do not have contraindications   Pneumococcal Vaccine   * Pneumococcal conjugate vaccine = PCV13 (Prevnar 13), PCV15 (Vaxneuvance), PCV20 (Prevnar 20)  * Pneumococcal polysaccharide vaccine = PPSV23 (Pneumovax) Adults 19-63 yo with certain risk factors or if 65+ yo  If never received any pneumonia vaccine: recommend Prevnar 20 (PCV20)  Give PCV20 if previously received 1 dose of PCV13 or PPSV23   Hepatitis B Vaccine 3 dose series if at intermediate or high risk (ex: diabetes, end stage renal disease, liver disease)   Respiratory syncytial virus (RSV) Vaccine - COVERED BY MEDICARE PART D  * RSVPreF3 (Arexvy) CDC recommends that adults 60 years of age and older may receive a single dose of RSV vaccine using shared clinical decision-making (SCDM)   Tetanus (Td) Vaccine - COST NOT COVERED BY MEDICARE PART B Following completion of primary series, a booster dose should be given every 10 years to maintain immunity against tetanus. Td may also be given as tetanus wound prophylaxis.   Tdap Vaccine - COST NOT COVERED BY MEDICARE PART B Recommended at least once for all adults. For pregnant patients, recommended with each pregnancy.   Shingles Vaccine (Shingrix) - COST NOT COVERED BY MEDICARE PART B  2 shot series recommended in those 19 years and older who have or will have weakened immune systems or those 50 years and older     Health Maintenance Due:  There are no preventive care reminders to display for this patient.  Immunizations Due:      Topic Date Due   • Pneumococcal Vaccine: 65+ Years (2 of 2 - PCV) 06/19/2013   • Influenza Vaccine (1) 09/01/2024   • COVID-19 Vaccine (1 - 2024-25 season) Never done     Advance Directives   What are advance directives?  Advance directives are legal documents that state your wishes and plans for medical care. These plans are made ahead of time in case  you lose your ability to make decisions for yourself. Advance directives can apply to any medical decision, such as the treatments you want, and if you want to donate organs.   What are the types of advance directives?  There are many types of advance directives, and each state has rules about how to use them. You may choose a combination of any of the following:  Living will:  This is a written record of the treatment you want. You can also choose which treatments you do not want, which to limit, and which to stop at a certain time. This includes surgery, medicine, IV fluid, and tube feedings.   Durable power of  for healthcare (DPAHC):  This is a written record that states who you want to make healthcare choices for you when you are unable to make them for yourself. This person, called a proxy, is usually a family member or a friend. You may choose more than 1 proxy.  Do not resuscitate (DNR) order:  A DNR order is used in case your heart stops beating or you stop breathing. It is a request not to have certain forms of treatment, such as CPR. A DNR order may be included in other types of advance directives.  Medical directive:  This covers the care that you want if you are in a coma, near death, or unable to make decisions for yourself. You can list the treatments you want for each condition. Treatment may include pain medicine, surgery, blood transfusions, dialysis, IV or tube feedings, and a ventilator (breathing machine).  Values history:  This document has questions about your views, beliefs, and how you feel and think about life. This information can help others choose the care that you would choose.  Why are advance directives important?  An advance directive helps you control your care. Although spoken wishes may be used, it is better to have your wishes written down. Spoken wishes can be misunderstood, or not followed. Treatments may be given even if you do not want them. An advance directive may  make it easier for your family to make difficult choices about your care.   Depression   Depression  is a medical condition that causes feelings of sadness or hopelessness that do not go away. Depression may cause you to lose interest in things you used to enjoy. These feelings may interfere with your daily life.  Call your local emergency number (911 in the ) if:   You think about harming yourself or someone else.  You have done something on purpose to hurt yourself.  The following resources are available at any time to help you, if needed:   National Suicide Prevention Lifeline: 1-697.569.4010 (6-055-550-TALK)   Suicide Hotline: 1-163.204.9965 (8-533-LZZPRZB)   For a list of international numbers: https://save.org/find-help/international-resources/  Treatment for depression may include medicine to relieve depression. Medicine is often used together with therapy. Therapy is a way for you to talk about your feelings and anything that may be causing depression. Therapy can be done alone or in a group. It may also be done with family members or a significant other.  Get regular physical activity.    Create a regular sleep schedule.    Eat a variety of healthy foods.    Do not drink alcohol or use drugs.     Fall Prevention    Fall prevention  includes ways to make your home and other areas safer. It also includes ways you can move more carefully to prevent a fall. Health conditions that cause changes in your blood pressure, vision, or muscle strength and coordination may increase your risk for falls. Medicines may also increase your risk for falls if they make you dizzy, weak, or sleepy.   Fall prevention tips:   Stand or sit up slowly.    Use assistive devices as directed.    Wear shoes that fit well and have soles that .    Wear a personal alarm.    Stay active.    Manage your medical conditions.    Home Safety Tips:  Add items to prevent falls in the bathroom.    Keep paths clear.    Install bright lights in  your home.    Keep items you use often on shelves within reach.    Paint or place reflective tape on the edges of your stairs.    Urinary Incontinence   Urinary incontinence (UI)  is when you lose control of your bladder. UI develops because your bladder cannot store or empty urine properly. The 3 most common types of UI are stress incontinence, urge incontinence, or both.  Medicines:   May be given to help strengthen your bladder control. Report any side effects of medication to your healthcare provider.  Do pelvic muscle exercises often:  Your pelvic muscles help you stop urinating. Squeeze these muscles tight for 5 seconds, then relax for 5 seconds. Gradually work up to squeezing for 10 seconds. Do 3 sets of 15 repetitions a day, or as directed. This will help strengthen your pelvic muscles and improve bladder control.  Train your bladder:  Go to the bathroom at set times, such as every 2 hours, even if you do not feel the urge to go. You can also try to hold your urine when you feel the urge to go. For example, hold your urine for 5 minutes when you feel the urge to go. As that becomes easier, hold your urine for 10 minutes.   Self-care:   Keep a UI record.  Write down how often you leak urine and how much you leak. Make a note of what you were doing when you leaked urine.  Drink liquids as directed. You may need to limit the amount of liquid you drink to help control your urine leakage. Do not drink any liquid right before you go to bed. Limit or do not have drinks that contain caffeine or alcohol.   Prevent constipation.  Eat a variety of high-fiber foods. Good examples are high-fiber cereals, beans, vegetables, and whole-grain breads. Walking is the best way to trigger your intestines to have a bowel movement.  Exercise regularly and maintain a healthy weight.  Weight loss and exercise will decrease pressure on your bladder and help you control your leakage.   Use a catheter as directed  to help empty your  bladder. A catheter is a tiny, plastic tube that is put into your bladder to drain your urine.   Go to behavior therapy as directed.  Behavior therapy may be used to help you learn to control your urge to urinate.    Weight Management   Why it is important to manage your weight:  Being overweight increases your risk of health conditions such as heart disease, high blood pressure, type 2 diabetes, and certain types of cancer. It can also increase your risk for osteoarthritis, sleep apnea, and other respiratory problems. Aim for a slow, steady weight loss. Even a small amount of weight loss can lower your risk of health problems.  How to lose weight safely:  A safe and healthy way to lose weight is to eat fewer calories and get regular exercise. You can lose up about 1 pound a week by decreasing the number of calories you eat by 500 calories each day.   Healthy meal plan for weight management:  A healthy meal plan includes a variety of foods, contains fewer calories, and helps you stay healthy. A healthy meal plan includes the following:  Eat whole-grain foods more often.  A healthy meal plan should contain fiber. Fiber is the part of grains, fruits, and vegetables that is not broken down by your body. Whole-grain foods are healthy and provide extra fiber in your diet. Some examples of whole-grain foods are whole-wheat breads and pastas, oatmeal, brown rice, and bulgur.  Eat a variety of vegetables every day.  Include dark, leafy greens such as spinach, kale, lela greens, and mustard greens. Eat yellow and orange vegetables such as carrots, sweet potatoes, and winter squash.   Eat a variety of fruits every day.  Choose fresh or canned fruit (canned in its own juice or light syrup) instead of juice. Fruit juice has very little or no fiber.  Eat low-fat dairy foods.  Drink fat-free (skim) milk or 1% milk. Eat fat-free yogurt and low-fat cottage cheese. Try low-fat cheeses such as mozzarella and other reduced-fat  "cheeses.  Choose meat and other protein foods that are low in fat.  Choose beans or other legumes such as split peas or lentils. Choose fish, skinless poultry (chicken or turkey), or lean cuts of red meat (beef or pork). Before you cook meat or poultry, cut off any visible fat.   Use less fat and oil.  Try baking foods instead of frying them. Add less fat, such as margarine, sour cream, regular salad dressing and mayonnaise to foods. Eat fewer high-fat foods. Some examples of high-fat foods include french fries, doughnuts, ice cream, and cakes.  Eat fewer sweets.  Limit foods and drinks that are high in sugar. This includes candy, cookies, regular soda, and sweetened drinks.  Exercise:  Exercise at least 30 minutes per day on most days of the week. Some examples of exercise include walking, biking, dancing, and swimming. You can also fit in more physical activity by taking the stairs instead of the elevator or parking farther away from stores. Ask your healthcare provider about the best exercise plan for you.      © Copyright [employer] 2018 Information is for End User's use only and may not be sold, redistributed or otherwise used for commercial purposes. All illustrations and images included in CareNotes® are the copyrighted property of A.D.A.M., Inc. or [employer]      Patient Education     Urinary incontinence in females   The Basics   Written by the doctors and editors at [facility]   What is urinary incontinence? -- Urinary incontinence is the medical term for when a person leaks urine or loses bladder control.  Incontinence is a very common problem, but it is not a normal part of aging. If you have this problem, there are treatments that can help. There are also things that you can do on your own to stop or reduce urine leakage so you don't have to \"just live with it.\"  What are the symptoms of incontinence? -- There are different types of incontinence. Each causes different symptoms. The 3 most " "common types are:   Stress incontinence - With stress incontinence, you leak urine when you laugh, cough, sneeze, or do anything that \"stresses\" the belly. Stress incontinence is most common in females, especially those who have had a baby.   Urgency incontinence - With urgency incontinence, you feel a strong need to urinate all of a sudden. This is also known as \"urge incontinence.\" Often, the \"urge\" is so strong that you can't make it to the bathroom in time. \"Overactive bladder\" is another term for having a sudden, frequent urge to urinate. People with overactive bladder might or might not actually leak urine.   Mixed incontinence - With mixed incontinence, you have symptoms of both stress and urgency incontinence.  Is there anything I can do on my own to feel better? -- Yes. Here are some things that can help reduce urine leaks:   Reduce the amount of liquid that you drink, especially a few hours before bed.   Cut down on any foods or drinks that make your symptoms worse. Some people find that alcohol, caffeine, or spicy or acidic foods irritate the bladder.   Try to lose weight, if you are overweight. Your doctor or nurse can help you do this in a healthy way.   If you have diabetes, keep your blood sugar as close to your goal level as possible.   If you take medicines called diuretics, plan ahead. These medicines increase the need to urinate. Try to take them when you know you will be near a bathroom for a few hours. If you keep having problems with leakage because of diuretics, ask your doctor if you can take a lower dose or switch to a different medicine.  These techniques can also help improve bladder control:   Bladder retraining - During bladder retraining, you go to the bathroom at scheduled times. For instance, you might decide that you will go every hour. Make yourself go every hour, even if you don't feel like you need to. Try to wait the whole hour, even if you need to go sooner. Then, once you get " "used to going every hour, increase the amount of time you wait in between bathroom visits. Over time, you might be able to \"retrain\" your bladder to wait 3 or 4 hours between bathroom visits.   Pelvic floor muscle training - This involves learning exercises to strengthen and relax your pelvic muscles. These include the muscles that control the flow of urine and bowel movements. When done right, these exercises can help. But people often do them wrong. Ask your doctor or nurse how to do them right. Your doctor might suggest working with a physical therapist who has special training in these exercises.  Should I see my doctor or nurse? -- Yes. Your doctor or nurse can find out what might be causing your incontinence. They can also suggest ways to relieve the problem.  When you speak to your doctor or nurse, ask if any of the medicines you take could be causing your symptoms. Some medicines can cause incontinence or make it worse.  Some people choose to wear pads or special underwear. These can help if you accidentally leak urine once in a while. But they can also cause skin irritation if you use them a lot. If you have incontinence, ask your doctor or nurse how to treat it.  How is incontinence treated? -- The treatment options differ depending on what type of incontinence you have. Some of the options include:   Medicines to relax the bladder   Surgery to repair the tissues that support the bladder or to improve the flow of urine   Electrical stimulation of the nerves that relax the bladder  Urinary incontinence is more common in people who have been through menopause. (Menopause is when you stop having monthly periods). Some people have vaginal dryness after menopause. If this is the case for you, a treatment called vaginal estrogen might help.  What will my life be like? -- Many people with incontinence can regain bladder control or at least reduce the amount of leakage they have. The most important thing is to " tell your doctor or nurse. Then, work with them to find an approach that helps you.  All topics are updated as new evidence becomes available and our peer review process is complete.  This topic retrieved from [employer] on: Feb 26, 2024.  Topic 35939 Version 18.0  Release: 32.2.4 - C32.56  © 2024 UpToDate, Inc. and/or its affiliates. All rights reserved.  figure 1: Location of the bladder     This drawing shows the side view of a woman's body. The bladder is in front of the vagina. The urethra is the tube that carries urine from the bladder out of the body.  Graphic 298375 Version 1.0  Consumer Information Use and Disclaimer   Disclaimer: This generalized information is a limited summary of diagnosis, treatment, and/or medication information. It is not meant to be comprehensive and should be used as a tool to help the user understand and/or assess potential diagnostic and treatment options. It does NOT include all information about conditions, treatments, medications, side effects, or risks that may apply to a specific patient. It is not intended to be medical advice or a substitute for the medical advice, diagnosis, or treatment of a health care provider based on the health care provider's examination and assessment of a patient's specific and unique circumstances. Patients must speak with a health care provider for complete information about their health, medical questions, and treatment options, including any risks or benefits regarding use of medications. This information does not endorse any treatments or medications as safe, effective, or approved for treating a specific patient. UpToDate, Inc. and its affiliates disclaim any warranty or liability relating to this information or the use thereof.The use of this information is governed by the Terms of Use, available at https://www.woltersCertusuwer.com/en/know/clinical-effectiveness-terms. 2024© UpToDate, Inc. and its affiliates and/or licensors. All rights  reserved.  Copyright   © 2024 [employer] and/or its affiliates. All rights reserved.

## 2025-04-30 ENCOUNTER — HOSPITAL ENCOUNTER (OUTPATIENT)
Dept: NON INVASIVE DIAGNOSTICS | Facility: HOSPITAL | Age: 85
Discharge: HOME | End: 2025-04-30
Attending: INTERNAL MEDICINE
Payer: COMMERCIAL

## 2025-04-30 DIAGNOSIS — R00.2 PALPITATIONS: ICD-10-CM

## 2025-04-30 PROCEDURE — 93226 XTRNL ECG REC<48 HR SCAN A/R: CPT

## 2025-04-30 PROCEDURE — 93225 XTRNL ECG REC<48 HRS REC: CPT

## 2025-05-15 ENCOUNTER — HOSPITAL ENCOUNTER (OUTPATIENT)
Dept: RADIOLOGY | Facility: HOSPITAL | Age: 85
Discharge: HOME | End: 2025-05-15
Attending: NURSE PRACTITIONER
Payer: COMMERCIAL

## 2025-05-15 DIAGNOSIS — Z78.0 POSTMENOPAUSAL ESTROGEN DEFICIENCY: ICD-10-CM

## 2025-05-15 PROCEDURE — 77080 DXA BONE DENSITY AXIAL: CPT

## 2025-05-17 DIAGNOSIS — E11.42 TYPE 2 DIABETES MELLITUS WITH DIABETIC POLYNEUROPATHY, WITHOUT LONG-TERM CURRENT USE OF INSULIN (HCC): ICD-10-CM

## 2025-05-17 NOTE — TELEPHONE ENCOUNTER
Medication Refill Request       Medication: traMADol (Ultram) 50 mg tablet    Dose/Frequency: 2.5 mg Weekly     Quantity: 2 mL     Pharmacy: [facility] pharmacy     Office:   [] PCP/Provider -   [x] Specialty/Provider -     Does the patient have enough for 3 days?   [] Yes   [x] No - Send as HP to POD    Is the patient completely out of the medication or does not have enough until the next business day?  [x] Yes - send to [facility]  [] No - Send as HP to POD

## 2025-05-19 DIAGNOSIS — E11.42 TYPE 2 DIABETES MELLITUS WITH DIABETIC POLYNEUROPATHY, WITHOUT LONG-TERM CURRENT USE OF INSULIN (HCC): ICD-10-CM

## 2025-05-19 RX ORDER — TIRZEPATIDE 2.5 MG/.5ML
2.5 INJECTION, SOLUTION SUBCUTANEOUS WEEKLY
Qty: 2 ML | Refills: 1 | OUTPATIENT
Start: 2025-05-19

## 2025-05-20 ENCOUNTER — RESULTS FOLLOW-UP (OUTPATIENT)
Dept: CARDIOLOGY CLINIC | Facility: CLINIC | Age: 85
End: 2025-05-20

## 2025-05-20 RX ORDER — TIRZEPATIDE 2.5 MG/.5ML
2.5 INJECTION, SOLUTION SUBCUTANEOUS WEEKLY
Qty: 2 ML | Refills: 2 | Status: SHIPPED | OUTPATIENT
Start: 2025-05-20

## 2025-06-10 ENCOUNTER — TELEPHONE (OUTPATIENT)
Dept: ENDOCRINOLOGY | Facility: CLINIC | Age: 85
End: 2025-06-10

## 2025-06-10 NOTE — TELEPHONE ENCOUNTER
With her age, I would not her to be severely calorie deficient, so I would aim for around 9560-6680 sofi.  As far as the Mounjaro she should be fine moving it a day early.  That being said I just be mindful that there might be some side effects for a couple days.

## 2025-06-10 NOTE — TELEPHONE ENCOUNTER
Patient states that the provider told her to count her calories. Patient is asking how many calories that she should be eating per day.     Patient is also taking the mounjaro injection on Tuesday. Patient would like to know if she could switch the day to Monday without any issues.

## 2025-06-25 ENCOUNTER — TELEPHONE (OUTPATIENT)
Age: 85
End: 2025-06-25

## 2025-06-25 NOTE — TELEPHONE ENCOUNTER
Patient calling in d/t she states OAB has been worsening ever since last appt. States she is up many times at night. Requesting appt to discuss procedures to help with this. States she tried the exercises and they did not help and she stopped Oxybutynin after a month d/t that was not working. Scheduled follow up for 7/16.

## 2025-07-08 DIAGNOSIS — E11.42 TYPE 2 DIABETES MELLITUS WITH DIABETIC POLYNEUROPATHY, WITHOUT LONG-TERM CURRENT USE OF INSULIN (HCC): ICD-10-CM

## 2025-07-08 RX ORDER — TIRZEPATIDE 2.5 MG/.5ML
2.5 INJECTION, SOLUTION SUBCUTANEOUS WEEKLY
Qty: 2 ML | Refills: 2 | Status: SHIPPED | OUTPATIENT
Start: 2025-07-08

## 2025-07-09 ENCOUNTER — TELEPHONE (OUTPATIENT)
Age: 85
End: 2025-07-09

## 2025-07-09 NOTE — TELEPHONE ENCOUNTER
Patient called back and was satisfied with the dates for her Dexa and her physical. She stated that was all she needed at this time.

## 2025-07-09 NOTE — TELEPHONE ENCOUNTER
Patient called back to get the date of her last Dexa and last physical which I gave her. She is going to find out what other information she might need and call back. .

## 2025-07-09 NOTE — TELEPHONE ENCOUNTER
Patient called stating that her insurance company can give her 'points' for doing preventive wellness exams. She is asking if pcp can sent that information over to her insurance company. Patient will call ins to find out how they want pcp to send the information over and what to send over. Pt will call back with info.

## 2025-07-16 ENCOUNTER — OFFICE VISIT (OUTPATIENT)
Dept: UROLOGY | Facility: CLINIC | Age: 85
End: 2025-07-16
Payer: COMMERCIAL

## 2025-07-16 VITALS
WEIGHT: 192 LBS | SYSTOLIC BLOOD PRESSURE: 134 MMHG | HEART RATE: 75 BPM | DIASTOLIC BLOOD PRESSURE: 88 MMHG | HEIGHT: 62 IN | BODY MASS INDEX: 35.33 KG/M2 | OXYGEN SATURATION: 97 %

## 2025-07-16 DIAGNOSIS — N90.89 LABIAL LESION: ICD-10-CM

## 2025-07-16 DIAGNOSIS — N32.81 OAB (OVERACTIVE BLADDER): Primary | ICD-10-CM

## 2025-07-16 LAB
POST-VOID RESIDUAL VOLUME, ML POC: 30 ML
SL AMB  POCT GLUCOSE, UA: NORMAL
SL AMB LEUKOCYTE ESTERASE,UA: NORMAL
SL AMB POCT BILIRUBIN,UA: NORMAL
SL AMB POCT BLOOD,UA: NORMAL
SL AMB POCT CLARITY,UA: CLEAR
SL AMB POCT COLOR,UA: YELLOW
SL AMB POCT KETONES,UA: NORMAL
SL AMB POCT NITRITE,UA: NORMAL
SL AMB POCT PH,UA: 7
SL AMB POCT SPECIFIC GRAVITY,UA: 1.02
SL AMB POCT URINE PROTEIN: NORMAL
SL AMB POCT UROBILINOGEN: 0.2

## 2025-07-16 PROCEDURE — 81002 URINALYSIS NONAUTO W/O SCOPE: CPT | Performed by: UROLOGY

## 2025-07-16 PROCEDURE — 99214 OFFICE O/P EST MOD 30 MIN: CPT | Performed by: UROLOGY

## 2025-07-16 PROCEDURE — 51798 US URINE CAPACITY MEASURE: CPT | Performed by: UROLOGY

## 2025-07-16 NOTE — PROGRESS NOTES
Keo Summers is a(n) 85 y.o. female. , :  1940    Subjective     Assessment:  The primary encounter diagnosis was OAB (overactive bladder). A diagnosis of Labial lesion was also pertinent to this visit.  85-year-old woman with overactive bladder.  Instability.  Was started on oxybutynin XL 5 mg daily but noted no benefit.  Did self-guided pelvic floor exercises with no benefit.  Was not formally treated by physical therapy.  Was supposed to return in 2025 to discuss results. Had diverticular abscess and diversion. Tried oxybutynin and no help. Tried bladder exercises no help./  Leaks small amounts on way to restroom.  Can't squeeze hard enough to stop. Could get also treatment the cyst of her right labia at same time as 100u botox intravesically.      Radiology   CT ap w CTS 21    KIDNEYS/URETERS:  Several small cysts are unchanged bilaterally. No hydronephrosis.   URINARY BLADDER: Unremarkable.       History  OAB wet      Past  surgical history   24          Cystoscopy     No cystocele, mild urethral prolapse, some leak with urethral hypermobility  10/03/24 UDSNo TC at 100mL, Detrusor instability     Prior Visits  24  Dr. Rob   83-year-old woman presents with vague complaints of mixed urinary incontinence.  Denies urinary infections.  Denies hematuria.  Gets up frequently at night with the urge to go and leaks before she gets to the bathroom.  Claims that she saw somebody in [place] for this as well as somebody in [place].  I have none of those records.  Claims that she took a pill for a month.  Cannot recall the name.  Does not think it really helped her.  Denied prior surgery for prolapse or incontinence.  Still has uterus present.  Explained to her the process that we go through to try to evaluate her incontinence.  She will need a bladder scan.  She should have cystoscopy and pelvic examination with a full bladder.  We will attempt to elicit stress  incontinence and see if she has prolapse.  She may ultimately need urodynamics before we decide how to best treat her.  Anticholinergics are probably not ideal in the elderly.  We have to make sure that she is not constipated coming into the treatment if she has a history of diverticular disease with diversion and reconnection of her gut.  Could have had denervation of the bladder from that pair of operations.  Urine today showed just trace leukocytes.  Not getting treated with antibiotics.  Hemoglobin A1c 5.3 April 2024.  PVR 45 mL.     Plan: She should undergo cystoscopy with pelvic examination and a uroflow.  Advised her it might be 6 weeks or so unless there is a cancellation.  She is aware.  Also claims that there are lesions on her labia which she wants evaluated at the time of pelvic examination.  Had 1 previously burned off.        10/17/2024 OV Leroy Werner  With OAB and UDS 10/03/24 that indicated detrusor instability.  Unable to adequately assess for TC due to DI with leakage. Continues to urinary frequency/urgency for small amounts.  Notes often wakes up at night and then when awake will void.  If tries to hold her urine too long will have occ leakage. Has leakage with coughing and sneezing and uses a pad.  No need for a pull up at current time. Pt notes not taking potassium on her med list.  Has hx of glaucoma and will check with eye MD prior to starting meds.     7/16/2025 OV Brant Rob  85-year-old woman with overactive bladder.  Instability.  Was started on oxybutynin XL 5 mg daily but noted no benefit.  Did self-guided pelvic floor exercises with no benefit.  Was not formally treated by physical therapy.  Was supposed to return in February 2025 to discuss results. Had diverticular abscess and diversion. Tried oxybutynin and no help. Tried bladder exercises no help./  Leaks small amounts on way to restroom.  Can't squeeze hard enough to stop. Could get also treatment the cyst of her right  "labia at same time as 100u botox intravesically.     Review of Systems    No results found for: \"PSA\"  No results found for: \"TESTOSTERONE\"  No components found for: \"CR\"  No results found for: \"HBA1C\"    Objective     /88 (BP Location: Right arm, Patient Position: Sitting, Cuff Size: Large)   Pulse 75   Ht 5' 2.25\" (1.581 m)   Wt 87.1 kg (192 lb)   SpO2 97%   BMI 34.84 kg/m²     Physical Exam      Brant Liane, [facility]  "

## 2025-07-16 NOTE — PATIENT INSTRUCTIONS
Just to clarify what we are doing, you have leakage from the bladder that we think is secondary to overactivity which I do not think a sling is going to fix.  If anything it makes it worse.  I would not do a SPARC sling on you.  You probably need Botox injection to the bladder which would take about 10 minutes in the OR.  In addition the labial lesion that you have might be able to be excised if it is not too extensive.  It is obviously not my specialty to be operating on lady parts but I think skin is skin so long as it is not too extensive and does not require a flap we can probably do that at the same time.

## 2025-07-17 ENCOUNTER — PREP FOR PROCEDURE (OUTPATIENT)
Dept: UROLOGY | Facility: CLINIC | Age: 85
End: 2025-07-17

## 2025-07-17 ENCOUNTER — TELEPHONE (OUTPATIENT)
Dept: UROLOGY | Facility: CLINIC | Age: 85
End: 2025-07-17

## 2025-07-17 DIAGNOSIS — R39.89 SUSPECTED UTI: Primary | ICD-10-CM

## 2025-07-17 DIAGNOSIS — N39.3 STRESS INCONTINENCE OF URINE: ICD-10-CM

## 2025-07-17 NOTE — TELEPHONE ENCOUNTER
Pt returning call to . Pt is requesting a call back from  to discuss further.     Pt - [phone]

## 2025-07-17 NOTE — TELEPHONE ENCOUNTER
Patient is calling to speak with [facility]. She is returning call. Informed patient of message from  . She is holding 08/20/25 at [facility].  She stated if that is the soonest she will take it. Call her back with the details.      Patient can be reached at [phone]

## 2025-07-25 NOTE — TELEPHONE ENCOUNTER
Pt called asking for the Surgery Coordinator to please mail her surgical packet and any information pertaining to procedure she is having she's very forgetful and like to have papers to read,she is to be contacted also

## 2025-07-28 NOTE — TELEPHONE ENCOUNTER
Patient confirmed surgical date however I have no spoke with her regarding anything yet.  No surgical packet will be mailed out until I speak with patient

## 2025-07-29 ENCOUNTER — PREP FOR PROCEDURE (OUTPATIENT)
Dept: UROLOGY | Facility: CLINIC | Age: 85
End: 2025-07-29

## 2025-07-29 DIAGNOSIS — R39.89 SUSPECTED UTI: Primary | ICD-10-CM

## 2025-07-29 DIAGNOSIS — N32.81 OAB (OVERACTIVE BLADDER): ICD-10-CM

## 2025-07-29 NOTE — TELEPHONE ENCOUNTER
Spoke with patient and confirmed surgery date of:08/20/25  Type of surgery:Cysto Botox   Operating physician: Dr. Rob  Location of surgery: [facility]     Verbally went over prep with patient on:   NPO  Bowel prep? No  Hospital calls afternoon prior with arrival time -Calls Friday afternoon for Monday surgeries  Patient needs ride to and from surgery (outpatient/inpatient)   Pre-op testing to be done 2 weeks prior to surgery  U/c  Blood thinners:   Pat will call a week prior   Clearances needed: none    Mailed/emailed to patient on:  Copy of packet scanned into Media on:  Labs in packet  Soap / Bowel prep in packet  Pre-op & Post-op in packet  Dates of H&P and post-op if needed    Consent: in Media

## 2025-07-31 ENCOUNTER — TELEPHONE (OUTPATIENT)
Dept: ENDOCRINOLOGY | Facility: CLINIC | Age: 85
End: 2025-07-31

## 2025-08-06 DIAGNOSIS — E78.5 DYSLIPIDEMIA: ICD-10-CM

## 2025-08-08 RX ORDER — ATORVASTATIN CALCIUM 40 MG/1
40 TABLET, FILM COATED ORAL DAILY
Qty: 90 TABLET | Refills: 1 | Status: SHIPPED | OUTPATIENT
Start: 2025-08-08

## 2025-08-20 ENCOUNTER — TELEPHONE (OUTPATIENT)
Age: 85
End: 2025-08-20

## 2025-08-21 ENCOUNTER — OFFICE VISIT (OUTPATIENT)
Dept: OBGYN CLINIC | Facility: CLINIC | Age: 85
End: 2025-08-21
Payer: COMMERCIAL

## 2025-08-21 VITALS — WEIGHT: 191 LBS | BODY MASS INDEX: 35.15 KG/M2 | HEIGHT: 62 IN

## 2025-08-21 DIAGNOSIS — I10 BENIGN ESSENTIAL HYPERTENSION: ICD-10-CM

## 2025-08-21 DIAGNOSIS — E11.42 TYPE 2 DIABETES MELLITUS WITH DIABETIC POLYNEUROPATHY, WITHOUT LONG-TERM CURRENT USE OF INSULIN (HCC): ICD-10-CM

## 2025-08-21 DIAGNOSIS — M25.561 CHRONIC PAIN OF RIGHT KNEE: ICD-10-CM

## 2025-08-21 DIAGNOSIS — Z01.818 PRE-OPERATIVE CLEARANCE: ICD-10-CM

## 2025-08-21 DIAGNOSIS — K21.9 GASTROESOPHAGEAL REFLUX DISEASE, UNSPECIFIED WHETHER ESOPHAGITIS PRESENT: ICD-10-CM

## 2025-08-21 DIAGNOSIS — M17.11 PRIMARY OSTEOARTHRITIS OF RIGHT KNEE: Primary | ICD-10-CM

## 2025-08-21 DIAGNOSIS — G89.29 CHRONIC PAIN OF RIGHT KNEE: ICD-10-CM

## 2025-08-21 PROCEDURE — 99215 OFFICE O/P EST HI 40 MIN: CPT | Performed by: ORTHOPAEDIC SURGERY

## 2025-08-21 RX ORDER — ASCORBIC ACID 500 MG
500 TABLET ORAL 2 TIMES DAILY
Qty: 60 TABLET | Refills: 0 | Status: SHIPPED | OUTPATIENT
Start: 2025-08-21 | End: 2025-09-20

## 2025-08-21 RX ORDER — FOLIC ACID 1 MG/1
1 TABLET ORAL DAILY
Qty: 30 TABLET | Refills: 0 | Status: SHIPPED | OUTPATIENT
Start: 2025-08-21 | End: 2025-09-20

## 2025-08-21 RX ORDER — ZINC SULFATE 50(220)MG
220 CAPSULE ORAL DAILY
Qty: 30 CAPSULE | Refills: 0 | Status: SHIPPED | OUTPATIENT
Start: 2025-08-21 | End: 2025-09-20

## 2025-08-21 RX ORDER — FERROUS SULFATE 324(65)MG
324 TABLET, DELAYED RELEASE (ENTERIC COATED) ORAL
Qty: 30 TABLET | Refills: 0 | Status: SHIPPED | OUTPATIENT
Start: 2025-08-21 | End: 2025-09-20

## 2025-08-21 RX ORDER — MUPIROCIN 2 %
OINTMENT (GRAM) TOPICAL 2 TIMES DAILY
Qty: 15 G | Refills: 0 | Status: SHIPPED | OUTPATIENT
Start: 2025-08-21

## (undated) DEVICE — TRAY EPIDURAL PERIFIX 20GA X 3.5IN TUOHY 8ML

## (undated) DEVICE — GLOVE SRG BIOGEL 7.5

## (undated) DEVICE — TOWEL SET X-RAY

## (undated) DEVICE — AIR INJECT CANNULA 27GA: Brand: OPHTHALMIC CANNULA

## (undated) DEVICE — B-H IRRIGATING CAN 19GA FLAT ANGLED 8MM: Brand: OPHTHALMIC CANNULA

## (undated) DEVICE — BRACHIAL PLEXUS SET

## (undated) DEVICE — CHLORAPREP APPLICATOR TINTED 10.5ML ONE-STEP

## (undated) DEVICE — NEEDLE BLUNT 18 G X 1 1/2 W FILTER

## (undated) DEVICE — SYRINGE 5ML LL

## (undated) DEVICE — EYE PACK CUSTOM -FINNEGAN

## (undated) DEVICE — SMALL NEEDLE COUNTER NEST

## (undated) DEVICE — THE MONARCH® "D" CARTRIDGE IS A SINGLE-USE POLYPROPYLENE CARTRIDGE FOR POSTERIOR CHAMBER IOL DELIVERY: Brand: MONARCH® III

## (undated) DEVICE — ACTIVE FMS W/ INTREPID* ULTRA SLEEVES, 0.9MM 45° ABS* INTREPID* BALANCED TIP: Brand: ALCON

## (undated) DEVICE — CLEARCUT® SLIT KNIFE INTREPID MICRO-COAXIAL SYSTEM 2.4 SB: Brand: CLEARCUT®; INTREPID

## (undated) DEVICE — PLASTIC ADHESIVE BANDAGE: Brand: CURITY

## (undated) DEVICE — INTREPID® TRANSFORMER IA HP: Brand: INTREPID®

## (undated) DEVICE — RADIOLOGY STERILE LABELS: Brand: CENTURION

## (undated) DEVICE — EYE PADS 1 5/8"X2 5/8": Brand: MCKESSON

## (undated) DEVICE — NEEDLE TUOHY 20G X 3.5 IN WINGED

## (undated) DEVICE — MICROSURGICAL INSTRUMENT IRR. CYSTITOME 25GA STRAIGHT-REVERSE CUTTING: Brand: ALCON